# Patient Record
Sex: FEMALE | Race: WHITE | Employment: OTHER | URBAN - METROPOLITAN AREA
[De-identification: names, ages, dates, MRNs, and addresses within clinical notes are randomized per-mention and may not be internally consistent; named-entity substitution may affect disease eponyms.]

---

## 2017-10-17 ENCOUNTER — CARE COORDINATION (OUTPATIENT)
Dept: CARE COORDINATION | Facility: CLINIC | Age: 67
End: 2017-10-17

## 2017-10-17 DIAGNOSIS — G35 MS (MULTIPLE SCLEROSIS) (H): Primary | ICD-10-CM

## 2017-10-17 NOTE — PROGRESS NOTES
"linic Care Coordination Contact  OUTREACH    Referral Information:  Referral Source: PCP  Reason for Contact: Initial. Transfer concerns.  Care Conference: No     Universal Utilization:   ED Visits in last year: 1  Hospital visits in last year: 1  Last PCP appointment: 10/17/17  Multiple Providers or Specialists: Cardiology    Clinical Concerns:  Current Medical Concerns:   Patient Active Problem List   Diagnosis     Multiple sclerosis (H)     Anterior shoulder dislocation, right, initial encounter       Current Behavioral Concerns: None    Education Provided to patient: To call clinic with all concerns.   Informed pt to contact CC if [facility] does not contact them in the next 2 days.  Pt states understanding.   Clinical Pathway: None    Medication Management:  Pt states her and  set up medications together weekly in mediplanner.  Pt has difficulty with finger dexterity d/t contractures she states.  Med reconciliation completed today.  Pt states she has a narcotic medication she uses \"rarely\" but does not \"remember what it is called\"  Has no issues obtaining medications as usually gets them through the mail.    Functional Status:  Mobility Status: Dependent/Assisted by Another  Equipment Currently Used at Home: grab bar, walker, standard  Transportation: Uses [employer] mobility.     Psychosocial:  Current living arrangement:: I live in a private home with spouse  Financial/Insurance: [employer].  Denies financial concerns.    Resources and Interventions:  Current Resources:  [employer]  Advanced Care Plans/Directives on file:: In process (per PCP note information has been sent home.)  Referrals Placed: Home Care  Patient/Caregiver understanding:  Pt states she is having trouble getting to the toilet independently d/t mobility issues caused by her MS.  She currently has a commode but states  \"it is rickety\" and she is unable to use it.  She states her  assists her to the bathroom but gets " "\"irritated when he needs to help\"  Pt would like to gain some independence again and possibly \"learn some tricks so she can transfer herself to the toilet independantly\"  She does have grab bars in the bathroom with a pivot disc\"  She has pain to bilateral lower extremities and is taking scheduled Tramadol and just began taking Lyrica for this in recent days.  She states this is \"pretty effective for pain\" States she \"can feel the difference since beginning Lyrica already\"  Pt states she will begin on ABX for her UTI today. She complains of some burning with urination and believes UTI adds to pain of bilateral LE'S.  Pharmacy to deliver the medication this evening. She states she has occasional constipation.  Her appetite is fair.  No issues with sleep.       Upcoming appointment:  TBD     Plan: CC put in referral for [facility].  No further outreaches as pt is not part of shared savings.  Pt to contact CC if [facility] does not make contact with her in next 2 days.  Pt states understanding.     Valarie Reynolds RN  [facility]  Care Coordination  [phone]    "

## 2017-10-18 ENCOUNTER — DOCUMENTATION ONLY (OUTPATIENT)
Dept: CARE COORDINATION | Facility: CLINIC | Age: 67
End: 2017-10-18

## 2017-10-18 NOTE — PROGRESS NOTES
[facility] will be sharing updates with you on Referral requests for home care services.  This is for care coordination purposes and alert you to referral status.    Dear Dr. Vanessa Whitney, Attending),  Thank You for the referral for [facility], for Mariela Allen; MRN [id number].  At this time, [facility] is experiencing a high volume of referrals and is not able to meet your patient s needs in a safe and timely fashion for **Home Care Services**.  We have transferred this client to our partnering agency [facility] [phone],  Fax [phone].  Partner Agency will be calling the client to set up an assessment visit within our 48hr window.  They will follow up with the PCP later this week to establish POC orders.  Client is agreeable to this plan.  We have forwarded the MD orders, H&P, DC Summary, procedure reports, EPIC Face Sheet and financial information to our partner agency.  We have confirmed receipt of the fax and their acceptance of the referral.  Thank you again for the referral,  Lindsay Ellsworth,   [phone]

## 2017-12-21 ENCOUNTER — TRANSFERRED RECORDS (OUTPATIENT)
Dept: HEALTH INFORMATION MANAGEMENT | Facility: CLINIC | Age: 67
End: 2017-12-21

## 2018-07-02 ENCOUNTER — HOSPITAL ENCOUNTER (OUTPATIENT)
Dept: MAMMOGRAPHY | Facility: CLINIC | Age: 68
Discharge: HOME | End: 2018-07-02
Attending: FAMILY MEDICINE | Admitting: FAMILY MEDICINE
Payer: MEDICARE

## 2018-07-02 ENCOUNTER — HOSPITAL ENCOUNTER (OUTPATIENT)
Dept: MAMMOGRAPHY | Facility: CLINIC | Age: 68
End: 2018-07-02
Attending: FAMILY MEDICINE
Payer: MEDICARE

## 2018-07-02 DIAGNOSIS — R92.8 OTHER ABNORMAL AND INCONCLUSIVE FINDINGS ON DIAGNOSTIC IMAGING OF BREAST: ICD-10-CM

## 2018-07-02 DIAGNOSIS — N63.0 BREAST NODULE: ICD-10-CM

## 2018-07-02 PROCEDURE — 76642 ULTRASOUND BREAST LIMITED: CPT | Mod: 50

## 2018-07-02 PROCEDURE — 77066 DX MAMMO INCL CAD BI: CPT

## 2018-07-09 ENCOUNTER — HOSPITAL ENCOUNTER (OUTPATIENT)
Dept: MAMMOGRAPHY | Facility: CLINIC | Age: 68
Discharge: HOME | End: 2018-07-09
Attending: FAMILY MEDICINE | Admitting: FAMILY MEDICINE
Payer: MEDICARE

## 2018-07-09 ENCOUNTER — HOSPITAL ENCOUNTER (OUTPATIENT)
Dept: MAMMOGRAPHY | Facility: CLINIC | Age: 68
End: 2018-07-09
Attending: FAMILY MEDICINE
Payer: MEDICARE

## 2018-07-09 DIAGNOSIS — N63.0 BREAST NODULE: ICD-10-CM

## 2018-07-09 DIAGNOSIS — R92.8 OTHER ABNORMAL AND INCONCLUSIVE FINDINGS ON DIAGNOSTIC IMAGING OF BREAST: ICD-10-CM

## 2018-07-09 PROCEDURE — 88305 TISSUE EXAM BY PATHOLOGIST: CPT | Performed by: RADIOLOGY

## 2018-07-09 PROCEDURE — 00000158 ZZHCL STATISTIC H-FISH PROCESS B/S: Performed by: RADIOLOGY

## 2018-07-09 PROCEDURE — 88377 M/PHMTRC ALYS ISHQUANT/SEMIQ: CPT | Performed by: PATHOLOGY

## 2018-07-09 PROCEDURE — 88360 TUMOR IMMUNOHISTOCHEM/MANUAL: CPT | Performed by: RADIOLOGY

## 2018-07-09 PROCEDURE — 00000159 ZZHCL STATISTIC H-SEND OUTS PREP: Performed by: RADIOLOGY

## 2018-07-09 PROCEDURE — 19083 BX BREAST 1ST LESION US IMAG: CPT | Mod: RT

## 2018-07-09 PROCEDURE — 25000125 ZZHC RX 250: Performed by: FAMILY MEDICINE

## 2018-07-09 PROCEDURE — 88360 TUMOR IMMUNOHISTOCHEM/MANUAL: CPT | Mod: 26,59 | Performed by: RADIOLOGY

## 2018-07-09 PROCEDURE — 27210206 US BREAST BIOPSY CORE NEEDLE LEFT

## 2018-07-09 PROCEDURE — 88360 TUMOR IMMUNOHISTOCHEM/MANUAL: CPT | Mod: 26 | Performed by: RADIOLOGY

## 2018-07-09 PROCEDURE — 88305 TISSUE EXAM BY PATHOLOGIST: CPT | Mod: 26 | Performed by: RADIOLOGY

## 2018-07-09 RX ADMIN — LIDOCAINE HYDROCHLORIDE 10 ML: 10 INJECTION, SOLUTION INFILTRATION; PERINEURAL at 13:43

## 2018-07-09 NOTE — DISCHARGE INSTRUCTIONS

## 2018-07-11 ENCOUNTER — TELEPHONE (OUTPATIENT)
Dept: SURGERY | Facility: CLINIC | Age: 68
End: 2018-07-11

## 2018-07-11 LAB — COPATH REPORT: NORMAL

## 2018-07-11 NOTE — TELEPHONE ENCOUNTER
Call received from LATOYA Weldon. Referring provider would like breast center to call patient with pathology results from breast biopsy and coordinate care.     Ashley Infante RN, BSN, OCN  Nurse Navigator   [facility]  [phone]

## 2018-07-11 NOTE — TELEPHONE ENCOUNTER
Left message for patient to call back. No phi left on voicemail.    Ashley Infante RN, BSN, OCN  Nurse Navigator   [facility]  [phone]

## 2018-07-11 NOTE — TELEPHONE ENCOUNTER
Call placed to referring provider's office, [facility]. Pathology results of breast biopsy performed on 7/9/2018 given to LATOYA Weldon. She will review results with provider and call us back. Both parties in agreement of plan.    Ashley Infante RN, BSN, OCN  Nurse Navigator   [facility]  [phone]

## 2018-07-11 NOTE — TELEPHONE ENCOUNTER
Call returned by patient.  verified.    Patient notified pathology results from breast biopsies performed on 2018 revealed:    Left Breast, 0.8 cm lesion, 1:00 position, 5 cm from the nipple: invasive ductal carcinoma, grade 1.  Right Breast, 0.8cm lesion, 12:00 position 2 cm from the nipple: benign fibrotic breast tissue.    Per radiologist, Dr. Chris Cruz, results are concordant with imaging findings.    Recommendation: surgical consultation.     Patient is scheduled to see Dr. Mariel Boucher on 2018 at 1:45 check in at 1:30 at [facility].      All patient's questions answered appropriately and thoroughly. Patient will call our office in the interim with additional questions/concerns.     Both parties in agreement of above plan.    Ashley Infante RN, BSN, OCN  Nurse Navigator   [facility]  [phone]

## 2018-07-12 LAB — COPATH REPORT: NORMAL

## 2018-07-17 ENCOUNTER — TELEPHONE (OUTPATIENT)
Dept: SURGERY | Facility: CLINIC | Age: 68
End: 2018-07-17

## 2018-07-17 ENCOUNTER — OFFICE VISIT (OUTPATIENT)
Dept: SURGERY | Facility: CLINIC | Age: 68
End: 2018-07-17
Payer: COMMERCIAL

## 2018-07-17 VITALS
HEART RATE: 89 BPM | HEIGHT: 62 IN | BODY MASS INDEX: 20.61 KG/M2 | WEIGHT: 112 LBS | SYSTOLIC BLOOD PRESSURE: 137 MMHG | DIASTOLIC BLOOD PRESSURE: 85 MMHG

## 2018-07-17 DIAGNOSIS — C50.412 MALIGNANT NEOPLASM OF UPPER-OUTER QUADRANT OF LEFT BREAST IN FEMALE, ESTROGEN RECEPTOR POSITIVE (H): Primary | ICD-10-CM

## 2018-07-17 DIAGNOSIS — Z17.0 MALIGNANT NEOPLASM OF UPPER-OUTER QUADRANT OF LEFT BREAST IN FEMALE, ESTROGEN RECEPTOR POSITIVE (H): Primary | ICD-10-CM

## 2018-07-17 PROCEDURE — 99205 OFFICE O/P NEW HI 60 MIN: CPT | Performed by: SURGERY

## 2018-07-17 NOTE — NURSING NOTE
Breast Patients    BREAST PATIENTS (ALL)    1-Do you have any of the following symptoms? Other: non3  2-In which breast are you having the symptoms? left  3-Do you use hormones?  No  4-Have you had a Mammogram? Yes  Where:  breast center  Date: 2018  5-Have you ever had a breast cyst drained? No  6-Have you ever had a breast biopsy? Yes  Side: both  Date: 7/9/18  7-Have you ever had a Breast Cancer? No   8-Is there a history of Breast Cancer in your family? No  9-Have you ever had Ovarian Cancer? No  10-Is there a history of Ovarian Cancer in your family? Yes   Relationship to you:    Mother  11-Summarize your caffeine intake (i.e. coffee, tea, chocolate, soda etc.): 0    BREAST PATIENTS (FEMALE)    12-What age did your periods begin? 13  13-Date your last menstrual period began? unsure  14-Number of full-term pregnancies: 0  15-Your age when your first child was born? N/A  16-Did you nurse your children? N/A  17-Are you pregnant now? No  18-Have you begun menopause? Yes  Age Menopause began:  1998  19-Have you had either ovary removed?No  20-Do you have breast implants? No

## 2018-07-17 NOTE — PROGRESS NOTES
[facility] Breast Surgery Consultation    HPI:   Mariela Allen is a 68 year old female who is seen in consultation at the request of Dr. Jackson for evaluation of newly diagnosed left breast cancer. She underwent a diagnostic mammogram and US on 7/2/2018 as follow up after a CT from 3/13 revealed a nodule in the right breast. The right breast nodule was biopsied and benign. Her mammogram also revealed a irregular mass in the left upper outer breast which measured 8mm in size.     Mariela has a PMH s/f multiple sclerosis and mitral stenosis. She reports no prior breast concerns. No prior biopsies or surgeries. She had not noticed any breast changes. Her MS has been progressive. She is currently in a wheelchair. She does not drive. She lives with her  who also has some medical problems.     Hormonal history:  Post menopausal,  no HRT, no fertility treatment.  no children, menarche 13.    Family history of breast cancer: No  Family history of ovarian cancer:  Yes - mother, ? In her 50s.   Family history of colon cancer: No  Family history of prostate cancer: No    Imaging:     Recent Results (from the past 744 hour(s))   MA Diagnostic Bilateral w/Elpidio    Narrative    Examination: Bilateral digital diagnostic mammography and digital  breast tomosynthesis with computer aided detection, and focused  ultrasound of both breasts, 7/2/2018.    Comparison: Outside CT dated 3/13/2018. No prior comparison mammograms  available.    History: Outside CT demonstrating nodule in the right breast. Patient  with multiple sclerosis.    BREAST DENSITY: Scattered fibroglandular densities    Findings: Mammogram with digital breast tomosynthesis demonstrates a  subcentimeter oval circumscribed mass in the superior retroareolar  right breast.    Mammogram with digital breast tomosynthesis also demonstrates an  irregularly-shaped mass with architectural distortion in the upper  outer quadrant of the left breast.    Focussed  ultrasound of the upper inner/upper outer quadrants of the  right breast was performed. Hypoechoic oval mass with somewhat  indistinct margins is seen at the 12:00 position, 2 cm from the nipple  on the right. This measures 5 by by 8 mm.    Focussed ultrasound of the upper outer quadrant of the left breast was  performed. Irregularly-shaped hypoechoic mass with indistinct margins  is seen at the 1:00 position, 5 cm from the nipple on the left. This  measures 8 x 7 x 8 mm.      Impression    IMPRESSION: BI-RADS CATEGORY: 4 - Suspicious Abnormality-Biopsy Should  Be Considered.    RECOMMENDED FOLLOW-UP: Biopsy.    Ultrasound-guided core needle biopsy of both the right and left  breasts.    The patient was given the results of the examination.    LEIGH YEPEZ MD   US Breast Bilateral Limited 1-3 Quadrants    Narrative    Examination: Bilateral digital diagnostic mammography and digital  breast tomosynthesis with computer aided detection, and focused  ultrasound of both breasts, 7/2/2018.    Comparison: Outside CT dated 3/13/2018. No prior comparison mammograms  available.    History: Outside CT demonstrating nodule in the right breast. Patient  with multiple sclerosis.    BREAST DENSITY: Scattered fibroglandular densities    Findings: Mammogram with digital breast tomosynthesis demonstrates a  subcentimeter oval circumscribed mass in the superior retroareolar  right breast.    Mammogram with digital breast tomosynthesis also demonstrates an  irregularly-shaped mass with architectural distortion in the upper  outer quadrant of the left breast.    Focussed ultrasound of the upper inner/upper outer quadrants of the  right breast was performed. Hypoechoic oval mass with somewhat  indistinct margins is seen at the 12:00 position, 2 cm from the nipple  on the right. This measures 5 by by 8 mm.    Focussed ultrasound of the upper outer quadrant of the left breast was  performed. Irregularly-shaped hypoechoic mass with  indistinct margins  is seen at the 1:00 position, 5 cm from the nipple on the left. This  measures 8 x 7 x 8 mm.      Impression    IMPRESSION: BI-RADS CATEGORY: 4 - Suspicious Abnormality-Biopsy Should  Be Considered.    RECOMMENDED FOLLOW-UP: Biopsy.    Ultrasound-guided core needle biopsy of both the right and left  breasts.    The patient was given the results of the examination.    LEIGH YEPEZ MD   US Breast Biopsy Core Needle Right    Narrative    ULTRASOUND BREAST BIOPSY CORE NEEDLE RIGHT, ULTRASOUND BREAST BIOPSY  CORE NEEDLE LEFT July 9, 2018 1:50 PM    HISTORY: Bilateral breast masses.    FINDINGS: Procedure, risks, benefits and alternatives were discussed  with the patient and the patient gave written and verbal consent.  Aseptic technique was utilized. 1% lidocaine was utilized for local  anesthesia. Under ultrasound guidance, biopsy of the masses was  performed and marker placed as follows and images were archived:  Site A:  Size: 14 gauge core needle biopsy system  Number of cores: Three.  Position: 1 o'clock, 5 cm from the nipple.  Marker: HydroMark     Site B:  Size: 14 gauge core needle biopsy system.  Number of cores: Three.  Position: 12 o'clock, 2 cm from the nipple.  Marker: HydroMark     Pressure was held over this area for approximately 10 minutes. A  dressing was placed and care instructions were discussed with the  patient.    Post biopsy mammogram was deferred.      Impression    IMPRESSION: Uncomplicated ultrasound guided core needle biopsy of both  breasts.    TI WELLS MD   US Breast Biopsy Core Needle Left    Narrative    ULTRASOUND BREAST BIOPSY CORE NEEDLE RIGHT, ULTRASOUND BREAST BIOPSY  CORE NEEDLE LEFT July 9, 2018 1:50 PM    HISTORY: Bilateral breast masses.    FINDINGS: Procedure, risks, benefits and alternatives were discussed  with the patient and the patient gave written and verbal consent.  Aseptic technique was utilized. 1% lidocaine was utilized for  local  anesthesia. Under ultrasound guidance, biopsy of the masses was  performed and marker placed as follows and images were archived:  Site A:  Size: 14 gauge core needle biopsy system  Number of cores: Three.  Position: 1 o'clock, 5 cm from the nipple.  Marker: HydroMark     Site B:  Size: 14 gauge core needle biopsy system.  Number of cores: Three.  Position: 12 o'clock, 2 cm from the nipple.  Marker: HydroMark     Pressure was held over this area for approximately 10 minutes. A  dressing was placed and care instructions were discussed with the  patient.    Post biopsy mammogram was deferred.      Impression    IMPRESSION: Uncomplicated ultrasound guided core needle biopsy of both  breasts.    TI WELLS MD       Percutaneous core needle biopsy, left:     ORIGINAL REPORT:     SPECIMEN(S):   A: Left ultrasound guided breast needle biopsy, 1:00m, 5.0 cm from nipple,    0.8 cm size   B: Right ultrasound guided breast needle biopsy, 12:0, 2.0 cm from nipple,    0.8 cm size     FINAL DIAGNOSIS:   A. Left breast, 0.8 cm lesion, 1:00 position, 5 cm from the nipple,   ultrasound core biopsy   - Invasive carcinoma of no special type (ductal, not otherwise specified)   - [place] grade 1 of 3   - No lymphovascular space invasion seen   - No in situ carcinoma seen   - No microcalcifications seen   - See separate reports for the estrogen and progesterone receptor studies   and the HER2 study by FISH     ER/AR positive, Her 2 negative      B. Right breast, 0.8 cm lesion, 12:00 position, 2 cm from the nipple,   ultrasound core biopsy   - Benign fibrotic breast tissue (see comment)   - No evidence of malignancy           Past Medical History:   has a past medical history of Cancer (H) and Multiple sclerosis (H).      Current Outpatient Prescriptions:      baclofen (LIORESAL) 10 MG tablet, Take 3 tablets by mouth 4 times daily, Disp: , Rfl:      Calcium Citrate-Vitamin D (CITRACAL + D PO), , Disp: , Rfl:       Cholecalciferol (VITAMIN D PO), , Disp: , Rfl:      Cyanocobalamin (VITAMIN B12 PO), , Disp: , Rfl:      diazepam (VALIUM) 5 MG tablet, Take 5 mg by mouth 3 times daily as needed, Disp: , Rfl:      HYDROcodone-acetaminophen (VICODIN) 5-500 MG per tablet, Take 1 tablet by mouth every 6 hours as needed For pain. Max acetaminophen dose: 4000 mg in 24hrs., Disp: , Rfl:      imipramine (TOFRANIL) 10 MG tablet, Take 20 mg by mouth At Bedtime, Disp: , Rfl:      pramipexole (MIRAPEX) 0.25 MG tablet, Take 0.25 mg by mouth At Bedtime Take 2-3 hours before bedtime, Disp: , Rfl:      traMADol (ULTRAM) 50 MG tablet, Take 50 mg by mouth 2 times daily, Disp: , Rfl:     Past Surgical History:  Past Surgical History:   Procedure Laterality Date     ORTHOPEDIC SURGERY             Allergies   Allergen Reactions     Solu-Medrol [Methylprednisolone]      High Dose caused Delirium        Social History:  Social History     Social History     Marital status:      Spouse name: N/A     Number of children: N/A     Years of education: N/A     Occupational History     Not on file.     Social History Main Topics     Smoking status: Not on file     Smokeless tobacco: Not on file     Alcohol use Yes      Comment: drinks wine almost daily      Drug use: Not on file     Sexual activity: Not on file     Other Topics Concern     Not on file     Social History Narrative        ROS:  The 10 point review of systems is negative other than noted in the HPI and above.    PE:  Vitals: There were no vitals taken for this visit.  General appearance: well-nourished, sitting comfortably, no apparent distress  Psych: normal affect, pleasant  HEENT:  Head normocephalic and atraumatic, pupils equal and round, conjunctivae clear, mucous membranes moist, external ears and nose normal  Neck: Supple without thyromegaly or masses  Lungs: Respirations unlabored  Lymphatic: No cervical, or supraclavicular lymphadenopathy  Extremities: Without  edema  Musculoskeletal:  Normal station and gait  Neurologic: nonfocal, grossly intact times four extremities, alert and oriented times three  Psychiatric: Mood and affect are appropriate  Skin: Without lesions or rashes    Breast:  A bilateral breast exam was performed in the supine position.. Bilateral breasts were palpated in a circumferential clockwise fashion including the supraclavicular and axillary areas.   Ecchymosis present on bilateral breast at biopsy sites, palpable hematoma on left deep to biopsy site, no definite palpable mass. Nipple/areola are normal bilaterally. Skin otherwise normal. Parenchyma is moderately dense.     Lymph:       No supraclavicular/infraclavicular adenopathy.   Axillary adenopathy: none    Assessment:  Left breast invasive ductal carcinoma, grade 1, ER +, HI +, [id number] - measuring 0.8 cm at 1:00 and 4 cm from the nipple.    Plan:  Mariela is a very pleasant 68yof with history of MS who  has newly diagnosed left breast cancer.  I reviewed the imaging and pathology reports with her and explained the findings.  We talked about the fact that this is invasive ductal carcinoma  that is very small size.  We also talked about the fact that the tumor has favorable features (strongly ER/HI positive and no lymphvascular invasion) and should be quite treatable.    We next discussed the surgical options for treatment.  I described the procedures for lumpectomy and mastectomy including the details of the procedures, the risks, anesthesia and expected recovery.      I reviewed the data regarding lumpectomy and radiation vs mastectomy that shows that the local recurrence risk is slightly higher for lumpectomy and radiation vs mastectomy (5-10% vs. 1-2%), but the survival at 20 years is the same.  I advised  that lymph node biopsy is recommended whenever we are dealing with invasive breast cancer and described the procedure for sentinel lymph node biopsy.  We talked about the risk for  lymphedema which is small with removal of only a few nodes, but certainly not zero.  I also explained that since this is a very small tumor and was not palpable on clinical breast exam prior to the biopsy, I would ask radiology to place a radioactive seed pre-operatively for localization.    We talked about post-lumpectomy radiation, the course and usual side effects. We also talked about post-mastectomy reconstruction and the stages involved.    We have had a detailed discussion regarding the recommended treatment for axillary lymph node metastases for women undergoing lumpectomy followed by radiation and for women undergoing mastectomy.    We have discussed the indication, alternatives, risks and expected recovery.  Specifically, we have discussed local recurrence of cancer, risk of future second primary breast cancer, incisions, scarring, breast deformity, volume loss, possible need for axillary lymphadenectomy, postoperative infection, anesthesia, bleeding, blood transfusion, DVT, PE, postoperative restrictions and physical limitations.  We have discussed the recommended interventions and treatments for these outcomes.      All questions have been answered to the best of my ability.    At this time, she elects to proceed with seed localized left breast lumpectomy with SLNB and adjuvant radiation. We discussed that we would likely send an oncotype following surgery and that she will meet with Oncology a couple weeks after surgery as well.       Mariel Boucher MD      Please route or send letter to:  Primary Care Provider (PCP) and Referring Provider

## 2018-07-17 NOTE — LETTER
2018    Re: Mariela Allen - 1950    Mariela Allen is a 68 year old female who is seen in consultation at the request of Dr. Jackson for evaluation of newly diagnosed left breast cancer. She underwent a diagnostic mammogram and US on 2018 as follow up after a CT from 3/13 revealed a nodule in the right breast. The right breast nodule was biopsied and benign. Her mammogram also revealed a irregular mass in the left upper outer breast which measured 8mm in size.      Mariela has a PMH s/f multiple sclerosis and mitral stenosis. She reports no prior breast concerns. No prior biopsies or surgeries. She had not noticed any breast changes. Her MS has been progressive. She is currently in a wheelchair. She does not drive. She lives with her  who also has some medical problems.      Hormonal history:  Post menopausal,  no HRT, no fertility treatment.  no children, menarche 13.     Family history of breast cancer: No  Family history of ovarian cancer:  Yes - mother, ? In her 50s.   Family history of colon cancer: No  Family history of prostate cancer: No     Imaging:          Recent Results (from the past 744 hour(s))   MA Diagnostic Bilateral w/Elpidio     Narrative     Examination: Bilateral digital diagnostic mammography and digital  breast tomosynthesis with computer aided detection, and focused  ultrasound of both breasts, 2018.     Comparison: Outside CT dated 3/13/2018. No prior comparison mammograms  available.     History: Outside CT demonstrating nodule in the right breast. Patient  with multiple sclerosis.     BREAST DENSITY: Scattered fibroglandular densities     Findings: Mammogram with digital breast tomosynthesis demonstrates a  subcentimeter oval circumscribed mass in the superior retroareolar  right breast.     Mammogram with digital breast tomosynthesis also demonstrates an  irregularly-shaped mass with architectural distortion in the upper  outer quadrant of the left  breast.     Focussed ultrasound of the upper inner/upper outer quadrants of the  right breast was performed. Hypoechoic oval mass with somewhat  indistinct margins is seen at the 12:00 position, 2 cm from the nipple  on the right. This measures 5 by by 8 mm.     Focussed ultrasound of the upper outer quadrant of the left breast was  performed. Irregularly-shaped hypoechoic mass with indistinct margins  is seen at the 1:00 position, 5 cm from the nipple on the left. This  measures 8 x 7 x 8 mm.     Impression     IMPRESSION: BI-RADS CATEGORY: 4 - Suspicious Abnormality-Biopsy Should  Be Considered.     RECOMMENDED FOLLOW-UP: Biopsy.     Ultrasound-guided core needle biopsy of both the right and left  breasts.     The patient was given the results of the examination.     LEIGH YEPEZ MD   US Breast Bilateral Limited 1-3 Quadrants     Narrative     Examination: Bilateral digital diagnostic mammography and digital  breast tomosynthesis with computer aided detection, and focused  ultrasound of both breasts, 7/2/2018.     Comparison: Outside CT dated 3/13/2018. No prior comparison mammograms  available.     History: Outside CT demonstrating nodule in the right breast. Patient  with multiple sclerosis.     BREAST DENSITY: Scattered fibroglandular densities     Findings: Mammogram with digital breast tomosynthesis demonstrates a  subcentimeter oval circumscribed mass in the superior retroareolar  right breast.     Mammogram with digital breast tomosynthesis also demonstrates an  irregularly-shaped mass with architectural distortion in the upper  outer quadrant of the left breast.     Focussed ultrasound of the upper inner/upper outer quadrants of the  right breast was performed. Hypoechoic oval mass with somewhat  indistinct margins is seen at the 12:00 position, 2 cm from the nipple  on the right. This measures 5 by by 8 mm.     Focussed ultrasound of the upper outer quadrant of the left breast was  performed.  Irregularly-shaped hypoechoic mass with indistinct margins  is seen at the 1:00 position, 5 cm from the nipple on the left. This  measures 8 x 7 x 8 mm.     Impression     IMPRESSION: BI-RADS CATEGORY: 4 - Suspicious Abnormality-Biopsy Should  Be Considered.     RECOMMENDED FOLLOW-UP: Biopsy.     Ultrasound-guided core needle biopsy of both the right and left  breasts.     The patient was given the results of the examination.     LEIGH YEPEZ MD   US Breast Biopsy Core Needle Right     Narrative     ULTRASOUND BREAST BIOPSY CORE NEEDLE RIGHT, ULTRASOUND BREAST BIOPSY  CORE NEEDLE LEFT July 9, 2018 1:50 PM     HISTORY: Bilateral breast masses.     FINDINGS: Procedure, risks, benefits and alternatives were discussed  with the patient and the patient gave written and verbal consent.  Aseptic technique was utilized. 1% lidocaine was utilized for local  anesthesia. Under ultrasound guidance, biopsy of the masses was  performed and marker placed as follows and images were archived:  Site A:  Size: 14 gauge core needle biopsy system  Number of cores: Three.  Position: 1 o'clock, 5 cm from the nipple.  Marker: HydroMark      Site B:  Size: 14 gauge core needle biopsy system.  Number of cores: Three.  Position: 12 o'clock, 2 cm from the nipple.  Marker: HydroMark      Pressure was held over this area for approximately 10 minutes. A  dressing was placed and care instructions were discussed with the  patient.     Post biopsy mammogram was deferred.     Impression     IMPRESSION: Uncomplicated ultrasound guided core needle biopsy of both  breasts.     TI WELLS MD   US Breast Biopsy Core Needle Left     Narrative     ULTRASOUND BREAST BIOPSY CORE NEEDLE RIGHT, ULTRASOUND BREAST BIOPSY  CORE NEEDLE LEFT July 9, 2018 1:50 PM     HISTORY: Bilateral breast masses.     FINDINGS: Procedure, risks, benefits and alternatives were discussed  with the patient and the patient gave written and verbal consent.  Aseptic technique was  utilized. 1% lidocaine was utilized for local  anesthesia. Under ultrasound guidance, biopsy of the masses was  performed and marker placed as follows and images were archived:  Site A:  Size: 14 gauge core needle biopsy system  Number of cores: Three.  Position: 1 o'clock, 5 cm from the nipple.  Marker: HydroMark      Site B:  Size: 14 gauge core needle biopsy system.  Number of cores: Three.  Position: 12 o'clock, 2 cm from the nipple.  Marker: HydroMark      Pressure was held over this area for approximately 10 minutes. A  dressing was placed and care instructions were discussed with the  patient.     Post biopsy mammogram was deferred.     Impression     IMPRESSION: Uncomplicated ultrasound guided core needle biopsy of both  breasts.     TI WELLS MD         Percutaneous core needle biopsy, left:      ORIGINAL REPORT:     SPECIMEN(S):   A: Left ultrasound guided breast needle biopsy, 1:00m, 5.0 cm from nipple,    0.8 cm size   B: Right ultrasound guided breast needle biopsy, 12:0, 2.0 cm from nipple,    0.8 cm size     FINAL DIAGNOSIS:   A. Left breast, 0.8 cm lesion, 1:00 position, 5 cm from the nipple,   ultrasound core biopsy   - Invasive carcinoma of no special type (ductal, not otherwise specified)   - Randy grade 1 of 3   - No lymphovascular space invasion seen   - No in situ carcinoma seen   - No microcalcifications seen   - See separate reports for the estrogen and progesterone receptor studies   and the HER2 study by FISH    ER/ND positive, Her 2 negative       B. Right breast, 0.8 cm lesion, 12:00 position, 2 cm from the nipple,   ultrasound core biopsy   - Benign fibrotic breast tissue (see comment)   - No evidence of malignancy               Past Medical History:  has a past medical history of Cancer (H) and Multiple sclerosis (H).      Current Outpatient Prescriptions:      baclofen (LIORESAL) 10 MG tablet, Take 3 tablets by mouth 4 times daily, Disp: , Rfl:      Calcium Citrate-Vitamin D  (CITRACAL + D PO), , Disp: , Rfl:      Cholecalciferol (VITAMIN D PO), , Disp: , Rfl:      Cyanocobalamin (VITAMIN B12 PO), , Disp: , Rfl:      diazepam (VALIUM) 5 MG tablet, Take 5 mg by mouth 3 times daily as needed, Disp: , Rfl:      HYDROcodone-acetaminophen (VICODIN) 5-500 MG per tablet, Take 1 tablet by mouth every 6 hours as needed For pain. Max acetaminophen dose: 4000 mg in 24hrs., Disp: , Rfl:      imipramine (TOFRANIL) 10 MG tablet, Take 20 mg by mouth At Bedtime, Disp: , Rfl:      pramipexole (MIRAPEX) 0.25 MG tablet, Take 0.25 mg by mouth At Bedtime Take 2-3 hours before bedtime, Disp: , Rfl:      traMADol (ULTRAM) 50 MG tablet, Take 50 mg by mouth 2 times daily, Disp: , Rfl:             ROS:  The 10 point review of systems is negative other than noted in the HPI and above.     PE:  Vitals: There were no vitals taken for this visit.  General appearance: well-nourished, sitting comfortably, no apparent distress  Psych: normal affect, pleasant  HEENT:  Head normocephalic and atraumatic, pupils equal and round, conjunctivae clear, mucous membranes moist, external ears and nose normal  Neck: Supple without thyromegaly or masses  Lungs: Respirations unlabored  Lymphatic: No cervical, or supraclavicular lymphadenopathy  Extremities: Without edema  Musculoskeletal:  Normal station and gait  Neurologic: nonfocal, grossly intact times four extremities, alert and oriented times three  Psychiatric: Mood and affect are appropriate  Skin: Without lesions or rashes     Breast:  A bilateral breast exam was performed in the supine position.. Bilateral breasts were palpated in a circumferential clockwise fashion including the supraclavicular and axillary areas.   Ecchymosis present on bilateral breast at biopsy sites, palpable hematoma on left deep to biopsy site, no definite palpable mass. Nipple/areola are normal bilaterally. Skin otherwise normal. Parenchyma is moderately dense.      Lymph:         No  supraclavicular/infraclavicular adenopathy.                        Axillary adenopathy: none     Assessment:  Left breast invasive ductal carcinoma, grade 1, ER +, VA +, [id number] - measuring 0.8 cm at 1:00 and 4 cm from the nipple.     Plan:  Mariela is a very pleasant 68yof with history of MS who  has newly diagnosed left breast cancer.  I reviewed the imaging and pathology reports with her and explained the findings.  We talked about the fact that this is invasive ductal carcinoma  that is very small size.  We also talked about the fact that the tumor has favorable features (strongly ER/VA positive and no lymphvascular invasion) and should be quite treatable.     We next discussed the surgical options for treatment.  I described the procedures for lumpectomy and mastectomy including the details of the procedures, the risks, anesthesia and expected recovery.       I reviewed the data regarding lumpectomy and radiation vs mastectomy that shows that the local recurrence risk is slightly higher for lumpectomy and radiation vs mastectomy (5-10% vs. 1-2%), but the survival at 20 years is the same.  I advised  that lymph node biopsy is recommended whenever we are dealing with invasive breast cancer and described the procedure for sentinel lymph node biopsy.  We talked about the risk for lymphedema which is small with removal of only a few nodes, but certainly not zero.  I also explained that since this is a very small tumor and was not palpable on clinical breast exam prior to the biopsy, I would ask radiology to place a radioactive seed pre-operatively for localization.     We talked about post-lumpectomy radiation, the course and usual side effects. We also talked about post-mastectomy reconstruction and the stages involved.     We have had a detailed discussion regarding the recommended treatment for axillary lymph node metastases for women undergoing lumpectomy followed by radiation and for women undergoing  mastectomy.     We have discussed the indication, alternatives, risks and expected recovery.  Specifically, we have discussed local recurrence of cancer, risk of future second primary breast cancer, incisions, scarring, breast deformity, volume loss, possible need for axillary lymphadenectomy, postoperative infection, anesthesia, bleeding, blood transfusion, DVT, PE, postoperative restrictions and physical limitations. We have discussed the recommended interventions and treatments for these outcomes.       All questions have been answered to the best of my ability.     At this time, she elects to proceed with seed localized left breast lumpectomy with SLNB and adjuvant radiation. We discussed that we would likely send an oncotype following surgery and that she will meet with Oncology a couple weeks after surgery as well.         Mariel Boucher MD

## 2018-07-17 NOTE — MR AVS SNAPSHOT
After Visit Summary   7/17/2018    Mariela Allen    MRN: [id number]           Patient Information     Date Of Birth          1950        Visit Information        Provider Department      7/17/2018 1:45 PM Mariel Boucher MD [facility]      Today's Diagnoses     Malignant neoplasm of upper-outer quadrant of left breast in female, estrogen receptor positive (H)    -  1       Follow-ups after your visit        Your next 10 appointments already scheduled     Aug 15, 2018 10:15 AM CDT   US BREAST RADIOACTIVE SEED LOCALIZATION INITIAL LEFT with SHBCUS1,  BREAST NURSE, SH BREAST RAD   [facility] [place] ([facility])    [address] [place] [address], [address]  [facility] [id number] [phone]           Please bring a list of your medicines (including vitamins, minerals and over-the-counter drugs). Also, tell your doctor about any allergies you may have. Wear comfortable clothes and leave your valuables at home.  You do not need to do anything special to prepare for your exam.  Please call the Imaging Department at your exam site with any questions.            Aug 15, 2018 11:10 AM CDT   MA POST PROCEDURE LEFT with SHBCMA1   [facility] ([facility])    [address] [place] [address], [address]  [facility] [id number] [phone]           Do not use any powder, lotion or deodorant under your arms or on your breast. If you do, we will ask you to remove it before your exam.  Wear comfortable, two-piece clothing.  If you have any allergies, tell your care team.  Bring any previous mammograms from other facilities or have them mailed to the breast center.            Aug 15, 2018 11:30 AM CDT   NM SENTINEL NODE INJECTION BILATERAL with SHNM2   [facility] ([facility])    [address] [address]    [phone]           Please bring a list of your medicines to the exam. (Include vitamins, minerals and over-the-counter drugs.) You should wear comfortable clothes. Leave your valuables at home. Please bring related prior results and films.  Tell your doctor:   If you are breastfeeding or may be pregnant.   If you have had a barium test within the past few days. Barium may change the results of certain exams.   If you think you may need sedation (medicine to help you relax).  You may eat and drink as normal.  Please call your Imaging Department at your exam site with any questions.            Aug 15, 2018   Procedure with Mariel Boucher MD   [facility] (--)    [address]  [facility] [address] [phone]            Aug 15, 2018 12:10 PM CDT   MA BREAST SPECIMEN LEFT OR with SHMASP2   [facility] ([facility])    [address], [address]  [facility] [address] [phone]           Do not use any powder, lotion or deodorant under your arms or on your breast. If you do, we will ask you to remove it before your exam.  Wear comfortable, two-piece clothing.  If you have any allergies, tell your care team.  Bring any previous mammograms from other facilities or have them mailed to the breast center.            Aug 15, 2018 12:15 PM CDT   [facility] Same Day Surgery with Mariel Boucher MD   [facility]    [facility]   [phone]              Future tests that were ordered for you today     Open Future Orders        Priority Expected Expires Ordered    US Breast Radioactive Seed Placement, 1St Lesion Left Routine 7/17/2018 7/17/2019 7/17/2018    MA Breast Specimen Left OR Routine 7/17/2018 7/17/2019 7/17/2018    MA Post Procedure Left Routine 7/17/2018 7/17/2019 7/17/2018    NM Lymphoscintigraphy  "Injection only Routine 7/17/2018 7/17/2019 7/17/2018            Who to contact     If you have questions or need follow up information about today's clinic visit or your schedule please contact [facility] directly at [phone].  Normal or non-critical lab and imaging results will be communicated to you by MyChart, letter or phone within 4 business days after the clinic has received the results. If you do not hear from us within 7 days, please contact the clinic through MyChart or phone. If you have a critical or abnormal lab result, we will notify you by phone as soon as possible.  Submit refill requests through [employer] or call your pharmacy and they will forward the refill request to us. Please allow 3 business days for your refill to be completed.          Additional Information About Your Visit        Care EveryWhere ID     This is your Care EveryWhere ID. This could be used by other organizations to access your [facility] medical records  [id number]        Your Vitals Were     Pulse Height BMI (Body Mass Index)             89 5' 2\" (1.575 m) 20.49 kg/m2          Blood Pressure from Last 3 Encounters:   07/17/18 137/85   12/14/16 164/77   11/23/16 98/53    Weight from Last 3 Encounters:   07/17/18 112 lb (50.8 kg)   12/14/16 112 lb (50.8 kg)   11/20/16 112 lb 11.2 oz (51.1 kg)               Primary Care Provider Office Phone # Fax #    Apurva Aleah Jackson PA-C [phone] [address] [place] [address]        Equal Access to Services     [facility]: Hadii aad china hadasho Sosudheer, waaxda luqadaha, qaybta kaalmadavid ahumada . So [facility] [phone].    ATENCIÓN: Si habla español, tiene a delatorre disposición servicios gratuitos de asistencia lingüística. Llame al [phone].    We comply with applicable federal civil rights laws and [place] laws. We do not discriminate on the basis of race, color, national " origin, age, disability, sex, sexual orientation, or gender identity.            Thank you!     Thank you for choosing [facility]  for your care. Our goal is always to provide you with excellent care. Hearing back from our patients is one way we can continue to improve our services. Please take a few minutes to complete the written survey that you may receive in the mail after your visit with us. Thank you!             Your Updated Medication List - Protect others around you: Learn how to safely use, store and throw away your medicines at www.disposemymeds.org.          This list is accurate as of 7/17/18 11:59 PM.  Always use your most recent med list.                   Brand Name Dispense Instructions for use Diagnosis    baclofen 10 MG tablet    LIORESAL     Take 3 tablets by mouth 4 times daily        CIPROFLOXACIN PO           CITRACAL + D PO           diazepam 5 MG tablet    VALIUM     Take 5 mg by mouth 3 times daily as needed        HYDROcodone-acetaminophen 5-500 MG per tablet    VICODIN     Take 1 tablet by mouth every 6 hours as needed For pain. Max acetaminophen dose: 4000 mg in 24hrs.        imipramine 10 MG tablet    TOFRANIL     Take 20 mg by mouth At Bedtime        pramipexole 0.25 MG tablet    MIRAPEX     Take 0.25 mg by mouth At Bedtime Take 2-3 hours before bedtime        traMADol 50 MG tablet    ULTRAM     Take 50 mg by mouth 2 times daily        VITAMIN B12 PO           VITAMIN D PO

## 2018-07-17 NOTE — TELEPHONE ENCOUNTER
Type of surgery: Seed localized left breast lumpectomy with sentinel lymph node biopsy  Location of surgery: [facility]  Date and time of surgery: 8/15/18 at 12:25pm  Surgeon: Dr. Mariel Boucher  Pre-Op Appt Date: Patient to schedule  Post-Op Appt Date: Patient to schedule   Packet sent out: Yes  Pre-cert/Authorization completed:  Not Applicable  Date: 7/17/18

## 2018-07-18 ENCOUNTER — TELEPHONE (OUTPATIENT)
Dept: SURGERY | Facility: CLINIC | Age: 68
End: 2018-07-18

## 2018-07-18 NOTE — TELEPHONE ENCOUNTER
F/U call placed to patient after consult with Dr. Boucher. Encouraged patient to call back if she had additional questions. Surgery scheduled 8/15/18.    Ashley Infante RN, BSN, OCN  Nurse Navigator   [facility]  [phone]

## 2018-08-08 ENCOUNTER — TRANSFERRED RECORDS (OUTPATIENT)
Dept: HEALTH INFORMATION MANAGEMENT | Facility: CLINIC | Age: 68
End: 2018-08-08

## 2018-08-15 ENCOUNTER — HOSPITAL ENCOUNTER (OUTPATIENT)
Dept: MAMMOGRAPHY | Facility: CLINIC | Age: 68
End: 2018-08-15
Attending: SURGERY
Payer: MEDICARE

## 2018-08-15 ENCOUNTER — SURGERY (OUTPATIENT)
Age: 68
End: 2018-08-15

## 2018-08-15 ENCOUNTER — ANESTHESIA (OUTPATIENT)
Dept: SURGERY | Facility: CLINIC | Age: 68
End: 2018-08-15
Payer: MEDICARE

## 2018-08-15 ENCOUNTER — ANESTHESIA EVENT (OUTPATIENT)
Dept: SURGERY | Facility: CLINIC | Age: 68
End: 2018-08-15
Payer: MEDICARE

## 2018-08-15 ENCOUNTER — HOSPITAL ENCOUNTER (OUTPATIENT)
Dept: NUCLEAR MEDICINE | Facility: CLINIC | Age: 68
Setting detail: NUCLEAR MEDICINE
Discharge: HOME | End: 2018-08-15
Attending: SURGERY | Admitting: SURGERY
Payer: MEDICARE

## 2018-08-15 ENCOUNTER — APPOINTMENT (OUTPATIENT)
Dept: SURGERY | Facility: PHYSICIAN GROUP | Age: 68
End: 2018-08-15
Payer: COMMERCIAL

## 2018-08-15 ENCOUNTER — HOSPITAL ENCOUNTER (OUTPATIENT)
Facility: CLINIC | Age: 68
Discharge: HOME | End: 2018-08-15
Attending: SURGERY | Admitting: SURGERY
Payer: MEDICARE

## 2018-08-15 ENCOUNTER — TRANSFERRED RECORDS (OUTPATIENT)
Dept: HEALTH INFORMATION MANAGEMENT | Facility: CLINIC | Age: 68
End: 2018-08-15

## 2018-08-15 VITALS
BODY MASS INDEX: 24.38 KG/M2 | OXYGEN SATURATION: 98 % | WEIGHT: 132.5 LBS | TEMPERATURE: 97.7 F | HEART RATE: 82 BPM | HEIGHT: 62 IN | SYSTOLIC BLOOD PRESSURE: 121 MMHG | DIASTOLIC BLOOD PRESSURE: 79 MMHG | RESPIRATION RATE: 16 BRPM

## 2018-08-15 DIAGNOSIS — Z17.0 MALIGNANT NEOPLASM OF UPPER-OUTER QUADRANT OF LEFT BREAST IN FEMALE, ESTROGEN RECEPTOR POSITIVE (H): ICD-10-CM

## 2018-08-15 DIAGNOSIS — C50.412 MALIGNANT NEOPLASM OF UPPER-OUTER QUADRANT OF LEFT BREAST IN FEMALE, ESTROGEN RECEPTOR POSITIVE (H): ICD-10-CM

## 2018-08-15 DIAGNOSIS — Z98.890 S/P LUMPECTOMY, LEFT BREAST: Primary | ICD-10-CM

## 2018-08-15 PROCEDURE — 25000125 ZZHC RX 250: Performed by: NURSE ANESTHETIST, CERTIFIED REGISTERED

## 2018-08-15 PROCEDURE — 37000008 ZZH ANESTHESIA TECHNICAL FEE, 1ST 30 MIN: Performed by: SURGERY

## 2018-08-15 PROCEDURE — 38900 IO MAP OF SENT LYMPH NODE: CPT | Performed by: SURGERY

## 2018-08-15 PROCEDURE — 25000128 H RX IP 250 OP 636: Performed by: NURSE ANESTHETIST, CERTIFIED REGISTERED

## 2018-08-15 PROCEDURE — A9270 NON-COVERED ITEM OR SERVICE: HCPCS | Mod: GY | Performed by: SURGERY

## 2018-08-15 PROCEDURE — 71000027 ZZH RECOVERY PHASE 2 EACH 15 MINS: Performed by: SURGERY

## 2018-08-15 PROCEDURE — 34300033 ZZH RX 343: Performed by: SURGERY

## 2018-08-15 PROCEDURE — 19301 PARTIAL MASTECTOMY: CPT | Performed by: SURGERY

## 2018-08-15 PROCEDURE — 40000986 MA POST PROCEDURE LEFT

## 2018-08-15 PROCEDURE — A9520 TC99 TILMANOCEPT DIAG 0.5MCI: HCPCS | Performed by: SURGERY

## 2018-08-15 PROCEDURE — 27210794 ZZH OR GENERAL SUPPLY STERILE: Performed by: SURGERY

## 2018-08-15 PROCEDURE — 38525 BIOPSY/REMOVAL LYMPH NODES: CPT | Mod: 51 | Performed by: SURGERY

## 2018-08-15 PROCEDURE — 38792 RA TRACER ID OF SENTINL NODE: CPT

## 2018-08-15 PROCEDURE — 37000009 ZZH ANESTHESIA TECHNICAL FEE, EACH ADDTL 15 MIN: Performed by: SURGERY

## 2018-08-15 PROCEDURE — 25000125 ZZHC RX 250: Performed by: SURGERY

## 2018-08-15 PROCEDURE — 27210995 ZZH RX 272: Performed by: SURGERY

## 2018-08-15 PROCEDURE — 88307 TISSUE EXAM BY PATHOLOGIST: CPT | Mod: 26 | Performed by: SURGERY

## 2018-08-15 PROCEDURE — 88305 TISSUE EXAM BY PATHOLOGIST: CPT | Mod: 26 | Performed by: SURGERY

## 2018-08-15 PROCEDURE — 25000128 H RX IP 250 OP 636: Performed by: SURGERY

## 2018-08-15 PROCEDURE — 88305 TISSUE EXAM BY PATHOLOGIST: CPT | Performed by: SURGERY

## 2018-08-15 PROCEDURE — 25000132 ZZH RX MED GY IP 250 OP 250 PS 637: Mod: GY | Performed by: SURGERY

## 2018-08-15 PROCEDURE — 71000012 ZZH RECOVERY PHASE 1 LEVEL 1 FIRST HR: Performed by: SURGERY

## 2018-08-15 PROCEDURE — 71000013 ZZH RECOVERY PHASE 1 LEVEL 1 EA ADDTL HR: Performed by: SURGERY

## 2018-08-15 PROCEDURE — 40000268 MA BREAST SPECIMEN LEFT OR

## 2018-08-15 PROCEDURE — 36000060 ZZH SURGERY LEVEL 3 W FLUORO 1ST 30 MIN: Performed by: SURGERY

## 2018-08-15 PROCEDURE — 40000170 ZZH STATISTIC PRE-PROCEDURE ASSESSMENT II: Performed by: SURGERY

## 2018-08-15 PROCEDURE — 36000058 ZZH SURGERY LEVEL 3 EA 15 ADDTL MIN: Performed by: SURGERY

## 2018-08-15 PROCEDURE — 25000128 H RX IP 250 OP 636: Performed by: ANESTHESIOLOGY

## 2018-08-15 PROCEDURE — 88307 TISSUE EXAM BY PATHOLOGIST: CPT | Performed by: SURGERY

## 2018-08-15 PROCEDURE — 19285 PERQ DEV BREAST 1ST US IMAG: CPT | Mod: LT

## 2018-08-15 PROCEDURE — 25000132 ZZH RX MED GY IP 250 OP 250 PS 637: Mod: GY | Performed by: ANESTHESIOLOGY

## 2018-08-15 PROCEDURE — A9270 NON-COVERED ITEM OR SERVICE: HCPCS | Mod: GY | Performed by: ANESTHESIOLOGY

## 2018-08-15 RX ORDER — HYDROCODONE BITARTRATE AND ACETAMINOPHEN 5; 325 MG/1; MG/1
1 TABLET ORAL
Status: COMPLETED | OUTPATIENT
Start: 2018-08-15 | End: 2018-08-15

## 2018-08-15 RX ORDER — SODIUM CHLORIDE, SODIUM LACTATE, POTASSIUM CHLORIDE, CALCIUM CHLORIDE 600; 310; 30; 20 MG/100ML; MG/100ML; MG/100ML; MG/100ML
INJECTION, SOLUTION INTRAVENOUS CONTINUOUS PRN
Status: DISCONTINUED | OUTPATIENT
Start: 2018-08-15 | End: 2018-08-15

## 2018-08-15 RX ORDER — FENTANYL CITRATE 50 UG/ML
25-50 INJECTION, SOLUTION INTRAMUSCULAR; INTRAVENOUS
Status: DISCONTINUED | OUTPATIENT
Start: 2018-08-15 | End: 2018-08-15 | Stop reason: HOSPADM

## 2018-08-15 RX ORDER — ACETAMINOPHEN 650 MG/1
650 SUPPOSITORY RECTAL EVERY 4 HOURS PRN
Status: DISCONTINUED | OUTPATIENT
Start: 2018-08-15 | End: 2018-08-15 | Stop reason: HOSPADM

## 2018-08-15 RX ORDER — HYDROCODONE BITARTRATE AND ACETAMINOPHEN 5; 325 MG/1; MG/1
1-2 TABLET ORAL EVERY 4 HOURS PRN
Qty: 20 TABLET | Refills: 0 | Status: ON HOLD | OUTPATIENT
Start: 2018-08-15 | End: 2021-03-19

## 2018-08-15 RX ORDER — HYDROMORPHONE HYDROCHLORIDE 1 MG/ML
.3-.5 INJECTION, SOLUTION INTRAMUSCULAR; INTRAVENOUS; SUBCUTANEOUS EVERY 10 MIN PRN
Status: DISCONTINUED | OUTPATIENT
Start: 2018-08-15 | End: 2018-08-15 | Stop reason: HOSPADM

## 2018-08-15 RX ORDER — ALBUTEROL SULFATE 0.83 MG/ML
2.5 SOLUTION RESPIRATORY (INHALATION) EVERY 4 HOURS PRN
Status: DISCONTINUED | OUTPATIENT
Start: 2018-08-15 | End: 2018-08-15 | Stop reason: HOSPADM

## 2018-08-15 RX ORDER — LIDOCAINE HYDROCHLORIDE 20 MG/ML
INJECTION, SOLUTION INFILTRATION; PERINEURAL PRN
Status: DISCONTINUED | OUTPATIENT
Start: 2018-08-15 | End: 2018-08-15

## 2018-08-15 RX ORDER — CEFAZOLIN SODIUM 2 G/100ML
2 INJECTION, SOLUTION INTRAVENOUS
Status: COMPLETED | OUTPATIENT
Start: 2018-08-15 | End: 2018-08-15

## 2018-08-15 RX ORDER — PROPOFOL 10 MG/ML
INJECTION, EMULSION INTRAVENOUS CONTINUOUS PRN
Status: DISCONTINUED | OUTPATIENT
Start: 2018-08-15 | End: 2018-08-15

## 2018-08-15 RX ORDER — CEFAZOLIN SODIUM 1 G/3ML
1 INJECTION, POWDER, FOR SOLUTION INTRAMUSCULAR; INTRAVENOUS SEE ADMIN INSTRUCTIONS
Status: DISCONTINUED | OUTPATIENT
Start: 2018-08-15 | End: 2018-08-15 | Stop reason: HOSPADM

## 2018-08-15 RX ORDER — SODIUM CHLORIDE, SODIUM LACTATE, POTASSIUM CHLORIDE, CALCIUM CHLORIDE 600; 310; 30; 20 MG/100ML; MG/100ML; MG/100ML; MG/100ML
INJECTION, SOLUTION INTRAVENOUS CONTINUOUS
Status: DISCONTINUED | OUTPATIENT
Start: 2018-08-15 | End: 2018-08-15 | Stop reason: HOSPADM

## 2018-08-15 RX ORDER — KETOROLAC TROMETHAMINE 30 MG/ML
INJECTION, SOLUTION INTRAMUSCULAR; INTRAVENOUS PRN
Status: DISCONTINUED | OUTPATIENT
Start: 2018-08-15 | End: 2018-08-15

## 2018-08-15 RX ORDER — ONDANSETRON 4 MG/1
4 TABLET, ORALLY DISINTEGRATING ORAL EVERY 30 MIN PRN
Status: DISCONTINUED | OUTPATIENT
Start: 2018-08-15 | End: 2018-08-15 | Stop reason: HOSPADM

## 2018-08-15 RX ORDER — MAGNESIUM HYDROXIDE 1200 MG/15ML
LIQUID ORAL PRN
Status: DISCONTINUED | OUTPATIENT
Start: 2018-08-15 | End: 2018-08-15 | Stop reason: HOSPADM

## 2018-08-15 RX ORDER — ONDANSETRON 2 MG/ML
4 INJECTION INTRAMUSCULAR; INTRAVENOUS EVERY 30 MIN PRN
Status: DISCONTINUED | OUTPATIENT
Start: 2018-08-15 | End: 2018-08-15 | Stop reason: HOSPADM

## 2018-08-15 RX ORDER — MEPERIDINE HYDROCHLORIDE 25 MG/ML
12.5 INJECTION INTRAMUSCULAR; INTRAVENOUS; SUBCUTANEOUS
Status: DISCONTINUED | OUTPATIENT
Start: 2018-08-15 | End: 2018-08-15 | Stop reason: HOSPADM

## 2018-08-15 RX ORDER — NALOXONE HYDROCHLORIDE 0.4 MG/ML
.1-.4 INJECTION, SOLUTION INTRAMUSCULAR; INTRAVENOUS; SUBCUTANEOUS
Status: DISCONTINUED | OUTPATIENT
Start: 2018-08-15 | End: 2018-08-15 | Stop reason: HOSPADM

## 2018-08-15 RX ADMIN — PROPOFOL 15 MCG/KG/MIN: 10 INJECTION, EMULSION INTRAVENOUS at 12:42

## 2018-08-15 RX ADMIN — BACLOFEN 30 MG: 10 TABLET ORAL at 14:17

## 2018-08-15 RX ADMIN — LIDOCAINE HYDROCHLORIDE 60 MG: 20 INJECTION, SOLUTION INFILTRATION; PERINEURAL at 12:38

## 2018-08-15 RX ADMIN — CEFAZOLIN SODIUM 2 G: 2 INJECTION, SOLUTION INTRAVENOUS at 12:44

## 2018-08-15 RX ADMIN — BUPIVACAINE HYDROCHLORIDE 31 ML: 2.5 INJECTION, SOLUTION EPIDURAL; INFILTRATION; INTRACAUDAL; PERINEURAL at 13:47

## 2018-08-15 RX ADMIN — LIDOCAINE HYDROCHLORIDE 5 ML: 10 INJECTION, SOLUTION INFILTRATION; PERINEURAL at 10:30

## 2018-08-15 RX ADMIN — METHYLENE BLUE 6 ML: 10 INJECTION INTRAVENOUS at 12:47

## 2018-08-15 RX ADMIN — DEXMEDETOMIDINE HYDROCHLORIDE 4 MCG: 100 INJECTION, SOLUTION INTRAVENOUS at 13:26

## 2018-08-15 RX ADMIN — ONDANSETRON 4 MG: 2 INJECTION INTRAMUSCULAR; INTRAVENOUS at 14:05

## 2018-08-15 RX ADMIN — SODIUM CHLORIDE 500 ML: 900 IRRIGANT IRRIGATION at 12:59

## 2018-08-15 RX ADMIN — FENTANYL CITRATE 25 MCG: 50 INJECTION, SOLUTION INTRAMUSCULAR; INTRAVENOUS at 12:52

## 2018-08-15 RX ADMIN — DEXMEDETOMIDINE HYDROCHLORIDE 4 MCG: 100 INJECTION, SOLUTION INTRAVENOUS at 12:58

## 2018-08-15 RX ADMIN — FENTANYL CITRATE 25 MCG: 50 INJECTION, SOLUTION INTRAMUSCULAR; INTRAVENOUS at 12:46

## 2018-08-15 RX ADMIN — MIDAZOLAM 1 MG: 1 INJECTION INTRAMUSCULAR; INTRAVENOUS at 12:38

## 2018-08-15 RX ADMIN — HYDROCODONE BITARTRATE AND ACETAMINOPHEN 1 TABLET: 5; 325 TABLET ORAL at 14:31

## 2018-08-15 RX ADMIN — FENTANYL CITRATE 25 MCG: 50 INJECTION, SOLUTION INTRAMUSCULAR; INTRAVENOUS at 12:40

## 2018-08-15 RX ADMIN — SODIUM CHLORIDE, POTASSIUM CHLORIDE, SODIUM LACTATE AND CALCIUM CHLORIDE: 600; 310; 30; 20 INJECTION, SOLUTION INTRAVENOUS at 12:38

## 2018-08-15 RX ADMIN — TILMANOCEPT 0.5 MILLICURIE: KIT at 11:48

## 2018-08-15 RX ADMIN — FENTANYL CITRATE 25 MCG: 50 INJECTION, SOLUTION INTRAMUSCULAR; INTRAVENOUS at 13:01

## 2018-08-15 NOTE — BRIEF OP NOTE
[facility] Brief Operative Note    Pre-operative diagnosis: LEFT BREAST CANCER     Post-operative diagnosis Same as above   Procedure: Procedure(s):  LEFT SEED LOCALIZED BREAST LUMPECTOMY WITH SENTINEL NODE BIOPSY  - Wound Class: I-Clean   - Wound Class: I-Clean   Surgeon(s): Surgeon(s) and Role:     * Mariel Boucher MD - Primary   Estimated blood loss: 10 mL    Specimens:   ID Type Source Tests Collected by Time Destination   A : left breast lumpectomy  MARKED PER PROTOCOL Tissue Breast, Left SURGICAL PATHOLOGY EXAM Mariel Boucher MD 8/15/2018  1:05 PM    B : left axillary sentinel lymph node Tissue Axilla, Left SURGICAL PATHOLOGY EXAM Mariel Boucher MD 8/15/2018  1:20 PM    C : left axillary tissue Tissue Axilla, Left SURGICAL PATHOLOGY EXAM Mariel Boucher MD 8/15/2018  1:21 PM       Findings: Seed localized 8 mm tumor visualized within lumpectomy specimen on XR and sent to pathology. Three sentinel nodes located and excised and sent to pathology for gross and microscopic examination.    Patricia Cooper DO PGY4  Department of Surgery

## 2018-08-15 NOTE — ANESTHESIA POSTPROCEDURE EVALUATION
Patient: Mariela Allen    Procedure(s):  LEFT SEED LOCALIZED BREAST LUMPECTOMY WITH SENTINEL NODE BIOPSY  - Wound Class: I-Clean   - Wound Class: I-Clean    Diagnosis:LEFT BREAST CANCER    Diagnosis Additional Information: No value filed.    Anesthesia Type:  MAC    Note:  Anesthesia Post Evaluation    Patient location during evaluation: PACU  Patient participation: Able to fully participate in evaluation  Level of consciousness: awake  Pain management: adequate  Airway patency: patent  Cardiovascular status: acceptable  Respiratory status: acceptable  Hydration status: acceptable  PONV: controlled     Anesthetic complications: None          Last vitals:  Vitals:    08/15/18 1500 08/15/18 1530 08/15/18 1620   BP: 131/66 123/74 121/79   Pulse:      Resp: 10  16   Temp:      SpO2: 97%  98%         Electronically Signed By: Elvira Prado MD  August 15, 2018  5:39 PM

## 2018-08-15 NOTE — ANESTHESIA PREPROCEDURE EVALUATION
Anesthesia Evaluation     . Pt has had prior anesthetic.            ROS/MED HX    ENT/Pulmonary:     (+)sleep apnea, uses CPAP , . .    Neurologic: Comment: Neurogenic bladder    (+)Multiple Sclerosis    Parkinson's disease: non ambulatory, UE weakness.   Cardiovascular: Comment: IRBBB    (+) Dyslipidemia, hypertension----. : . . . :. .       METS/Exercise Tolerance:     Hematologic:         Musculoskeletal:         GI/Hepatic:        (-) GERD   Renal/Genitourinary:         Endo:         Psychiatric:         Infectious Disease:         Malignancy:   (+) Malignancy History of Lung          Other:                     Physical Exam      Airway   Mallampati: II  TM distance: >3 FB  Neck ROM: full    Dental   (+) caps    Cardiovascular   Rhythm and rate: regular      Pulmonary    breath sounds clear to auscultation                    Anesthesia Plan      History & Physical Review  History and physical reviewed and following examination; no interval change.    ASA Status:  4 .        Plan for MAC          Postoperative Care      Consents  Anesthetic plan, risks, benefits and alternatives discussed with:  Patient..                          .  Procedure: Procedure(s):  LUMPECTOMY BREAST WITH SEED LOCALIZATION  BIOPSY NODE SENTINEL  Preop diagnosis: LEFT BREAST CANCER      Allergies   Allergen Reactions     Solu-Medrol [Methylprednisolone]      High Dose caused Delirium     Past Medical History:   Diagnosis Date     Breast cancer (H)     Invasive ductal carcinoma of Left brst     Cancer (H)     Basel cell Skin cancer      Hyperlipidemia      Hypertension      Multiple sclerosis (H)      Neurogenic bladder      Past Surgical History:   Procedure Laterality Date     ORTHOPEDIC SURGERY      ankle     Social History   Substance Use Topics     Smoking status: Never Smoker     Smokeless tobacco: Never Used     Alcohol use Yes      Comment: drinks wine almost daily      Prior to Admission medications    Medication Sig Start Date  End Date Taking? Authorizing Provider   NITROFURANTOIN MONOHYD MACRO PO Take 100 mg by mouth 2 times daily   Yes Reported, Patient   baclofen (LIORESAL) 10 MG tablet Take 3 tablets by mouth 4 times daily 6/2/10   Unknown, Entered By History   Calcium Citrate-Vitamin D (CITRACAL + D PO)     Reported, Patient   Cholecalciferol (VITAMIN D PO) Take 1,000 Units by mouth     Reported, Patient   CIPROFLOXACIN PO     Reported, Patient   Cyanocobalamin (VITAMIN B12 PO) Take 100 mcg by mouth     Reported, Patient   diazepam (VALIUM) 5 MG tablet Take 5 mg by mouth 3 times daily as needed    Unknown, Entered By History   HYDROcodone-acetaminophen (VICODIN) 5-500 MG per tablet Take 1 tablet by mouth every 6 hours as needed For pain. Max acetaminophen dose: 4000 mg in 24hrs. 6/2/10   Unknown, Entered By History   imipramine (TOFRANIL) 10 MG tablet Take 10 mg by mouth At Bedtime  6/17/16   Unknown, Entered By History   pramipexole (MIRAPEX) 0.25 MG tablet Take 0.125 mg by mouth At Bedtime Take 2-3 hours before bedtime    Unknown, Entered By History   traMADol (ULTRAM) 50 MG tablet Take 50 mg by mouth 2 times daily    Unknown, Entered By History     Current Facility-Administered Medications Ordered in Epic   Medication Dose Route Frequency Last Rate Last Dose     ceFAZolin (ANCEF) 1 g vial to attach to  ml bag for ADULT or 50 ml bag for PEDS  1 g Intravenous See Admin Instructions         ceFAZolin (ANCEF) intermittent infusion 2 g in 100 mL dextrose PRE-MIX  2 g Intravenous Pre-Op/Pre-procedure x 1 dose         No current [facility]-ordered outpatient prescriptions on file.       Wt Readings from Last 1 Encounters:   07/17/18 50.8 kg (112 lb)     Temp Readings from Last 1 Encounters:   12/14/16 36.7  C (98.1  F) (Oral)     BP Readings from Last 6 Encounters:   07/17/18 137/85   12/14/16 164/77   11/23/16 98/53     Pulse Readings from Last 4 Encounters:   07/17/18 89   12/14/16 68   11/20/16 83     Resp Readings from Last 1  Encounters:   12/14/16 16     SpO2 Readings from Last 1 Encounters:   12/14/16 99%     Recent Labs   Lab Test  11/20/16   1905   NA  141   POTASSIUM  3.6   CHLORIDE  99   CO2  33*   ANIONGAP  9   GLC  107*   BUN  21   CR  0.61   KIMBER  9.1     No results for input(s): AST, ALT, ALKPHOS, BILITOTAL, LIPASE in the last 86522 hours.  Recent Labs   Lab Test  11/20/16   1905   WBC  10.4   HGB  13.4   PLT  347     No results for input(s): ABO, RH in the last 86247 hours.  No results for input(s): INR, PTT in the last 05188 hours.   No results for input(s): TROPI in the last 56590 hours.  No results for input(s): PH, PCO2, PO2, HCO3 in the last 80598 hours.  No results for input(s): HCG in the last 79645 hours.  Recent Results (from the past 744 hour(s))   US Breast Radioactive Seed Placement, 1St Lesion Left    Narrative    LEFT BREAST SEED LOCALIZATION;  POST SEED PLACEMENT LEFT DIGITAL MAMMOGRAM;  8/15/2018 10:30 AM     HISTORY: Invasive ductal carcinoma at the 1:00 position of the left  breast.    COMPARISON: Bilateral breast ultrasound, 7/2/2018    PROCEDURE:  Informed consent was obtained prior to the procedure.  The  skin overlying the target lesion was marked, sterilized and  anesthetized using approximately 3 mL 1% lidocaine.  Using continuous  imaging guidance, the radioactive seed was deployed adjacent to the  target lesion.  Adequate placement was confirmed with imaging.  There  were no complications.    Post procedure mammogram confirms satisfactory positioning of the  seed.  The images were annotated and provided to the surgeon.      Impression    IMPRESSION: Successful placement of a radioactive seed for operative  guidance.      ISABEL FARIA MD MA Post Procedure Left    Narrative    LEFT BREAST SEED LOCALIZATION;  POST SEED PLACEMENT LEFT DIGITAL MAMMOGRAM;  8/15/2018 10:30 AM     HISTORY: Invasive ductal carcinoma at the 1:00 position of the left  breast.    COMPARISON: Bilateral breast ultrasound,  7/2/2018    PROCEDURE:  Informed consent was obtained prior to the procedure.  The  skin overlying the target lesion was marked, sterilized and  anesthetized using approximately 3 mL 1% lidocaine.  Using continuous  imaging guidance, the radioactive seed was deployed adjacent to the  target lesion.  Adequate placement was confirmed with imaging.  There  were no complications.    Post procedure mammogram confirms satisfactory positioning of the  seed.  The images were annotated and provided to the surgeon.      Impression    IMPRESSION: Successful placement of a radioactive seed for operative  guidance.      ISABEL FARIA MD       RECENT LABS:   ECG:   ECHO:

## 2018-08-15 NOTE — PROGRESS NOTES
SBAR Seed Localization    SITUATION:  Patient to breast imaging center for imaging guided seed localizations before breast lumpectomy or excision biopsy with sentinel node injection.    BACKGROUND:  Breast imaging cancer, breast abnormality  Ordered procedure completed: Yes  Special needs identified: Yes     ASSESSMENT:  SBAR report called to patient care unit because of unexpected event in radiology: No  Allergies and medication list reviewed prior to procedure. Yes  Skin cleansed with ChloraPrep One-Step.  Anesthesia: approximately 5ml of 1% Lidocaine injection subcutaneous before seed insertion administered by the radiologist.   Gauze dressing over insertion site(s).  Post procedure mammogram completed: Yes    Patient tolerance:well    RECOMMENDATIONS:  Patient transferred to Same Day Surgery in stable condition via wheelchair with Breast Imaging Staff.  Copy of note given to patient and instructions to hand this note to surgery staff.    Please call [facility] [phone] if there are any questions.

## 2018-08-15 NOTE — IP AVS SNAPSHOT
MRN:[id number]                      After Visit Summary   8/15/2018    Mariela Allen    MRN: [id number]           Thank you!     Thank you for choosing [facility] for your care. Our goal is always to provide you with excellent care. Hearing back from our patients is one way we can continue to improve our services. Please take a few minutes to complete the written survey that you may receive in the mail after you visit with us. Thank you!        Patient Information     Date Of Birth          1950        About your hospital stay     You were admitted on:  August 15, 2018 You last received care in the:  [facility] Same Day Surgery    You were discharged on:  August 15, 2018       Who to Call     For medical emergencies, please call 911.  For non-urgent questions about your medical care, please call your primary care provider or clinic, [phone]  For questions related to your surgery, please call your surgery clinic        Attending Provider     Provider Specialty    Mariel Boucher MD Surgery       Primary Care Provider Office Phone # Fax #    Apurva Jackson PA-C [phone]      After Care Instructions     Diet Instructions       Resume pre-procedure diet            Discharge Instructions       Patient to follow up with appointment in 2 weeks with Dr. Boucher.            Dressing       Keep dressing clean and dry.  Dressing / incisional care as instructed by RN and or Surgeon.     1) You can remove dressings (bandaids) in 48 hours and shower  2) Do not soak or scrub your incisions  3) Leave the steri-strips alone and let them fall off on their own  4) Keep your incisions clean and dry            No Alcohol       For 24 hours post procedure            No driving or operating machinery        until the day after procedure            Shower       No shower for 48 hours post procedure. May shower Postoperative Day (POD)  2                  Further instructions  from your care team       Same Day Surgery Discharge Instructions for  Sedation and General Anesthesia       It's not unusual to feel dizzy, light-headed or faint for up to 24 hours after surgery or while taking pain medication.  If you have these symptoms: sit for a few minutes before standing and have someone assist you when you get up to walk or use the bathroom.      You should rest and relax for the next 24 hours. We recommend you make arrangements to have an adult stay with you for at least 24 hours after your discharge.  Avoid hazardous and strenuous activity.      DO NOT DRIVE any vehicle or operate mechanical equipment for 24 hours following the end of your surgery.  Even though you may feel normal, your reactions may be affected by the medication you have received.      Do not drink alcoholic beverages for 24 hours following surgery.       Slowly progress to your regular diet as you feel able. It's not unusual to feel nauseated and/or vomit after receiving anesthesia.  If you develop these symptoms, drink clear liquids (apple juice, ginger ale, broth, 7-up, etc. ) until you feel better.  If your nausea and vomiting persists for 24 hours, please notify your surgeon.        All narcotic pain medications, along with inactivity and anesthesia, can cause constipation. Drinking plenty of liquids and increasing fiber intake will help.      For any questions of a medical nature, call your surgeon.      Do not make important decisions for 24 hours.      If you had general anesthesia, you may have a sore throat for a couple of days related to the breathing tube used during surgery.  You may use Cepacol lozenges to help with this discomfort.  If it worsens or if you develop a fever, contact your surgeon.       If you feel your pain is not well managed with the pain medications prescribed by your surgeon, please contact your surgeon's office to let them know so they can address your concerns.           Pending Results  "    Date and Time Order Name Status Description    8/15/2018 1310 Surgical pathology exam In process     8/15/2018 1004 MA Breast Specimen Left OR In process             Admission Information     Date & Time Provider Department Dept. Phone    8/15/2018 Mariel Boucher MD [facility] Same Day Surgery [phone]      Your Vitals Were     Blood Pressure Pulse Temperature Respirations Height Weight    128/67 82 97.7  F (36.5  C) 10 1.575 m (5' 2\") 60.1 kg (132 lb 8 oz)    Pulse Oximetry BMI (Body Mass Index)                97% 24.23 kg/m2          Care EveryWhere ID     This is your Care EveryWhere ID. This could be used by other organizations to access your [facility] medical records  [id number]        Equal Access to Services     [place]YS : Juwan kwano Sosudheer, waaxda luqadaha, qaybta kaalmada adeegyada, david little. So [facility] [phone].    ATENCIÓN: Si habla español, tiene a delatorre disposición servicios gratuitos de asistencia lingüística. Llame al [phone].    We comply with applicable federal civil rights laws and [place] laws. We do not discriminate on the basis of race, color, national origin, age, disability, sex, sexual orientation, or gender identity.               Review of your medicines      CONTINUE these medicines which may have CHANGED, or have new prescriptions. If we are uncertain of the size of tablets/capsules you have at home, strength may be listed as something that might have changed.        Dose / Directions    * HYDROcodone-acetaminophen 5-500 MG per tablet   Commonly known as:  VICODIN   This may have changed:  Another medication with the same name was added. Make sure you understand how and when to take each.        Dose:  1 tablet   Take 1 tablet by mouth every 6 hours as needed For pain. Max acetaminophen dose: 4000 mg in 24hrs.   Refills:  0       * HYDROcodone-acetaminophen 5-325 MG per tablet   Commonly known as:  NORCO   This may " have changed:  You were already taking a medication with the same name, and this prescription was added. Make sure you understand how and when to take each.   Used for:  S/P lumpectomy, left breast   Notes to Patient:  Took 1 tablet today at 2:31pm        Dose:  1-2 tablet   Take 1-2 tablets by mouth every 4 hours as needed for other (Moderate to Severe Pain)   Quantity:  20 tablet   Refills:  0       * Notice:  This list has 2 medication(s) that are the same as other medications prescribed for you. Read the directions carefully, and ask your doctor or other care provider to review them with you.      CONTINUE these medicines which have NOT CHANGED        Dose / Directions    baclofen 10 MG tablet   Commonly known as:  LIORESAL        Dose:  3 tablet   Take 3 tablets by mouth 4 times daily   Refills:  0       CIPROFLOXACIN PO        Refills:  0       CITRACAL + D PO        Refills:  0       diazepam 5 MG tablet   Commonly known as:  VALIUM        Dose:  5 mg   Take 5 mg by mouth 3 times daily as needed   Refills:  0       imipramine 10 MG tablet   Commonly known as:  TOFRANIL        Dose:  10 mg   Take 10 mg by mouth At Bedtime   Refills:  0       pramipexole 0.25 MG tablet   Commonly known as:  MIRAPEX        Dose:  0.125 mg   Take 0.125 mg by mouth At Bedtime Take 2-3 hours before bedtime   Refills:  0       traMADol 50 MG tablet   Commonly known as:  ULTRAM        Dose:  50 mg   Take 50 mg by mouth 2 times daily   Refills:  0       VITAMIN B12 PO        Dose:  100 mcg   Take 100 mcg by mouth   Refills:  0       VITAMIN D PO        Dose:  1500 Units   Take 1,500 Units by mouth   Refills:  0            Where to get your medicines      Some of these will need a paper prescription and others can be bought over the counter. Ask your nurse if you have questions.     Bring a paper prescription for each of these medications     HYDROcodone-acetaminophen 5-325 MG per tablet                Protect others around you: Learn  how to safely use, store and throw away your medicines at www.disposemymeds.org.        Information about OPIOIDS     PRESCRIPTION OPIOIDS: WHAT YOU NEED TO KNOW   We gave you an opioid (narcotic) pain medicine. It is important to manage your pain, but opioids are not always the best choice. You should first try all the other options your care team gave you. Take this medicine for as short a time (and as few doses) as possible.    Some activities can increase your pain, such as bandage changes or therapy sessions. It may help to take your pain medicine 30 to 60 minutes before these activities. Reduce your stress by getting enough sleep, working on hobbies you enjoy and practicing relaxation or meditation. Talk to your care team about ways to manage your pain beyond prescription opioids.    These medicines have risks:    DO NOT drive when on new or higher doses of pain medicine. These medicines can affect your alertness and reaction times, and you could be arrested for driving under the influence (DUI). If you need to use opioids long-term, talk to your care team about driving.    DO NOT operate heavy machinery    DO NOT do any other dangerous activities while taking these medicines.    DO NOT drink any alcohol while taking these medicines.     If the opioid prescribed includes acetaminophen, DO NOT take with any other medicines that contain acetaminophen. Read all labels carefully. Look for the word  acetaminophen  or  Tylenol.  Ask your pharmacist if you have questions or are unsure.    You can get addicted to pain medicines, especially if you have a history of addiction (chemical, alcohol or substance dependence). Talk to your care team about ways to reduce this risk.    All opioids tend to cause constipation. Drink plenty of water and eat foods that have a lot of fiber, such as fruits, vegetables, prune juice, apple juice and high-fiber cereal. Take a laxative (Miralax, milk of magnesia, Colace, Senna) if you  don t move your bowels at least every other day. Other side effects include upset stomach, sleepiness, dizziness, throwing up, tolerance (needing more of the medicine to have the same effect), physical dependence and slowed breathing.    Store your pills in a secure place, locked if possible. We will not replace any lost or stolen medicine. If you don t finish your medicine, please throw away (dispose) as directed by your pharmacist. The [place] [employer]ution [employer] has more information about safe disposal: https://www.pca.[facility].[place].us/living-green/managing-unwanted-medications             Medication List: This is a list of all your medications and when to take them. Check marks below indicate your daily home schedule. Keep this list as a reference.      Medications           Morning Afternoon Evening Bedtime As Needed    baclofen 10 MG tablet   Commonly known as:  LIORESAL   Take 3 tablets by mouth 4 times daily   Last time this was given:  30 mg on 8/15/2018  2:17 PM                                CIPROFLOXACIN PO                                CITRACAL + D PO                                diazepam 5 MG tablet   Commonly known as:  VALIUM   Take 5 mg by mouth 3 times daily as needed                                * HYDROcodone-acetaminophen 5-500 MG per tablet   Commonly known as:  VICODIN   Take 1 tablet by mouth every 6 hours as needed For pain. Max acetaminophen dose: 4000 mg in 24hrs.                                * HYDROcodone-acetaminophen 5-325 MG per tablet   Commonly known as:  NORCO   Take 1-2 tablets by mouth every 4 hours as needed for other (Moderate to Severe Pain)   Last time this was given:  1 tablet on 8/15/2018  2:31 PM   Notes to Patient:  Took 1 tablet today at 2:31pm                                imipramine 10 MG tablet   Commonly known as:  TOFRANIL   Take 10 mg by mouth At Bedtime                                pramipexole 0.25 MG tablet   Commonly known as:  MIRAPEX   Take 0.125 mg  by mouth At Bedtime Take 2-3 hours before bedtime                                traMADol 50 MG tablet   Commonly known as:  ULTRAM   Take 50 mg by mouth 2 times daily                                VITAMIN B12 PO   Take 100 mcg by mouth                                VITAMIN D PO   Take 1,500 Units by mouth                                * Notice:  This list has 2 medication(s) that are the same as other medications prescribed for you. Read the directions carefully, and ask your doctor or other care provider to review them with you.

## 2018-08-15 NOTE — OP NOTE
[facility] Breast Surgery Operative Note      Pre-operative diagnosis: Left breast invasive ductal carcinoma   Post-operative diagnosis: Left breast invasive ductal carcinoma     Procedure: 1.  LEFT SEED LOCALIZED PARTIAL MASTECTOMY  2.  LEFT SENTINEL LYMPH NODE BIOPSY  3.  INTRAOPERATIVE INJECTION OF METHYLENE BLUE TRACER     Surgeon: Mariel Boucher MD   Assistant(s):  Patricia Cooper MD [facility] Resident  The PA s assistance was medically necessary to provide adequate exposure in the operating field, maintain hemostasis, cutting suture, clamping and ligating bleeding vessels, and visualization of anatomic structures throughout the surgical procedure.      Anesthesia: Local with MAC    Estimated blood loss:   10 cc     Specimens:   ID Type Source Tests Collected by Time Destination   A : left breast lumpectomy  MARKED PER PROTOCOL Tissue Breast, Left SURGICAL PATHOLOGY EXAM Mariel Boucher MD 8/15/2018  1:05 PM    B : left axillary sentinel lymph node Tissue Axilla, Left SURGICAL PATHOLOGY EXAM Mariel Boucher MD 8/15/2018  1:20 PM    C : left axillary tissue Tissue Axilla, Left SURGICAL PATHOLOGY EXAM Mariel Boucher MD 8/15/2018  1:21 PM         INDICATION:  Mariela is a 68yof w PMH s/f multiple sclerosis and mitral stenosis who presented with a concerning area on her mammogram. This was biopsied and found to be grade one invasive ductal carcinoma. After a discussion of options she elected to proceed with left breast lumpectomy and SLNB.   DESCRIPTION OF PROCEDURE: The patient was placed on the table in supine position. Conscious sedation was induced. Perioperative antibiotics were given. 4 ml of methylene blue dye was injected in subareolar position within the deep dermal lymphatics. The left breast and axilla were prepped and draped in standard sterile fashion.  We used the seed placed in the [facility] as well as the post-seed mammograms to localize the area of interest. We made a transverse incision  centered at the 1 o'clock position. We carried the incision down using electrocautery into the breast tissue and excised the area of interest, including the seed.  The neoprobe was used to guide the dissection. The mass was removed in its entirety with some surrounding benign appearing breast tissue.  After the specimen was removed it had a high signal with the neoprobe. Once the mass was removed, it was oriented with Osborne dyes. A specimen mammogram was obtained and revealed the clip, seed, and mass. The specimen was then sent to pathology for review.  The wound was then examined for bleeding and hemostasis was achieved using electrocautery.    Hemostasis was maintained throughout with electrocautery. The field was irrigated with sterile saline.     Clips were placed at the 12, 3, 6, and 9 o'clock positions of the lumpectomy cavity as well as posterior and anterior.  The lumpectomy cavity was reapproximated with several interrupted 3-0 vicryl sutures. The skin was closed with a deep dermal 3-0 vicryl and running 4-0 Monocryl subcuticular suture and steri strips.    We than began the sentinel lymph node biopsy. The patient had been injected with a radionuclear pharmaceutical preoperatively.  We used the Neoprobe to localize an area of increased activity in the axilla. We made an incision in the skin overlying that area of activity. The incision was carried down into the subcutaneous tissue and into the axillary space with the Bovie electrocautery. We then localized an area of increased activity, and isolated a lymph node from surrounding tissues. The lymph node had a count of 35 and was blue, a second node was identified with counts of 164 and was also blue. These were sent as left axillary sentinel lymph nodes. A third node was identified and felt to have counts invivo and did not have counts ex vivo. This was sent as left axillary orly tissue.     The remainder of the axilla had no foci of increased  radioactivity. There were no clinically positive nodes upon thorough evaluation by palpation of the axilla.   Hemostasis was assured.  We then closed the incision with interrupted subcutaneous 3-0 Vicryl sutures, a running 4-0 Monocryl subcuticular suture and Steri strips. The nodes were sent to pathology for routine evaluation. The patient tolerated the procedure well.  Sponge and instrument counts were correct.    Mariel Boucher MD  [facility]  [phone]

## 2018-08-15 NOTE — OR NURSING
Real escobar deferred per Dr. Boucher.  Patient incontinent of urine.  States baclofen is due at noon, did not bring her meds to the hospital.  [facility] notified.

## 2018-08-15 NOTE — IP AVS SNAPSHOT
[facility] Same Day Surgery    [address]    [place] [address]    Phone:  [phone]    Fax:  [phone]                                       After Visit Summary   8/15/2018    Mariela Allen    MRN: [id number]           After Visit Summary Signature Page     I have received my discharge instructions, and my questions have been answered. I have discussed any challenges I see with this plan with the nurse or doctor.    ..........................................................................................................................................  Patient/Patient Representative Signature      ..........................................................................................................................................  Patient Representative Print Name and Relationship to Patient    ..................................................               ................................................  Date                                            Time    ..........................................................................................................................................  Reviewed by Signature/Title    ...................................................              ..............................................  Date                                                            Time

## 2018-08-15 NOTE — ANESTHESIA CARE TRANSFER NOTE
Patient: Mariela Allen    Procedure(s):  LEFT SEED LOCALIZED BREAST LUMPECTOMY WITH SENTINEL NODE BIOPSY  - Wound Class: I-Clean   - Wound Class: I-Clean    Diagnosis: LEFT BREAST CANCER    Diagnosis Additional Information: No value filed.    Anesthesia Type:   MAC     Note:  Airway :Face Mask  Patient transferred to:PACU  Comments: At end of procedure, spontaneous respirations, patient alert to voice, able to follow commands. Oxygen via facemask at 10 liters per minute to PACU. Oxygen tubing connected to wall O2 in PACU, SpO2, NiBP, and EKG monitors and alarms on and functioning, Isaiah Hugger warmer connected to patient gown, report on patient's clinical status given to PACU RN, RN questions answered.Handoff Report: Identifed the Patient, Identified the Reponsible Provider, Reviewed the pertinent medical history, Discussed the surgical course, Reviewed Intra-OP anesthesia mangement and issues during anesthesia, Set expectations for post-procedure period and Allowed opportunity for questions and acknowledgement of understanding      Vitals: (Last set prior to Anesthesia Care Transfer)    CRNA VITALS  8/15/2018 1336 - 8/15/2018 1414      8/15/2018             Pulse: 81    SpO2: 99 %    Resp Rate (set): 10                Electronically Signed By: ADRIANA Davis CRNA  August 15, 2018  2:14 PM

## 2018-08-16 LAB — COPATH REPORT: NORMAL

## 2018-08-17 ENCOUNTER — TELEPHONE (OUTPATIENT)
Dept: SURGERY | Facility: CLINIC | Age: 68
End: 2018-08-17

## 2018-08-17 NOTE — TELEPHONE ENCOUNTER
I called Mariela with her pathology report. She had a 1.3cm invasive ductal carcinoma. Margins are negative and nodes are negative. She is doing well. I will order an oncotype now and place orders for referral to radiation oncology and oncology at her follow up appt.     Mariel Boucher MD  [facility]  [phone]

## 2018-08-28 ENCOUNTER — OFFICE VISIT (OUTPATIENT)
Dept: SURGERY | Facility: CLINIC | Age: 68
End: 2018-08-28
Payer: COMMERCIAL

## 2018-08-28 VITALS
BODY MASS INDEX: 24.29 KG/M2 | HEIGHT: 62 IN | HEART RATE: 80 BPM | WEIGHT: 132 LBS | SYSTOLIC BLOOD PRESSURE: 110 MMHG | DIASTOLIC BLOOD PRESSURE: 72 MMHG

## 2018-08-28 DIAGNOSIS — Z17.0 MALIGNANT NEOPLASM OF UPPER-OUTER QUADRANT OF LEFT BREAST IN FEMALE, ESTROGEN RECEPTOR POSITIVE (H): ICD-10-CM

## 2018-08-28 DIAGNOSIS — Z09 FOLLOW-UP EXAM: Primary | ICD-10-CM

## 2018-08-28 DIAGNOSIS — C50.412 MALIGNANT NEOPLASM OF UPPER-OUTER QUADRANT OF LEFT BREAST IN FEMALE, ESTROGEN RECEPTOR POSITIVE (H): ICD-10-CM

## 2018-08-28 PROCEDURE — 99024 POSTOP FOLLOW-UP VISIT: CPT | Performed by: SURGERY

## 2018-08-28 RX ORDER — CEPHALEXIN 500 MG/1
500 CAPSULE ORAL 3 TIMES DAILY
Qty: 21 CAPSULE | Refills: 0 | Status: SHIPPED | OUTPATIENT
Start: 2018-08-28 | End: 2018-09-04

## 2018-08-28 NOTE — PROGRESS NOTES
[facility] Breast Surgery Postoperative Note    S: Mariela returns for two week follow up after left breast lumpectomy and SLNB for a 1.3cm grade 1 invasive ductal carcinoma. Margins were negative. There were 5 negative lymph nodes.     She reports she is doing well. Minimal left breast pain. No fevers or chills.     Breasts: left breast incisions healing well. There is some mild erythema across her left breast with warmth present.     Pathology: reviewed and copy given.     A/P  Mariela Allen is recovering from a left breast lumpectomy and SLNB for a [id number] grade 2, 1.3cm invasive ductal carcinoma, ER/FL positive, Her 2 negative.  She is doing well overall. With the slight erythema/edema to the left breast - I would like to treat her with keflex for 7 days. She will follow up with me in 1-2 weeks to reassess.    Referrals were placed to Oncology and Radiation Oncology.   Oncotype was previously ordered and pending .     Thank you for the opportunity to help in her care.    Mariel Boucher  [facility]  [phone]    Please route or send letter to:  Primary Care Provider (PCP) and Referring Provider

## 2018-08-28 NOTE — Clinical Note
Can you call to assist Mariela in scheduling with Dr. Mckeon (oncology) and Dr. Salazar (Rad Onc). I would like to see her back in 1-2 weeks for follow up as well. Thanks!

## 2018-08-28 NOTE — LETTER
2018    Re: Mariela Allen - 1950    [facility] Breast Surgery Postoperative Note     S: Mariela returns for two week follow up after left breast lumpectomy and SLNB for a 1.3cm grade 1 invasive ductal carcinoma. Margins were negative. There were 5 negative lymph nodes.      She reports she is doing well. Minimal left breast pain. No fevers or chills.      Breasts: left breast incisions healing well. There is some mild erythema across her left breast with warmth present.      Pathology: reviewed and copy given.      A/P  Mariela Allen is recovering from a left breast lumpectomy and SLNB for a [id number] grade 2, 1.3cm invasive ductal carcinoma, ER/MO positive, Her 2 negative.  She is doing well overall. With the slight erythema/edema to the left breast - I would like to treat her with keflex for 7 days. She will follow up with me in 1-2 weeks to reassess.    Referrals were placed to Oncology and Radiation Oncology.   Oncotype was previously ordered and pending .      Thank you for the opportunity to help in her care.     Mariel Boucher  [facility]  [phone]

## 2018-08-28 NOTE — MR AVS SNAPSHOT
After Visit Summary   8/28/2018    Mariela Allen    MRN: [id number]           Patient Information     Date Of Birth          1950        Visit Information        Provider Department      8/28/2018 2:00 PM Mariel Boucher MD [facility] General Surgery      Today's Diagnoses     Follow-up exam    -  1    Malignant neoplasm of upper-outer quadrant of left breast in female, estrogen receptor positive (H)           Follow-ups after your visit        Additional Services     ONC/HEME ADULT REFERRAL       Your provider has referred you to: [facility]: [place] [facility] [phone]   [email or web address]    Please be aware that coverage of these services is subject to the terms and limitations of your health insurance plan.  Call member services at your health plan with any benefit or coverage questions.      Please bring the following with you to your appointment:    (1) Any X-Rays, CTs or MRIs which have been performed.  Contact the facility where they were done to arrange for  prior to your scheduled appointment.   (2) List of current medications  (3) This referral request   (4) Any documents/labs given to you for this referral            RAD ONCOLOGY REFERRAL       Your provider has referred you to: radiation oncology    Please be aware that coverage of these services is subject to the terms and limitations of your health insurance plan.  Call member services at your health plan with any benefit or coverage questions.      Please bring the following with you to your appointment:    (1) Any X-Rays, CTs or MRIs which have been performed.  Contact the facility where they were done to arrange for  prior to your scheduled appointment.    (2) List of current medications   (3) This referral request   (4) Any documents/labs given to you for this referral                  Who to  "contact     If you have questions or need follow up information about today's clinic visit or your schedule please contact [facility] directly at [phone].  Normal or non-critical lab and imaging results will be communicated to you by MyChart, letter or phone within 4 business days after the clinic has received the results. If you do not hear from us within 7 days, please contact the clinic through MyChart or phone. If you have a critical or abnormal lab result, we will notify you by phone as soon as possible.  Submit refill requests through [employer] or call your pharmacy and they will forward the refill request to us. Please allow 3 business days for your refill to be completed.          Additional Information About Your Visit        Care EveryWhere ID     This is your Care EveryWhere ID. This could be used by other organizations to access your [facility] medical records  [id number]        Your Vitals Were     Pulse Height BMI (Body Mass Index)             80 5' 2\" (1.575 m) 24.14 kg/m2          Blood Pressure from Last 3 Encounters:   08/28/18 110/72   08/15/18 121/79   07/17/18 137/85    Weight from Last 3 Encounters:   08/28/18 132 lb (59.9 kg)   08/15/18 132 lb 8 oz (60.1 kg)   07/17/18 112 lb (50.8 kg)              We Performed the Following     ONC/HEME ADULT REFERRAL     RAD ONCOLOGY REFERRAL          Today's Medication Changes          These changes are accurate as of 8/28/18  2:39 PM.  If you have any questions, ask your nurse or doctor.               Start taking these medicines.        Dose/Directions    cephALEXin 500 MG capsule   Commonly known as:  KEFLEX   Used for:  Follow-up exam   Started by:  Mariel Boucher MD        Dose:  500 mg   Take 1 capsule (500 mg) by mouth 3 times daily for 7 days   Quantity:  21 capsule   Refills:  0            Where to get your medicines      These medications were sent to [facility] [place] - [address], [address]  [address], [address], [place] [address]     Phone:  [phone]     cephALEXin 500 MG capsule                Primary Care Provider Office Phone # Fax #    Apurva Jackson PA-C [phone] [address]  [place] [address]        Equal Access to Services     [facility]: Hadii aad ku hadasho Soomaali, waaxda luqadaha, qaybta kaalmada adeegyada, waxay idiin hayaan adeeg kharash la'aan . So [facility] [phone].    ATENCIÓN: Si habla español, tiene a delatorre disposición servicios gratuitos de asistencia lingüística. [facility] [phone].    We comply with applicable federal civil rights laws and [place] laws. We do not discriminate on the basis of race, color, national origin, age, disability, sex, sexual orientation, or gender identity.            Thank you!     Thank you for choosing [facility]  for your care. Our goal is always to provide you with excellent care. Hearing back from our patients is one way we can continue to improve our services. Please take a few minutes to complete the written survey that you may receive in the mail after your visit with us. Thank you!             Your Updated Medication List - Protect others around you: Learn how to safely use, store and throw away your medicines at www.disposemymeds.org.          This list is accurate as of 8/28/18  2:39 PM.  Always use your most recent med list.                   Brand Name Dispense Instructions for use Diagnosis    baclofen 10 MG tablet    LIORESAL     Take 3 tablets by mouth 4 times daily        cephALEXin 500 MG capsule    KEFLEX    21 capsule    Take 1 capsule (500 mg) by mouth 3 times daily for 7 days    Follow-up exam       CIPROFLOXACIN PO           CITRACAL + D PO           diazepam 5 MG tablet    VALIUM     Take 5 mg by mouth 3 times daily as needed        * HYDROcodone-acetaminophen 5-500 MG per tablet    VICODIN     Take 1 tablet by mouth  every 6 hours as needed For pain. Max acetaminophen dose: 4000 mg in 24hrs.        * HYDROcodone-acetaminophen 5-325 MG per tablet    NORCO    20 tablet    Take 1-2 tablets by mouth every 4 hours as needed for other (Moderate to Severe Pain)    S/P lumpectomy, left breast       imipramine 10 MG tablet    TOFRANIL     Take 10 mg by mouth At Bedtime        pramipexole 0.25 MG tablet    MIRAPEX     Take 0.125 mg by mouth At Bedtime Take 2-3 hours before bedtime        traMADol 50 MG tablet    ULTRAM     Take 50 mg by mouth 2 times daily        VITAMIN B12 PO      Take 100 mcg by mouth        VITAMIN D PO      Take 1,500 Units by mouth        * Notice:  This list has 2 medication(s) that are the same as other medications prescribed for you. Read the directions carefully, and ask your doctor or other care provider to review them with you.

## 2018-09-11 ENCOUNTER — OFFICE VISIT (OUTPATIENT)
Dept: SURGERY | Facility: CLINIC | Age: 68
End: 2018-09-11
Payer: COMMERCIAL

## 2018-09-11 DIAGNOSIS — Z09 SURGERY FOLLOW-UP EXAMINATION: Primary | ICD-10-CM

## 2018-09-11 PROCEDURE — 99024 POSTOP FOLLOW-UP VISIT: CPT | Performed by: SURGERY

## 2018-09-11 NOTE — PROGRESS NOTES
Surgery Follow Up Note    Mariela returns for recheck of her left lumpectomy incision. She completed a 7 day course of keflex which she tolerated well.     O: Left breast: incision well healed. No further erythema. There is some edema throughout the left breast compared to right.     A/P: Mariela is a 68yof s/p left breast lumpectomy and SLNB who had cellulitis at her prior appt which has now resolved after a course of keflex. She has appt with Oncology and radiation oncology for follow up. Her oncotype has not returned yet. She will follow up with me in 6 months, sooner with questions or concerns.     Mariel Boucher MD  [facility]  [phone]

## 2018-09-11 NOTE — LETTER
2018    Re: Mariela Bhardwajborn - 1950    Mariela returns for recheck of her left lumpectomy incision. She completed a 7 day course of keflex which she tolerated well.      O: Left breast: incision well healed. No further erythema. There is some edema throughout the left breast compared to right.      A/P: Mariela is a 68yof s/p left breast lumpectomy and SLNB who had cellulitis at her prior appt which has now resolved after a course of keflex. She has appt with Oncology and radiation oncology for follow up. Her oncotype has not returned yet. She will follow up with me in 6 months, sooner with questions or concerns.      Mariel Boucher MD  [facility]  [phone]

## 2018-09-11 NOTE — MR AVS SNAPSHOT
After Visit Summary   9/11/2018    Mariela Allen    MRN: [id number]           Patient Information     Date Of Birth          1950        Visit Information        Provider Department      9/11/2018 2:00 PM Mariel Boucher MD [facility] Surgery      Today's Diagnoses     Surgery follow-up examination    -  1      Care Instructions    You are scheduled for the following appointments:   1) Dr. Katherine Mckeon at [facility] 9/13/18 at 1:40pm   2) Dr. Moe Salazar at [facility] on 9/14/18 at 9am          Follow-ups after your visit        Who to contact     If you have questions or need follow up information about today's clinic visit or your schedule please contact [facility] directly at [phone].  Normal or non-critical lab and imaging results will be communicated to you by MyChart, letter or phone within 4 business days after the clinic has received the results. If you do not hear from us within 7 days, please contact the clinic through MyChart or phone. If you have a critical or abnormal lab result, we will notify you by phone as soon as possible.  Submit refill requests through [employer] or call your pharmacy and they will forward the refill request to us. Please allow 3 business days for your refill to be completed.          Additional Information About Your Visit        Care EveryWhere ID     This is your Care EveryWhere ID. This could be used by other organizations to access your [facility] medical records  [id number]         Blood Pressure from Last 3 Encounters:   08/28/18 110/72   08/15/18 121/79   07/17/18 137/85    Weight from Last 3 Encounters:   08/28/18 132 lb (59.9 kg)   08/15/18 132 lb 8 oz (60.1 kg)   07/17/18 112 lb (50.8 kg)              Today, you had the following     No orders found for display       Primary Care Provider Office Phone # Fax #     Apurva Jackson PA-C [phone]       [address] [facility] [address] [address]        Equal Access to Services     [place] : Hadelijah ayanna atkinson ruth Salomon, waaxda luqadaha, qaybta kaalmada chester, david waldropceleste . So [facility] [phone].    ATENCIÓN: Si habla español, tiene a delatorre disposición servicios gratuitos de asistencia lingüística. Llame al [phone].    We comply with applicable federal civil rights laws and [place] laws. We do not discriminate on the basis of race, color, national origin, age, disability, sex, sexual orientation, or gender identity.            Thank you!     Thank you for choosing [facility]  for your care. Our goal is always to provide you with excellent care. Hearing back from our patients is one way we can continue to improve our services. Please take a few minutes to complete the written survey that you may receive in the mail after your visit with us. Thank you!             Your Updated Medication List - Protect others around you: Learn how to safely use, store and throw away your medicines at www.disposemymeds.org.          This list is accurate as of 9/11/18  2:27 PM.  Always use your most recent med list.                   Brand Name Dispense Instructions for use Diagnosis    baclofen 10 MG tablet    LIORESAL     Take 3 tablets by mouth 4 times daily        CIPROFLOXACIN PO           CITRACAL + D PO           diazepam 5 MG tablet    VALIUM     Take 5 mg by mouth 3 times daily as needed        * HYDROcodone-acetaminophen 5-500 MG per tablet    VICODIN     Take 1 tablet by mouth every 6 hours as needed For pain. Max acetaminophen dose: 4000 mg in 24hrs.        * HYDROcodone-acetaminophen 5-325 MG per tablet    NORCO    20 tablet    Take 1-2 tablets by mouth every 4 hours as needed for other (Moderate to Severe Pain)    S/P lumpectomy, left breast       imipramine 10 MG tablet    TOFRANIL     Take  10 mg by mouth At Bedtime        pramipexole 0.25 MG tablet    MIRAPEX     Take 0.125 mg by mouth At Bedtime Take 2-3 hours before bedtime        traMADol 50 MG tablet    ULTRAM     Take 50 mg by mouth 2 times daily        VITAMIN B12 PO      Take 100 mcg by mouth        VITAMIN D PO      Take 1,500 Units by mouth        * Notice:  This list has 2 medication(s) that are the same as other medications prescribed for you. Read the directions carefully, and ask your doctor or other care provider to review them with you.

## 2018-09-11 NOTE — PATIENT INSTRUCTIONS
You are scheduled for the following appointments:   1) Dr. Katherine Mckeon at [facility] 9/13/18 at 1:40pm   2) Dr. Moe Salazar at [facility] Radiation Therapy [place] on 9/14/18 at 9am

## 2018-09-13 ENCOUNTER — TRANSFERRED RECORDS (OUTPATIENT)
Dept: HEALTH INFORMATION MANAGEMENT | Facility: CLINIC | Age: 68
End: 2018-09-13

## 2018-09-26 ENCOUNTER — TELEPHONE (OUTPATIENT)
Dept: SURGERY | Facility: CLINIC | Age: 68
End: 2018-09-26

## 2018-09-26 NOTE — TELEPHONE ENCOUNTER
Name of caller: caregiver, Ingris from [facility]    Reason for Call:  Looking for results    Surgeon:  Dr. Boucher    Recent Surgery:  Yes.    If yes, when & what type:  Left breast lumpectomy 08/15/18      Best phone number to reach pt at is [phone]  Ok to leave a message with medical info? Yes.    Pharmacy preferred (if calling for a refill): na

## 2018-09-26 NOTE — TELEPHONE ENCOUNTER
Oncotype results faxed to [phone] for upcoming appointment with Dr. Mckeon.    Ashley Infante BSN, RN, OCN  Oncology Care Coordinator  [facility]  [phone]

## 2018-11-13 ENCOUNTER — TRANSFERRED RECORDS (OUTPATIENT)
Dept: HEALTH INFORMATION MANAGEMENT | Facility: CLINIC | Age: 68
End: 2018-11-13

## 2018-12-20 ENCOUNTER — TRANSFERRED RECORDS (OUTPATIENT)
Dept: HEALTH INFORMATION MANAGEMENT | Facility: CLINIC | Age: 68
End: 2018-12-20

## 2019-02-12 ENCOUNTER — TRANSFERRED RECORDS (OUTPATIENT)
Dept: HEALTH INFORMATION MANAGEMENT | Facility: CLINIC | Age: 69
End: 2019-02-12

## 2019-07-02 ENCOUNTER — OFFICE VISIT (OUTPATIENT)
Dept: URGENT CARE | Facility: URGENT CARE | Age: 69
End: 2019-07-02
Payer: COMMERCIAL

## 2019-07-02 VITALS
SYSTOLIC BLOOD PRESSURE: 121 MMHG | OXYGEN SATURATION: 97 % | WEIGHT: 140 LBS | DIASTOLIC BLOOD PRESSURE: 69 MMHG | HEART RATE: 85 BPM | TEMPERATURE: 100.2 F | BODY MASS INDEX: 25.61 KG/M2

## 2019-07-02 DIAGNOSIS — Z53.9 DIAGNOSIS NOT YET DEFINED: Primary | ICD-10-CM

## 2019-07-11 ENCOUNTER — HOSPITAL ENCOUNTER (OUTPATIENT)
Dept: MAMMOGRAPHY | Facility: CLINIC | Age: 69
Discharge: HOME | End: 2019-07-11
Attending: INTERNAL MEDICINE | Admitting: INTERNAL MEDICINE
Payer: COMMERCIAL

## 2019-07-11 DIAGNOSIS — Z12.31 VISIT FOR SCREENING MAMMOGRAM: ICD-10-CM

## 2019-07-11 PROCEDURE — 77063 BREAST TOMOSYNTHESIS BI: CPT

## 2019-07-24 ENCOUNTER — TRANSFERRED RECORDS (OUTPATIENT)
Dept: HEALTH INFORMATION MANAGEMENT | Facility: CLINIC | Age: 69
End: 2019-07-24

## 2019-09-30 ENCOUNTER — TRANSFERRED RECORDS (OUTPATIENT)
Dept: HEALTH INFORMATION MANAGEMENT | Facility: CLINIC | Age: 69
End: 2019-09-30

## 2020-01-15 ENCOUNTER — HOSPITAL ENCOUNTER (OUTPATIENT)
Dept: MRI IMAGING | Facility: CLINIC | Age: 70
End: 2020-01-15
Attending: PSYCHIATRY & NEUROLOGY
Payer: COMMERCIAL

## 2020-01-15 ENCOUNTER — HOSPITAL ENCOUNTER (OUTPATIENT)
Dept: MRI IMAGING | Facility: CLINIC | Age: 70
Discharge: HOME | End: 2020-01-15
Attending: PSYCHIATRY & NEUROLOGY | Admitting: PSYCHIATRY & NEUROLOGY
Payer: COMMERCIAL

## 2020-01-15 DIAGNOSIS — G35 MULTIPLE SCLEROSIS (H): ICD-10-CM

## 2020-01-15 DIAGNOSIS — M62.81 MUSCLE WEAKNESS (GENERALIZED): ICD-10-CM

## 2020-01-15 DIAGNOSIS — E55.9 VITAMIN D DEFICIENCY DISEASE: ICD-10-CM

## 2020-01-15 DIAGNOSIS — M62.838 SPASM OF MUSCLE: ICD-10-CM

## 2020-01-15 PROCEDURE — A9585 GADOBUTROL INJECTION: HCPCS | Performed by: PSYCHIATRY & NEUROLOGY

## 2020-01-15 PROCEDURE — 72157 MRI CHEST SPINE W/O & W/DYE: CPT

## 2020-01-15 PROCEDURE — 70553 MRI BRAIN STEM W/O & W/DYE: CPT

## 2020-01-15 PROCEDURE — 25500064 ZZH RX 255 OP 636: Performed by: PSYCHIATRY & NEUROLOGY

## 2020-01-15 PROCEDURE — 72156 MRI NECK SPINE W/O & W/DYE: CPT

## 2020-01-15 RX ORDER — GADOBUTROL 604.72 MG/ML
6 INJECTION INTRAVENOUS ONCE
Status: COMPLETED | OUTPATIENT
Start: 2020-01-15 | End: 2020-01-15

## 2020-01-15 RX ADMIN — GADOBUTROL 6 ML: 604.72 INJECTION INTRAVENOUS at 16:50

## 2020-01-29 ENCOUNTER — HOSPITAL ENCOUNTER (OUTPATIENT)
Dept: MAMMOGRAPHY | Facility: CLINIC | Age: 70
Discharge: HOME | End: 2020-01-29
Attending: INTERNAL MEDICINE | Admitting: INTERNAL MEDICINE
Payer: COMMERCIAL

## 2020-01-29 DIAGNOSIS — Z12.31 VISIT FOR SCREENING MAMMOGRAM: ICD-10-CM

## 2020-01-29 PROCEDURE — 77063 BREAST TOMOSYNTHESIS BI: CPT

## 2020-08-16 ENCOUNTER — APPOINTMENT (OUTPATIENT)
Dept: CT IMAGING | Facility: CLINIC | Age: 70
DRG: 689 | End: 2020-08-16
Attending: EMERGENCY MEDICINE
Payer: COMMERCIAL

## 2020-08-16 ENCOUNTER — APPOINTMENT (OUTPATIENT)
Dept: GENERAL RADIOLOGY | Facility: CLINIC | Age: 70
DRG: 689 | End: 2020-08-16
Attending: EMERGENCY MEDICINE
Payer: COMMERCIAL

## 2020-08-16 ENCOUNTER — HOSPITAL ENCOUNTER (INPATIENT)
Facility: CLINIC | Age: 70
LOS: 3 days | Discharge: HOME HEALTH | DRG: 689 | End: 2020-08-20
Attending: EMERGENCY MEDICINE | Admitting: HOSPITALIST
Payer: COMMERCIAL

## 2020-08-16 DIAGNOSIS — N39.0 URINARY TRACT INFECTION WITHOUT HEMATURIA, SITE UNSPECIFIED: ICD-10-CM

## 2020-08-16 DIAGNOSIS — S43.014A ANTERIOR SHOULDER DISLOCATION, RIGHT, INITIAL ENCOUNTER: ICD-10-CM

## 2020-08-16 DIAGNOSIS — G35 MULTIPLE SCLEROSIS (H): ICD-10-CM

## 2020-08-16 DIAGNOSIS — G93.40 ENCEPHALOPATHY: ICD-10-CM

## 2020-08-16 DIAGNOSIS — G93.41 SEPTIC ENCEPHALOPATHY: Primary | ICD-10-CM

## 2020-08-16 LAB
ALBUMIN SERPL-MCNC: 3.8 G/DL (ref 3.4–5)
ALBUMIN UR-MCNC: 30 MG/DL
ALP SERPL-CCNC: 80 U/L (ref 40–150)
ALT SERPL W P-5'-P-CCNC: 54 U/L (ref 0–50)
ANION GAP SERPL CALCULATED.3IONS-SCNC: 6 MMOL/L (ref 3–14)
APPEARANCE UR: ABNORMAL
AST SERPL W P-5'-P-CCNC: 117 U/L (ref 0–45)
BASOPHILS # BLD AUTO: 0 10E9/L (ref 0–0.2)
BASOPHILS NFR BLD AUTO: 0.3 %
BILIRUB SERPL-MCNC: 0.5 MG/DL (ref 0.2–1.3)
BILIRUB UR QL STRIP: NEGATIVE
BUN SERPL-MCNC: 16 MG/DL (ref 7–30)
CALCIUM SERPL-MCNC: 9 MG/DL (ref 8.5–10.1)
CHLORIDE SERPL-SCNC: 100 MMOL/L (ref 94–109)
CO2 SERPL-SCNC: 28 MMOL/L (ref 20–32)
COLOR UR AUTO: YELLOW
CREAT SERPL-MCNC: 1.06 MG/DL (ref 0.52–1.04)
DIFFERENTIAL METHOD BLD: NORMAL
EOSINOPHIL # BLD AUTO: 0 10E9/L (ref 0–0.7)
EOSINOPHIL NFR BLD AUTO: 0.4 %
ERYTHROCYTE [DISTWIDTH] IN BLOOD BY AUTOMATED COUNT: 13.3 % (ref 10–15)
ETHANOL SERPL-MCNC: <0.01 G/DL
GFR SERPL CREATININE-BSD FRML MDRD: 53 ML/MIN/{1.73_M2}
GLUCOSE SERPL-MCNC: 161 MG/DL (ref 70–99)
GLUCOSE UR STRIP-MCNC: NEGATIVE MG/DL
HCT VFR BLD AUTO: 38.5 % (ref 35–47)
HGB BLD-MCNC: 12.5 G/DL (ref 11.7–15.7)
HGB UR QL STRIP: ABNORMAL
IMM GRANULOCYTES # BLD: 0 10E9/L (ref 0–0.4)
IMM GRANULOCYTES NFR BLD: 0.4 %
INTERPRETATION ECG - MUSE: NORMAL
KETONES UR STRIP-MCNC: NEGATIVE MG/DL
LACTATE BLD-SCNC: 1.5 MMOL/L (ref 0.7–2)
LEUKOCYTE ESTERASE UR QL STRIP: ABNORMAL
LYMPHOCYTES # BLD AUTO: 1 10E9/L (ref 0.8–5.3)
LYMPHOCYTES NFR BLD AUTO: 11.5 %
MAGNESIUM SERPL-MCNC: 1.9 MG/DL (ref 1.6–2.3)
MCH RBC QN AUTO: 31.2 PG (ref 26.5–33)
MCHC RBC AUTO-ENTMCNC: 32.5 G/DL (ref 31.5–36.5)
MCV RBC AUTO: 96 FL (ref 78–100)
MONOCYTES # BLD AUTO: 0.7 10E9/L (ref 0–1.3)
MONOCYTES NFR BLD AUTO: 8.1 %
NEUTROPHILS # BLD AUTO: 7.1 10E9/L (ref 1.6–8.3)
NEUTROPHILS NFR BLD AUTO: 79.3 %
NITRATE UR QL: NEGATIVE
PH UR STRIP: 5.5 PH (ref 5–7)
PLATELET # BLD AUTO: 326 10E9/L (ref 150–450)
POTASSIUM SERPL-SCNC: 3.9 MMOL/L (ref 3.4–5.3)
PROT SERPL-MCNC: 7.5 G/DL (ref 6.8–8.8)
RBC # BLD AUTO: 4.01 10E12/L (ref 3.8–5.2)
RBC #/AREA URNS AUTO: 106 /HPF (ref 0–2)
SODIUM SERPL-SCNC: 134 MMOL/L (ref 133–144)
SOURCE: ABNORMAL
SP GR UR STRIP: 1.01 (ref 1–1.03)
SQUAMOUS #/AREA URNS AUTO: 3 /HPF (ref 0–1)
TROPONIN I SERPL-MCNC: <0.015 UG/L (ref 0–0.04)
UROBILINOGEN UR STRIP-MCNC: NORMAL MG/DL (ref 0–2)
WBC # BLD AUTO: 8.9 10E9/L (ref 4–11)
WBC #/AREA URNS AUTO: >182 /HPF (ref 0–5)
WBC CLUMPS #/AREA URNS HPF: PRESENT /HPF

## 2020-08-16 PROCEDURE — 85025 COMPLETE CBC W/AUTO DIFF WBC: CPT | Performed by: EMERGENCY MEDICINE

## 2020-08-16 PROCEDURE — 83605 ASSAY OF LACTIC ACID: CPT | Performed by: EMERGENCY MEDICINE

## 2020-08-16 PROCEDURE — 93005 ELECTROCARDIOGRAM TRACING: CPT

## 2020-08-16 PROCEDURE — 84484 ASSAY OF TROPONIN QUANT: CPT | Performed by: EMERGENCY MEDICINE

## 2020-08-16 PROCEDURE — 87086 URINE CULTURE/COLONY COUNT: CPT | Performed by: EMERGENCY MEDICINE

## 2020-08-16 PROCEDURE — U0003 INFECTIOUS AGENT DETECTION BY NUCLEIC ACID (DNA OR RNA); SEVERE ACUTE RESPIRATORY SYNDROME CORONAVIRUS 2 (SARS-COV-2) (CORONAVIRUS DISEASE [COVID-19]), AMPLIFIED PROBE TECHNIQUE, MAKING USE OF HIGH THROUGHPUT TECHNOLOGIES AS DESCRIBED BY CMS-2020-01-R: HCPCS | Performed by: EMERGENCY MEDICINE

## 2020-08-16 PROCEDURE — 80320 DRUG SCREEN QUANTALCOHOLS: CPT | Performed by: EMERGENCY MEDICINE

## 2020-08-16 PROCEDURE — 96361 HYDRATE IV INFUSION ADD-ON: CPT

## 2020-08-16 PROCEDURE — 87088 URINE BACTERIA CULTURE: CPT | Performed by: EMERGENCY MEDICINE

## 2020-08-16 PROCEDURE — 70450 CT HEAD/BRAIN W/O DYE: CPT

## 2020-08-16 PROCEDURE — C9803 HOPD COVID-19 SPEC COLLECT: HCPCS

## 2020-08-16 PROCEDURE — 83735 ASSAY OF MAGNESIUM: CPT | Performed by: EMERGENCY MEDICINE

## 2020-08-16 PROCEDURE — 87186 SC STD MICRODIL/AGAR DIL: CPT | Performed by: EMERGENCY MEDICINE

## 2020-08-16 PROCEDURE — 80053 COMPREHEN METABOLIC PANEL: CPT | Performed by: EMERGENCY MEDICINE

## 2020-08-16 PROCEDURE — 25800030 ZZH RX IP 258 OP 636: Performed by: EMERGENCY MEDICINE

## 2020-08-16 PROCEDURE — 81001 URINALYSIS AUTO W/SCOPE: CPT | Performed by: EMERGENCY MEDICINE

## 2020-08-16 PROCEDURE — 99285 EMERGENCY DEPT VISIT HI MDM: CPT | Mod: 25

## 2020-08-16 PROCEDURE — 71045 X-RAY EXAM CHEST 1 VIEW: CPT

## 2020-08-16 RX ORDER — SODIUM CHLORIDE 9 MG/ML
INJECTION, SOLUTION INTRAVENOUS CONTINUOUS
Status: DISCONTINUED | OUTPATIENT
Start: 2020-08-17 | End: 2020-08-17

## 2020-08-16 RX ORDER — CEFTRIAXONE 1 G/1
1 INJECTION, POWDER, FOR SOLUTION INTRAMUSCULAR; INTRAVENOUS ONCE
Status: COMPLETED | OUTPATIENT
Start: 2020-08-16 | End: 2020-08-17

## 2020-08-16 RX ADMIN — SODIUM CHLORIDE 1000 ML: 9 INJECTION, SOLUTION INTRAVENOUS at 23:17

## 2020-08-16 ASSESSMENT — ENCOUNTER SYMPTOMS
CONFUSION: 1
COUGH: 1
FEVER: 0
DIARRHEA: 0
VOMITING: 0
WHEEZING: 0

## 2020-08-16 ASSESSMENT — MIFFLIN-ST. JEOR: SCORE: 1123.25

## 2020-08-17 PROBLEM — G93.41 SEPTIC ENCEPHALOPATHY: Status: ACTIVE | Noted: 2020-08-17

## 2020-08-17 LAB
ALBUMIN SERPL-MCNC: 3.3 G/DL (ref 3.4–5)
ALP SERPL-CCNC: 72 U/L (ref 40–150)
ALT SERPL W P-5'-P-CCNC: 41 U/L (ref 0–50)
AMMONIA PLAS-SCNC: <10 UMOL/L (ref 10–50)
ANION GAP SERPL CALCULATED.3IONS-SCNC: 4 MMOL/L (ref 3–14)
AST SERPL W P-5'-P-CCNC: 50 U/L (ref 0–45)
BASE EXCESS BLDV CALC-SCNC: 4.1 MMOL/L
BASOPHILS # BLD AUTO: 0 10E9/L (ref 0–0.2)
BASOPHILS NFR BLD AUTO: 0.1 %
BILIRUB SERPL-MCNC: 0.3 MG/DL (ref 0.2–1.3)
BUN SERPL-MCNC: 15 MG/DL (ref 7–30)
CALCIUM SERPL-MCNC: 8.3 MG/DL (ref 8.5–10.1)
CHLORIDE SERPL-SCNC: 105 MMOL/L (ref 94–109)
CO2 SERPL-SCNC: 29 MMOL/L (ref 20–32)
CREAT SERPL-MCNC: 0.68 MG/DL (ref 0.52–1.04)
CREAT SERPL-MCNC: 0.78 MG/DL (ref 0.52–1.04)
DIFFERENTIAL METHOD BLD: ABNORMAL
EOSINOPHIL # BLD AUTO: 0 10E9/L (ref 0–0.7)
EOSINOPHIL NFR BLD AUTO: 0.4 %
ERYTHROCYTE [DISTWIDTH] IN BLOOD BY AUTOMATED COUNT: 13.7 % (ref 10–15)
GFR SERPL CREATININE-BSD FRML MDRD: 77 ML/MIN/{1.73_M2}
GFR SERPL CREATININE-BSD FRML MDRD: 89 ML/MIN/{1.73_M2}
GLUCOSE SERPL-MCNC: 107 MG/DL (ref 70–99)
HCO3 BLDV-SCNC: 31 MMOL/L (ref 21–28)
HCT VFR BLD AUTO: 35.1 % (ref 35–47)
HGB BLD-MCNC: 11.5 G/DL (ref 11.7–15.7)
IMM GRANULOCYTES # BLD: 0 10E9/L (ref 0–0.4)
IMM GRANULOCYTES NFR BLD: 0.1 %
LYMPHOCYTES # BLD AUTO: 0.7 10E9/L (ref 0.8–5.3)
LYMPHOCYTES NFR BLD AUTO: 10.7 %
MCH RBC QN AUTO: 31.6 PG (ref 26.5–33)
MCHC RBC AUTO-ENTMCNC: 32.8 G/DL (ref 31.5–36.5)
MCV RBC AUTO: 96 FL (ref 78–100)
MONOCYTES # BLD AUTO: 0.7 10E9/L (ref 0–1.3)
MONOCYTES NFR BLD AUTO: 10.4 %
NEUTROPHILS # BLD AUTO: 5.3 10E9/L (ref 1.6–8.3)
NEUTROPHILS NFR BLD AUTO: 78.3 %
O2/TOTAL GAS SETTING VFR VENT: ABNORMAL %
OXYHGB MFR BLDV: 47 %
PCO2 BLDV: 54 MM HG (ref 40–50)
PH BLDV: 7.36 PH (ref 7.32–7.43)
PLATELET # BLD AUTO: 239 10E9/L (ref 150–450)
PLATELET # BLD AUTO: 264 10E9/L (ref 150–450)
PO2 BLDV: 27 MM HG (ref 25–47)
POTASSIUM SERPL-SCNC: 3.8 MMOL/L (ref 3.4–5.3)
PROT SERPL-MCNC: 6.6 G/DL (ref 6.8–8.8)
RBC # BLD AUTO: 3.64 10E12/L (ref 3.8–5.2)
SARS-COV-2 RNA SPEC QL NAA+PROBE: NOT DETECTED
SODIUM SERPL-SCNC: 138 MMOL/L (ref 133–144)
SPECIMEN SOURCE: NORMAL
WBC # BLD AUTO: 6.7 10E9/L (ref 4–11)

## 2020-08-17 PROCEDURE — 25000128 H RX IP 250 OP 636: Performed by: HOSPITALIST

## 2020-08-17 PROCEDURE — 82805 BLOOD GASES W/O2 SATURATION: CPT | Performed by: HOSPITALIST

## 2020-08-17 PROCEDURE — 36415 COLL VENOUS BLD VENIPUNCTURE: CPT | Performed by: PHYSICIAN ASSISTANT

## 2020-08-17 PROCEDURE — 25000128 H RX IP 250 OP 636: Performed by: EMERGENCY MEDICINE

## 2020-08-17 PROCEDURE — 12000000 ZZH R&B MED SURG/OB

## 2020-08-17 PROCEDURE — 80053 COMPREHEN METABOLIC PANEL: CPT | Performed by: HOSPITALIST

## 2020-08-17 PROCEDURE — 82140 ASSAY OF AMMONIA: CPT | Performed by: HOSPITALIST

## 2020-08-17 PROCEDURE — 82565 ASSAY OF CREATININE: CPT | Performed by: PHYSICIAN ASSISTANT

## 2020-08-17 PROCEDURE — 25000132 ZZH RX MED GY IP 250 OP 250 PS 637: Performed by: INTERNAL MEDICINE

## 2020-08-17 PROCEDURE — 25800030 ZZH RX IP 258 OP 636: Performed by: HOSPITALIST

## 2020-08-17 PROCEDURE — 85025 COMPLETE CBC W/AUTO DIFF WBC: CPT | Performed by: HOSPITALIST

## 2020-08-17 PROCEDURE — G0463 HOSPITAL OUTPT CLINIC VISIT: HCPCS

## 2020-08-17 PROCEDURE — 25000128 H RX IP 250 OP 636: Performed by: PHYSICIAN ASSISTANT

## 2020-08-17 PROCEDURE — 36415 COLL VENOUS BLD VENIPUNCTURE: CPT | Performed by: HOSPITALIST

## 2020-08-17 PROCEDURE — 99223 1ST HOSP IP/OBS HIGH 75: CPT | Mod: AI | Performed by: HOSPITALIST

## 2020-08-17 PROCEDURE — 85049 AUTOMATED PLATELET COUNT: CPT | Performed by: PHYSICIAN ASSISTANT

## 2020-08-17 PROCEDURE — 25000132 ZZH RX MED GY IP 250 OP 250 PS 637: Performed by: HOSPITALIST

## 2020-08-17 PROCEDURE — 96374 THER/PROPH/DIAG INJ IV PUSH: CPT

## 2020-08-17 RX ORDER — LANOLIN ALCOHOL/MO/W.PET/CERES
3 CREAM (GRAM) TOPICAL
Status: DISCONTINUED | OUTPATIENT
Start: 2020-08-17 | End: 2020-08-20 | Stop reason: HOSPADM

## 2020-08-17 RX ORDER — LANOLIN ALCOHOL/MO/W.PET/CERES
1000 CREAM (GRAM) TOPICAL DAILY
COMMUNITY

## 2020-08-17 RX ORDER — CEFTRIAXONE 2 G/1
2 INJECTION, POWDER, FOR SOLUTION INTRAMUSCULAR; INTRAVENOUS EVERY 24 HOURS
Status: DISCONTINUED | OUTPATIENT
Start: 2020-08-17 | End: 2020-08-19

## 2020-08-17 RX ORDER — AMOXICILLIN 250 MG
2 CAPSULE ORAL 2 TIMES DAILY PRN
Status: DISCONTINUED | OUTPATIENT
Start: 2020-08-17 | End: 2020-08-20 | Stop reason: HOSPADM

## 2020-08-17 RX ORDER — SODIUM CHLORIDE, SODIUM LACTATE, POTASSIUM CHLORIDE, CALCIUM CHLORIDE 600; 310; 30; 20 MG/100ML; MG/100ML; MG/100ML; MG/100ML
INJECTION, SOLUTION INTRAVENOUS CONTINUOUS
Status: DISCONTINUED | OUTPATIENT
Start: 2020-08-17 | End: 2020-08-19

## 2020-08-17 RX ORDER — ONDANSETRON 4 MG/1
4 TABLET, ORALLY DISINTEGRATING ORAL EVERY 6 HOURS PRN
Status: DISCONTINUED | OUTPATIENT
Start: 2020-08-17 | End: 2020-08-20 | Stop reason: HOSPADM

## 2020-08-17 RX ORDER — NALOXONE HYDROCHLORIDE 0.4 MG/ML
.1-.4 INJECTION, SOLUTION INTRAMUSCULAR; INTRAVENOUS; SUBCUTANEOUS
Status: DISCONTINUED | OUTPATIENT
Start: 2020-08-17 | End: 2020-08-20 | Stop reason: HOSPADM

## 2020-08-17 RX ORDER — ACETAMINOPHEN 650 MG/1
650 SUPPOSITORY RECTAL EVERY 4 HOURS PRN
Status: DISCONTINUED | OUTPATIENT
Start: 2020-08-17 | End: 2020-08-20 | Stop reason: HOSPADM

## 2020-08-17 RX ORDER — POLYETHYLENE GLYCOL 3350 17 G/17G
17 POWDER, FOR SOLUTION ORAL DAILY PRN
Status: DISCONTINUED | OUTPATIENT
Start: 2020-08-17 | End: 2020-08-20 | Stop reason: HOSPADM

## 2020-08-17 RX ORDER — ANASTROZOLE 1 MG/1
1 TABLET ORAL DAILY
COMMUNITY

## 2020-08-17 RX ORDER — IBUPROFEN 400 MG/1
400 TABLET, FILM COATED ORAL EVERY 6 HOURS PRN
Status: DISCONTINUED | OUTPATIENT
Start: 2020-08-17 | End: 2020-08-20 | Stop reason: HOSPADM

## 2020-08-17 RX ORDER — SERTRALINE HYDROCHLORIDE 25 MG/1
50 TABLET, FILM COATED ORAL DAILY
COMMUNITY
End: 2023-01-17

## 2020-08-17 RX ORDER — BISACODYL 10 MG
10 SUPPOSITORY, RECTAL RECTAL DAILY PRN
Status: DISCONTINUED | OUTPATIENT
Start: 2020-08-17 | End: 2020-08-20 | Stop reason: HOSPADM

## 2020-08-17 RX ORDER — LIDOCAINE 40 MG/G
CREAM TOPICAL
Status: DISCONTINUED | OUTPATIENT
Start: 2020-08-17 | End: 2020-08-20 | Stop reason: HOSPADM

## 2020-08-17 RX ORDER — ACETAMINOPHEN 325 MG/1
650 TABLET ORAL EVERY 4 HOURS PRN
Status: DISCONTINUED | OUTPATIENT
Start: 2020-08-17 | End: 2020-08-20 | Stop reason: HOSPADM

## 2020-08-17 RX ORDER — CALCIUM POLYCARBOPHIL 625 MG 625 MG/1
1 TABLET ORAL 2 TIMES DAILY
Status: ON HOLD | COMMUNITY
End: 2021-03-15

## 2020-08-17 RX ORDER — AMOXICILLIN 250 MG
1 CAPSULE ORAL 2 TIMES DAILY PRN
Status: DISCONTINUED | OUTPATIENT
Start: 2020-08-17 | End: 2020-08-20 | Stop reason: HOSPADM

## 2020-08-17 RX ORDER — ONDANSETRON 2 MG/ML
4 INJECTION INTRAMUSCULAR; INTRAVENOUS EVERY 6 HOURS PRN
Status: DISCONTINUED | OUTPATIENT
Start: 2020-08-17 | End: 2020-08-20 | Stop reason: HOSPADM

## 2020-08-17 RX ADMIN — SODIUM CHLORIDE, POTASSIUM CHLORIDE, SODIUM LACTATE AND CALCIUM CHLORIDE: 600; 310; 30; 20 INJECTION, SOLUTION INTRAVENOUS at 02:49

## 2020-08-17 RX ADMIN — ACETAMINOPHEN 650 MG: 325 TABLET, FILM COATED ORAL at 16:20

## 2020-08-17 RX ADMIN — CEFTRIAXONE SODIUM 1 G: 1 INJECTION, POWDER, FOR SOLUTION INTRAMUSCULAR; INTRAVENOUS at 00:03

## 2020-08-17 RX ADMIN — MICONAZOLE NITRATE: 20 POWDER TOPICAL at 16:21

## 2020-08-17 RX ADMIN — SODIUM CHLORIDE, POTASSIUM CHLORIDE, SODIUM LACTATE AND CALCIUM CHLORIDE: 600; 310; 30; 20 INJECTION, SOLUTION INTRAVENOUS at 12:04

## 2020-08-17 RX ADMIN — ACETAMINOPHEN 650 MG: 325 TABLET, FILM COATED ORAL at 09:58

## 2020-08-17 RX ADMIN — ACETAMINOPHEN 650 MG: 325 TABLET, FILM COATED ORAL at 20:47

## 2020-08-17 RX ADMIN — ENOXAPARIN SODIUM 40 MG: 40 INJECTION SUBCUTANEOUS at 17:30

## 2020-08-17 RX ADMIN — CEFTRIAXONE SODIUM 2 G: 2 INJECTION, POWDER, FOR SOLUTION INTRAMUSCULAR; INTRAVENOUS at 12:40

## 2020-08-17 ASSESSMENT — ACTIVITIES OF DAILY LIVING (ADL)
ADLS_ACUITY_SCORE: 33
ADLS_ACUITY_SCORE: 35
ADLS_ACUITY_SCORE: 37
ADLS_ACUITY_SCORE: 33
ADLS_ACUITY_SCORE: 35

## 2020-08-17 NOTE — PROGRESS NOTES
[facility]    Medicine History and Physical - Hospitalist Service       Date of Admission:  8/16/2020    Assessment & Plan   Mariela Allen is a 70 year old female admitted on 8/16/2020. PMHx MS with paraplegia, spasticity, and neurogenic bladder who presented with altered mental status.    Acute encephalopathy.  Admitted with hypotension (reportedly in 70s with EMS and improved prior to ED arrival), somnolence, and confusion. Possible UTI and/or polypharmacy. UA showing pyuria. Also on several sedating medications and reports taking 2 [place] because she ran out of Tramadol. Recalls having 1 beer on day of adm. CXR and head CT without acute pathology.  - Hold PTA meds; OK to restart Baclofen if painful/severe spasticity overnight - for now holding while mentation clearing and pain spasticity controlled.  - Follow urine ctx.  - Cont empiric ceftriaxone, initiated 8/16.  - Regular diet.  - Discontinue Q4H neuro checks.  - PT/OT/Care transition consults appreciated.    Chronic MS with paraplegia, neurogenic bladder.  Wheelchair bound and home care in twice daily in AM and PM to assist with daily cares.  - Holding PTA Baclofen, Valium, Norco, Tramadol, imipramine, Mirapex.    NGUYEN.  Cr 1.06 (0.59 in 9/2019). Likely prerenal related to hypotension on presentation. Possibly hypovolemic.  - Saline lock LR IVF.     Transaminitis. Mild and improving. Cause unclear, possibly related to hypotension on adm. NH3 undetectable.     Stage 2 sacral decubitus ulcers. Present on admission: WOC consulted    Depression. Zoloft on hold.    BRITTANY. Not compliant with CPAP.    BCa s/p lumpectomy and radiation (2018). Resume PTA Arimidex on discharge.    Diet: Regular Diet Adult    [place] Catheter: not present    DVT Prophylaxis: Enoxaparin (Lovenox) SQ  Code Status: Full Code - mentation improved, confirms full code    Expected discharge: 2 days, recommended to prior living arrangements vs tcu once adequate pain management/  tolerating PO medications, antibiotic plan established and safe disposition plan/ TCU bed available.    The patient's care was discussed with the Attending Physician, Dr. Nieves, Bedside Nurse and Patient.    JoAnna K. Barthell, PA-C  Hospitalist Service  [facility]    ______________________________________________________________________    Interval History   Mentation improving, but still feels foggy. Had auditory hallucinations today; usually occurs only at night when wakes up and only every couple days. Recalls taking two Norco yesterday because she ran out of Tramadol and also had 1 beer. No urinary symptoms (neurogenic bladder), no fever, back pain. Tolerating breakfast.    Data reviewed today: I reviewed all medications, new labs and imaging results over the last 24 hours. I personally reviewed no images or EKG's today.    Physical Exam   Vital Signs: Temp: 99.3  F (37.4  C) Temp src: Oral BP: (!) 94/38 Pulse: 69 Heart Rate: 81 Resp: 16 SpO2: 94 % O2 Device: None (Room air) Oxygen Delivery: 1 LPM  Weight: 143 lbs 4.78 oz  Constitutional: Appears stated age, no acute distress. Alert and oriented to hospital, reason for hospitalization, month/year, COVID19 name. Answers questions appropriately.  Respiratory: Breath sounds CTA. No increased work of breathing.  Cardiovascular: RRR, no rub or murmur. No peripheral edema.  GI: Soft, non-tender, non-distended.  Skin: Warm, dry, no rashes or lesions.  MSK: Diffuse muscle atrophy. All four extremities with contractures.    Medications     lactated ringers 125 mL/hr at 08/17/20 1204       cefTRIAXone  2 g Intravenous Q24H     sodium chloride (PF)  3 mL Intracatheter Q8H       Data   Recent Labs   Lab 08/17/20  0651 08/16/20  2306   WBC 6.7 8.9   HGB 11.5* 12.5   MCV 96 96    326    134   POTASSIUM 3.8 3.9   CHLORIDE 105 100   CO2 29 28   BUN 15 16   CR 0.78 1.06*   ANIONGAP 4 6   KIMBER 8.3* 9.0   * 161*   ALBUMIN 3.3* 3.8    PROTTOTAL 6.6* 7.5   BILITOTAL 0.3 0.5   ALKPHOS 72 80   ALT 41 54*   AST 50* 117*   TROPI  --  <0.015       Imaging:  Recent Results (from the past 24 hour(s))   CT Head w/o Contrast    Narrative    EXAM: CT HEAD W/O CONTRAST  LOCATION: [facility]  DATE/TIME: 8/16/2020 11:41 PM    INDICATION: Acute mental status and hypotension. Somnolent.  COMPARISON: None.  TECHNIQUE: Routine without IV contrast. Multiplanar reformats. Dose reduction techniques were used.    FINDINGS:  INTRACRANIAL CONTENTS: No intracranial hemorrhage, extraaxial collection, or mass effect.  No CT evidence of acute infarct. Normal parenchymal attenuation. Mild generalized volume loss. No hydrocephalus.     VISUALIZED ORBITS/SINUSES/MASTOIDS: No intraorbital abnormality. No paranasal sinus mucosal disease. No middle ear or mastoid effusion.    BONES/SOFT TISSUES: No acute calvarial fracture. Focal scalp subcutaneous stranding at the midline vertex.      Impression    IMPRESSION:  1.  No evidence of an acute intracranial abnormality.  2.  No acute calvarial fracture. Focal scalp subcutaneous stranding at the midline vertex may reflect contusion or scarring. Correlate with physical exam findings.   XR Chest Port 1 View    Narrative    EXAM: XR CHEST PORT 1 VW  LOCATION: [facility]  DATE/TIME: 8/16/2020 11:49 PM    INDICATION: Altered mental status. Hypotension.  COMPARISON: None.      Impression    IMPRESSION: Shallow inspiration. Mild cardiac enlargement. No focal consolidation or pleural effusion.

## 2020-08-17 NOTE — UTILIZATION REVIEW
"  Admission Status; Secondary Review Determination         Under the authority of the Utilization Management Committee, the utilization review process indicated a secondary review on the above patient.  The review outcome is based on review of the medical records, discussions with staff, and applying clinical experience noted on the date of the review.        (x)      Inpatient Status Appropriate - This patient's medical care is consistent with medical management for inpatient care and reasonable inpatient medical practice.      () Observation Status Appropriate - This patient does not meet hospital inpatient criteria and is placed in observation status. If this patient's primary payer is Medicare and was admitted as an inpatient, Condition Code 44 should be used and patient status changed to \"observation\".   () Admission Status NOT Appropriate - This patient's medical care is not consistent with medical management for Inpatient or Observation Status.          RATIONALE FOR DETERMINATION     70 year old woman with past history that is most notable for MS causing tetraplegia and neurogenic bladder and well as chronic spastic and neuropathic pain; who presented with somnolence and hypotension and was found to have acute encephalopathy suspected due to UTI.  Her creatinine was elevated.  She has a mild elevation of her liver enzymes.  Cultures were obtained, and patient was placed on IV antibiotics.  She was placed on oxygen for O2 saturations as low as 90%.  She was given IV fluids.  She is appropriate for inpatient status.      The severity of illness, intensity of service provided, expected LOS and risk for adverse outcome make the care complex, high risk and appropriate for hospital admission.        The information on this document is developed by the utilization review team in order for the business office to ensure compliance.  This only denotes the appropriateness of proper admission status and does not reflect " the quality of care rendered.         The definitions of Inpatient Status and Observation Status used in making the determination above are those provided in the CMS Coverage Manual, Chapter 1 and Chapter 6, section 70.4.      Sincerely,     Bret Gonzalez MD  Physician Advisor  Utilization Review/ Case Management  [facility].

## 2020-08-17 NOTE — PLAN OF CARE
Admit from ED @ 0230, very somnolent on arrival. Eventually alert and oriented x3 after waking up. VSS on 1L NC ex soft BPs. Loud gurgling and noted secretions in throat, suctioned with younkers x1 for moderate amt thick white secretions. Denies pain, requested baclofen for muscle spasticity/rigidity but MD holding for now d/t concern over somnolence and mental status. Hx severe MS; muscles spastic with little voluntary control. Neuros q4 but difficult to assess d/t MS. Incontinent of bowel x1, purewick in place for urinary incontinence. Redness to bottom and pinpoint sore on coccyx, mepilex in place, WOC consulted, T/R q2h. L PIV infusing LR @ 125. UA positive, on rocephin. Lift patient but not in lift room, needs transfer once one becomes available.

## 2020-08-17 NOTE — H&P
[facility]    History and Physical  Hospitalist       Date of Admission:  8/16/2020  Date of Service (when I saw the patient): 08/17/20    ASSESSMENT  Mariela Allen is a 70 year old woman with past history that is most notable for MS causing tetraplegia and neurogenic bladder and well as chronic spastic and neuropathic pain; who presents with somnolence and hypotension and is found to have acute encephalopathy suspected due to UTI.    PLAN    1) Acute metabolic encephalopathy suspected due to UTI: Of note, she is followed by [facility] for severe MS, having been diagnosed in 1982. She has associated tetraplegia, wheelchair bound, and has chronic neuropathic pain for which she receives multiple medications, as well as neurogenic bladder, for which reportedly she receives Botox injections as an outpatient. She has had prior recurrent UTI infections though most recent culture that I can see is from 2016 and showed only normal urogenital reena. She reportedly had an episode of C difficile colitis in 2011. She now presents with acute hypotension, somnolence and confusion and is found to have pyuria on UA, concerning for UTI causing encephalopathy and possible early onset sepsis. Her symptoms could be exacerbated by concurrent self-administration at home of multiple sedating medications for pain, as well as by reported BRITTANY not being treated with CPAP.    -- Inpatient. NPO. Ceftriaxone continued empirically. Follow up urine cultures. Fall precautions and q 4 neuro checks.     -- Check VBG and monitor saturations using oxygen as needed while she sleeps    2) Chronic MS:    -- Hold her outpatient regimen of sedating medications (Baclofen, Valium, narcotics, among others) overnight    -- If her mental status does not soon improve would consider Neurology consultation for further evaluation    3) NGUYEN: Cr 1.06 (0.59 in 9/2019): Possibly hypovolemic.  ml/hour IV fluid.     4) Trans-aminitis:  "Mild. Cause unclear.     -- Check Ammonia level; monitor with repeat labs    5) Hypertension, dyslipidemia: Resume home medications when verified    6) Stage 2 sacral decubitus ulcers, present on admission: WOC consulted    Rule Out COVID-19 infection  This patient was evaluated during a global COVID-19 pandemic, which necessitated consideration that the patient might be at risk for infection with the SARS-CoV-2 virus that causes COVID-19. Applicable protocols for evaluation were followed during the patient's care. LOW suspicion for infection.   -follow-up COVID-19 PCR test result; no current indication for precautions    Chief Complaint   Confusion    Unable to obtain a history from the patient due to confusion; history obtained from the ED physician whom I have spoken with    History of Present Illness   Mariela Allen is a 70 year old woman who presents with confusion noticed tonight by a caregiver who came tonight to her home: confusion is characterized as asking inappropriate questions, constant since onset; EMS was called.     Initial BP was reported to be 70 systolic by EMS. FS was 165.    In our ED, she was very somnolent, able to deny any change in her chronic extremity weakness and spasticity, or extra or illicit medication or any ETOH use tonight, before falling asleep again. It has been reported that she lives with a  at home who is debilitated and she takes her own medications. Prior notes document that she is wheelchair bound. Currently she is somnolent but arousable and no other acute complaints are ascertainable.    In the ED, Blood pressure 113/53, pulse 70, temperature 98.4  F (36.9  C), temperature source Oral, resp. rate 16, height 1.575 m (5' 2\"), weight 65 kg (143 lb 4.8 oz), SpO2 91 %.    CBC and CMP were done and notable for BUN 16, Cr 1.06, ALT 54, , otherwise were within the normal reference range. Lactate was 1.5. Troponin negative. EKG showed NSR without ST segment " "changes. Ethanol level negative.     UA showed greater than 182 WBC and 106 RBC. Urine cultures were sent.    CXR showed: \"IMPRESSION: Shallow inspiration. Mild cardiac enlargement. No focal consolidation or pleural effusion\"    CT Head showed: \"IMPRESSION:  1.  No evidence of an acute intracranial abnormality.  2.  No acute calvarial fracture. Focal scalp subcutaneous stranding at the midline vertex may reflect contusion or scarring. Correlate with physical exam findings.\"    She was given IV fluid and Ceftriaxone in the ED.    PHYSICAL EXAM  Blood pressure 113/53, pulse 70, temperature 98.4  F (36.9  C), temperature source Oral, resp. rate 16, height 1.575 m (5' 2\"), weight 65 kg (143 lb 4.8 oz), SpO2 91 %.  Constitutional: Somnolent; no apparent distress  HEENT: normocephalic moist mucus membranes  Respiratory: lungs clear to auscultation bilaterally  Cardiovascular: regular S1 S2 no murmurs rubs or gallops  GI: abdomen soft non tender non distended bowel sounds positive  Lymph/Hematologic: no pallor, no cervical lymphadenopathy  Skin: no rash, good turgor  Musculoskeletal: no clubbing, cyanosis or edema; buttocks with non-tender erythema  Neurologic: extra-ocular muscles intact; some extremity contractions  Psychiatric: GCS 12; limited insight ad judgement at present     DVT Prophylaxis: Pneumatic Compression Devices  Code Status: Full Code presumed (mentioned as Full Code in Care Everywhere from 2018)    Disposition: Expected discharge in 2-3 days    Shaji Haider MD    Past Medical History    I have reviewed this patient's medical history and updated it with pertinent information if needed.   Past Medical History:   Diagnosis Date     Breast cancer (H)     Invasive ductal carcinoma of Left brst     Cancer (H)     Basel cell Skin cancer      Chronic pain      Clostridium difficile colitis 2011     Hyperlipidemia      Hypertension      Multiple sclerosis (H)      Nerve pain      Neurogenic bladder      " BRITTANY (obstructive sleep apnea)      Recurrent UTI        Past Surgical History   I have reviewed this patient's surgical history and updated it with pertinent information if needed.  Past Surgical History:   Procedure Laterality Date     BIOPSY NODE SENTINEL Left 8/15/2018    Procedure: BIOPSY NODE SENTINEL;;  Surgeon: Mariel Boucher MD;  Location: [facility]     LUMPECTOMY BREAST WITH SEED LOCALIZATION Left 8/15/2018    Procedure: LUMPECTOMY BREAST WITH SEED LOCALIZATION;  LEFT SEED LOCALIZED BREAST LUMPECTOMY WITH SENTINEL NODE BIOPSY ;  Surgeon: Mariel Boucher MD;  Location: [facility]     ORTHOPEDIC SURGERY      ankle       Prior to Admission Medications   Prior to Admission Medications   Prescriptions Last Dose Informant Patient Reported? Taking?   CIPROFLOXACIN PO   Yes No   Calcium Citrate-Vitamin D (CITRACAL + D PO)   Yes No   Cholecalciferol (VITAMIN D PO)   Yes No   Sig: Take 1,500 Units by mouth    Cyanocobalamin (VITAMIN B12 PO)   Yes No   Sig: Take 100 mcg by mouth    HYDROcodone-acetaminophen (NORCO) 5-325 MG per tablet   No No   Sig: Take 1-2 tablets by mouth every 4 hours as needed for other (Moderate to Severe Pain)   HYDROcodone-acetaminophen (VICODIN) 5-500 MG per tablet   Yes No   Sig: Take 1 tablet by mouth every 6 hours as needed For pain. Max acetaminophen dose: 4000 mg in 24hrs.   baclofen (LIORESAL) 10 MG tablet   Yes No   Sig: Take 3 tablets by mouth 4 times daily   diazepam (VALIUM) 5 MG tablet   Yes No   Sig: Take 5 mg by mouth 3 times daily as needed   imipramine (TOFRANIL) 10 MG tablet   Yes No   Sig: Take 10 mg by mouth At Bedtime    pramipexole (MIRAPEX) 0.25 MG tablet   Yes No   Sig: Take 0.125 mg by mouth At Bedtime Take 2-3 hours before bedtime   traMADol (ULTRAM) 50 MG tablet   Yes No   Sig: Take 50 mg by mouth 2 times daily      Facility-Administered Medications: None     Allergies   Allergies   Allergen Reactions     Solu-Medrol [Methylprednisolone]      High Dose caused Delirium        Social History   I have reviewed this patient's social history and updated it with pertinent information if needed. Mariela Allen  reports that she has never smoked. She has never used smokeless tobacco. She reports current alcohol use.    Family History   Family history assessed and, except as above, is non-contributory.    Review of Systems   The 10 point Review of Systems is negative other than noted in the HPI or here.     Primary Care Physician   Apurva Jackson    Data   Labs Ordered and Resulted from Time of ED Arrival Up to the Time of Departure from the ED   COMPREHENSIVE METABOLIC PANEL - Abnormal; Notable for the following components:       Result Value    Glucose 161 (*)     Creatinine 1.06 (*)     GFR Estimate 53 (*)     ALT 54 (*)      (*)     All other components within normal limits   ROUTINE UA WITH MICROSCOPIC REFLEX TO CULTURE - Abnormal; Notable for the following components:    Blood Urine Small (*)     Protein Albumin Urine 30 (*)     Leukocyte Esterase Urine Large (*)     WBC Urine >182 (*)     RBC Urine 106 (*)     WBC Clumps Present (*)     Squamous Epithelial /HPF Urine 3 (*)     All other components within normal limits   CBC WITH PLATELETS DIFFERENTIAL   MAGNESIUM   TROPONIN I   LACTIC ACID WHOLE BLOOD   ALCOHOL ETHYL   COVID-19 VIRUS (CORONAVIRUS) BY PCR   VITAL SIGNS   PULSE OXIMETRY NURSING   CARDIAC CONTINUOUS MONITORING   PERIPHERAL IV CATHETER   NURSING DRAW AND HOLD   URINE CULTURE AEROBIC BACTERIAL       Data reviewed today:  I personally reviewed the EKG tracing showing NSR without ST segment changes.    Recent Results (from the past 24 hour(s))   CT Head w/o Contrast    Narrative    EXAM: CT HEAD W/O CONTRAST  LOCATION: [facility]  DATE/TIME: 8/16/2020 11:41 PM    INDICATION: Acute mental status and hypotension. Somnolent.  COMPARISON: None.  TECHNIQUE: Routine without IV contrast. Multiplanar reformats. Dose reduction techniques were  used.    FINDINGS:  INTRACRANIAL CONTENTS: No intracranial hemorrhage, extraaxial collection, or mass effect.  No CT evidence of acute infarct. Normal parenchymal attenuation. Mild generalized volume loss. No hydrocephalus.     VISUALIZED ORBITS/SINUSES/MASTOIDS: No intraorbital abnormality. No paranasal sinus mucosal disease. No middle ear or mastoid effusion.    BONES/SOFT TISSUES: No acute calvarial fracture. Focal scalp subcutaneous stranding at the midline vertex.      Impression    IMPRESSION:  1.  No evidence of an acute intracranial abnormality.  2.  No acute calvarial fracture. Focal scalp subcutaneous stranding at the midline vertex may reflect contusion or scarring. Correlate with physical exam findings.   XR Chest Port 1 View    Narrative    EXAM: XR CHEST PORT 1 VW  LOCATION: [facility]  DATE/TIME: 8/16/2020 11:49 PM    INDICATION: Altered mental status. Hypotension.  COMPARISON: None.      Impression    IMPRESSION: Shallow inspiration. Mild cardiac enlargement. No focal consolidation or pleural effusion.

## 2020-08-17 NOTE — ED NOTES
[facility]  ED Nurse Handoff Report    ED Chief complaint: Altered Mental Status      ED Diagnosis:   Final diagnoses:   Encephalopathy   Urinary tract infection without hematuria, site unspecified   Multiple sclerosis (H)       Code Status: Full Code    Allergies:   Allergies   Allergen Reactions     Solu-Medrol [Methylprednisolone]      High Dose caused Delirium       Patient Story: pt with hx of MS brought in by EMS from home for altered mental status. Family went to check in on patient at 2200 last night and found her to lethargic and difficult to arouse. Pt dispenses own medication (trazodone and valium) but  states she did not take any night time meds last night. No fevers or chills.  Focused Assessment:  Pt is pleasantly confused. Arousable to sternal rub, but once awake, will hold conversation with nurse and obey commands. Respirations easy and unlabored. Denies pain. offers no complaints while in ED. Wheelchair bound due to MS.     Treatments and/or interventions provided: CT of head, labs, antibiotics for UTI  Patient's response to treatments and/or interventions:     To be done/followed up on inpatient unit:      Does this patient have any cognitive concerns?: confused    Activity level - Baseline/Home:  Wheelchair  Activity Level - Current:   Total Care    Patient's Preferred language: English   Needed?: No    Isolation: None  Infection: Not Applicable  Bariatric?: No    Vital Signs:   Vitals:    08/16/20 2315 08/16/20 2330 08/16/20 2351 08/17/20 0013   BP: 113/80 105/50 111/60 113/53   Pulse: 80 71 70 70   Resp: 24 11 16 16   Temp:       TempSrc:       SpO2: 97% 95% 97% 91%   Weight:       Height:           Cardiac Rhythm:     Was the PSS-3 completed:   Yes  What interventions are required if any?               Family Comments: in demographics  OBS brochure/video discussed/provided to patient/family: N/A              Name of person given brochure if not patient: n/a               Relationship to patient: n/a    For the majority of the shift this patient's behavior was Green.   Behavioral interventions performed were reassurance and information.    ED NURSE PHONE NUMBER: *[id number]

## 2020-08-17 NOTE — PROGRESS NOTES
"[facility] Nurse Inpatient Assessment   Reason for consultation: Evaluate and treat buttocks    Assessment          Epidermis intact with some blanchable pink erythema throughout the sacral, coccyx, gluteal cleft, buttocks, gluteal folds, no pressure injury noted, skin looks good.  Tissue is warm/ hot to the touch, pinkish, this appears to be due to \"heat rash\" vs any pressure    Treatment Plan    buttocks plan of care: effective now   1. Vigilant hygiene measures using Pretty Cleanse and Protect, wipe dry   - dust with antifungal powder x 5 days, lightly rub into tissue, stop after 5 days, august 23, then switch to baby powder  2. Position side to side only when in bed, LEAVE THE BRIEF OPEN/ LOOSE IN BED!  3. At home, at bed time have your NA position you on your side, then later in the night you may fall to your back, giving your backside some pressure relief and airing out your skin   The next night position yourself starting out on the other side    Recommended provider order: n/a   Orders written  [facility] Nurse follow-up plan: signing off    Patient History    According to provider note(s):  70 year old woman with past history that is most notable for MS causing tetraplegia and neurogenic bladder and well as chronic spastic and neuropathic pain; who presents with somnolence and hypotension and is found to have acute encephalopathy suspected due to UTI.    Objective Data    Containment of urine/stool: Diaper, Incontinent pad in bed and External catheter  Active Diet Order:  Orders Placed This Encounter      Regular Diet Adult    Labs:   Recent Labs   Lab 08/17/20  0651   ALBUMIN 3.3*   HGB 11.5*   WBC 6.7     Output:   No intake/output data recorded.  Risk Assessment:   Sensory Perception: 3-->slightly limited  Moisture: 3-->occasionally moist  Activity: 2-->chairfast  Mobility: 2-->very limited  Nutrition: 3-->adequate  Friction and Shear: 2-->potential problem  Barrett Score: " 15    Physical Exam    Buttocks/ backside assessment   Wound / skin history: pt has a pca at home 2hrs x 2 per day  Photo date: 08-17-20      Epidermis intact with blanchable pink erythema.  Tissue warm/ hot to the touch, no PI  **    Interventions  Current support surface: Standard  Atmos Air mattress,   Current off-loading measures: Pillows under calves and Pillows,   Visual inspection of wound(s) completed with RN  Wound Care: None necessary,   Supplies: reviewed, gathered, placed at the bedside, discussed with RN and discussed with patient  Education provided: importance of repositioning, plan of care, Moisture management, Hygiene and Off-loading pressure  Discussed plan of care with Patient and Nurse    Yu Prakash RN

## 2020-08-17 NOTE — ED NOTES
Writer spoke to Sandra, patient's caregiver, with patient's permission. Updated Sandra on patient status and plan. Questions answered. Sandra requested an update in the morning, if possible.

## 2020-08-17 NOTE — PLAN OF CARE
Problem: Adult Inpatient Plan of Care  Goal: Plan of Care Review  Outcome: Improving     Problem: UTI (Urinary Tract Infection)  Goal: Improved Infection Symptoms  Outcome: Improving     Pt alert and oriented x 4 now, but was having some hallucinations this afternoon, hearing someone talking about her cat-MD aware.  Refuses repositioning at times. IV antibiotics being given, does have burning on urination. Total care patient, w/c bound at baseline.

## 2020-08-17 NOTE — ED NOTES
Bed: ST03  Expected date:   Expected time:   Means of arrival:   Comments:  E1  70F AMS/Hypotensive/Oxygen dependent  [id number]

## 2020-08-17 NOTE — ED NOTES
One of patient's caregivers, Sandra Kauffman, is okay to receive updates per patient. Phone number and name entered into demographics.

## 2020-08-17 NOTE — ED PROVIDER NOTES
History     Chief Complaint:  Altered Mental Status     The history is provided by the patient and the EMS personnel. History limited by: altered mental status.      Mariela Allen is a 70 year old female with a history of MS, hypertension, hyperlipidemia, breast cancer, neurogenic bladder, who presents with altered mental status via EMS. Per EMS report, the patient was last known at baseline at 1100 this morning. The patient's caregiver checked on the patient tonight at 2200 to find her altered and confused, which the patient had agreed to feel more confused. She was generally alert, but was noted to be falling asleep mid conversations. EMS report that they measured her blood pressure to be hypotensive to the 70's systolic but this has since improved. She had a blood glucose of 165. The patient's 's is debilitated and she administers her own medications at home, but it is not clear if she could have ingested extra. There was reports of a recent mild cough. Here, the patient states that she has chronic weakness to her bilateral arms but denies wheezing, fever, vomiting, diarrhea, or rash. She states she did not take any extra medications tonight and she does not take blood thinners. She drinks alcohol but denies use tonight.     Allergies:  Solu-Medrol     Medications:    baclofen   diazepam  imipramine   pramipexole   Trazodone   Tramadol     Past Medical History:    Breast cancer   Hyperlipidemia   Hypertension   Multiple sclerosis   Anterior shoulder dislocation  Neurogenic bladder     Past Surgical History:    Biopsy node sentinel   Lumpectomy breast with seed localization   Ankle surgery     Family History:    Family history reviewed. No pertinent family history.     Social History:  The patient was accompanied to the ED by EMS.  Smoking Status: Never Smoker  Smokeless Tobacco: Never Used  Alcohol Use: Positive  Drug Use: Negative  PCP: Apurva Jackson   Marital Status:        Review of  "Systems   Constitutional: Negative for fever.   Respiratory: Positive for cough. Negative for wheezing.    Gastrointestinal: Negative for diarrhea and vomiting.   Skin: Negative for rash.   Neurological: Weakness: chronic.   Psychiatric/Behavioral: Positive for confusion.   All other systems reviewed and are negative.    Physical Exam     Patient Vitals for the past 24 hrs:   BP Temp Temp src Pulse Heart Rate Resp SpO2 Height Weight   08/17/20 0130 106/59 -- -- 69 69 19 90 % -- --   08/17/20 0115 110/69 -- -- 70 70 18 90 % -- --   08/17/20 0100 124/72 -- -- 70 70 17 91 % -- --   08/17/20 0013 113/53 -- -- 70 73 16 91 % -- --   08/16/20 2351 111/60 -- -- 70 73 16 97 % -- --   08/16/20 2330 105/50 -- -- 71 70 11 95 % -- --   08/16/20 2315 113/80 -- -- 80 80 24 97 % -- --   08/16/20 2303 91/57 98.4  F (36.9  C) Oral -- 71 16 92 % 1.575 m (5' 2\") 65 kg (143 lb 4.8 oz)      Physical Exam  General: Thin, appears chronically ill  Eyes: PERRL, conjunctivae pink no scleral icterus or conjunctival injection  ENT:  Moist mucus membranes, posterior oropharynx clear without erythema or exudates  Respiratory:  Lungs clear to auscultation bilaterally, no crackles/rubs/wheezes.  Good air movement  CV: Normal rate and rhythm, no murmurs/rubs/gallops  GI:  Abdomen soft and non-distended.  Normoactive BS.  No tenderness, guarding or rebound  Skin: Warm, dry.  No rashes or petechiae  Musculoskeletal: No peripheral edema or calf tenderness  Neuro: Alert and oriented to person but not to place or time.  She repeatedly asked for where her purse is.  She will be alert but then subsequently fall asleep and start snoring.  She rouses to voice.  Legs in braces with generalized weakness.  Hands with contractures and weakness.  Able to lift arms bilaterally.  No focal cranial nerve deficits are apparent.  Psychiatric: Normal affect    Emergency Department Course     ECG:  ECG taken at 2306, ECG read at 2321  Normal sinus rhythm  Low voltage " QRS  Borderline ECG  Rate 70 bpm. NH interval 144 ms. QRS duration 90 ms. QT/QTc 420/453 ms. P-R-T axes 17 1 28.    Imaging:  Radiology findings were unable to be communicated with the patient due to condition of the patient.      XR Chest Port 1 View  Shallow inspiration. Mild cardiac enlargement. No focal consolidation or pleural effusion.  Reading per radiology    CT Head w/o Contrast  1.  No evidence of an acute intracranial abnormality.  2.  No acute calvarial fracture. Focal scalp subcutaneous stranding at the midline vertex may reflect contusion or scarring. Correlate with physical exam findings.  Reading per radiology      Laboratory:  Laboratory findings were unable to be communicated with the patient due to condition of the patient.      Asymptomatic COVID-19 Virus (Coronavirus) by PCR Nasopharyngeal swab: Pending     UA: Blood small (A), Protein Albumin 30 (A), Leukocyte Esterase large (A),  (H),  (H), WBC Clumps present (A), Squamous Epithelial 3 (H), o/w WNL.    Urine culture: Pending     CBC: WBC 8.9, HGB 12.5,   CMP: Glucose 161 (H), GFR 53 (L), ALT 54 (H),  (H), o/w WNL (Creatinine 1.06 (H))    Troponin (Collected 2306): <0.015     Lactic Acid (Resulted: 2318): 1.5     Magnesium: 1.9    Alcohol Level Blood: <0.01    Interventions:  2317 NS 1000 mL IV   0003 Rocephin 1 g IV    Emergency Department Course:    2252 Nursing notes and vitals reviewed. I performed an exam of the patient as documented above.     2306 IV was inserted and blood was drawn for laboratory testing, results above. EKG obtained as noted above. A COVID-19 nasal swab PCR test was obtained for laboratory testing as documented above.     2314 A catheter was inserted and urine sample was obtained. This was sent to laboratory for testing, results above.     2345 The patient was sent for a CT while in the emergency department, results above.      2349 Portable chest XR obtained in the ED, results above.       0050 I spoke with Dr. Haider of the hospitalist service from [facility] regarding patient's presentation, findings, and plan of care.      Prior to admission, I personally reviewed the results with the patient and all related questions were answered. The patient verbalized understanding and is amenable to plan.     Impression & Plan      Medical Decision Making:  Mariela Allen is a 70 year old female who presents to the emergency department today for evaluation of confusion and was found to have low blood pressure via EMS.  Without treatment, her blood pressure did improve.  Her blood pressure is remained stable here.  We did give her some IV fluids.  She has a neurologic exam that is abnormal secondary to her MS but, with the exception of her confusion, it seems consistent with her previous exam as documented by her neurologist.  Head CT shows no evidence of bleed.  Chest x-ray is clear.  Labs show no evidence of cardiac event or sepsis.  Urinalysis is consistent with urinary tract infection.  Urine culture is pending.  We treated her with parenteral antibiotics.  It is also possible that her symptoms are secondary to a medication side effect as she is on many sedating medications including baclofen, trazodone and diazepam and it is daytime.  The patient will be admitted to the hospital for further treatment of her urinary tract infection and for ongoing monitoring of her encephalopathy.  I spoke with Dr. Haider who graciously agreed admit the patient.    Covid-19  Mariela Allen was evaluated during a global COVID-19 pandemic, which necessitated consideration that the patient might be at risk for infection with the SARS-CoV-2 virus that causes COVID-19.   Applicable protocols for evaluation were followed during the patient's care.   COVID-19 was considered as part of the patient's evaluation. The plan for testing is:  a test was obtained during this visit.    Critical Care time was 20 minutes  for this patient excluding procedures.     Diagnosis:    ICD-10-CM    1. Encephalopathy  G93.40    2. Urinary tract infection without hematuria, site unspecified  N39.0    3. Multiple sclerosis (H)  G35      Disposition:   The patient is admitted into the care of Dr. Haider.     Scribe Disclosure:  I, Orla Severson, am serving as a scribe at 10:58 PM on 8/16/2020 to document services personally performed by Aurora Chavez MD based on my observations and the provider's statements to me.    EMERGENCY DEPARTMENT       Aurora Chavez MD  08/17/20 0511

## 2020-08-17 NOTE — PROGRESS NOTES
RECEIVING UNIT ED HANDOFF REVIEW    ED Nurse Handoff Report was reviewed by: Raymond Polanco RN on August 17, 2020 at 1:31 AM

## 2020-08-17 NOTE — ED TRIAGE NOTES
Pt BIBA from home. Per EMS, care staff went to check pt around 10pm. Pt appeared confused. EMS noted pt lethargic but arousable with SBP's in 80-90's. Pt does fill her own meds which include diazepam and trazodone. Blood glucose at 165; ETCO2 =35.

## 2020-08-17 NOTE — PHARMACY-ADMISSION MEDICATION HISTORY
Pharmacy Medication History  Admission medication history interview status for the 8/16/2020  admission is complete. See EPIC admission navigator for prior to admission medications     Medication history sources: Patient, Surescripts and Care Everywhere  Medication history source reliability: Good  Adherence assessment: Good    Additional medication history information:   none    Medication reconciliation completed by provider prior to medication history? No    Time spent in this activity: 45 minutes      Prior to Admission medications    Medication Sig Last Dose Taking? Auth Provider   anastrozole (ARIMIDEX) 1 MG tablet Take 1 mg by mouth daily 8/16/2020 at Unknown time Yes Unknown, Entered By History   baclofen (LIORESAL) 10 MG tablet Take 30 mg by mouth 4 times daily  8/16/2020 at Unknown time Yes Unknown, Entered By History   Calcium Citrate-Vitamin D (CITRACAL + D PO) Take 1 tablet by mouth daily  8/16/2020 at Unknown time Yes Reported, Patient   calcium polycarbophil (FIBERCON) 625 MG tablet Take 1 tablet by mouth 2 times daily 8/16/2020 at Unknown time Yes Unknown, Entered By History   Cholecalciferol 100 MCG (4000 UT) CAPS Take 4,000 Units by mouth daily 8/16/2020 at Unknown time Yes Unknown, Entered By History   cyanocobalamin (VITAMIN B-12) 100 MCG tablet Take 100 mcg by mouth daily 8/16/2020 at Unknown time Yes Unknown, Entered By History   diazepam (VALIUM) 5 MG tablet Take 5 mg by mouth 2 times daily as needed  as needed Yes Unknown, Entered By History   HYDROcodone-acetaminophen (NORCO) 5-325 MG per tablet Take 1-2 tablets by mouth every 4 hours as needed for other (Moderate to Severe Pain) as needed Yes Patricia Cooper R, DO   imipramine (TOFRANIL) 10 MG tablet Take 10 mg by mouth At Bedtime  8/15/2020 at Unknown time Yes Unknown, Entered By History   pramipexole (MIRAPEX) 0.25 MG tablet Take 0.25 mg by mouth At Bedtime Take 2-3 hours before bedtime 8/15/2020 at Unknown time Yes Unknown, Entered By  History   sertraline (ZOLOFT) 25 MG tablet Take 25 mg by mouth daily 8/16/2020 at Unknown time Yes Unknown, Entered By History   traMADol (ULTRAM) 50 MG tablet Take 50 mg by mouth 2 times daily Past Week at Unknown time Yes Unknown, Entered By History

## 2020-08-18 ENCOUNTER — APPOINTMENT (OUTPATIENT)
Dept: PHYSICAL THERAPY | Facility: CLINIC | Age: 70
DRG: 689 | End: 2020-08-18
Attending: PHYSICIAN ASSISTANT
Payer: COMMERCIAL

## 2020-08-18 LAB
ANION GAP SERPL CALCULATED.3IONS-SCNC: 4 MMOL/L (ref 3–14)
BUN SERPL-MCNC: 10 MG/DL (ref 7–30)
CALCIUM SERPL-MCNC: 8.3 MG/DL (ref 8.5–10.1)
CHLORIDE SERPL-SCNC: 107 MMOL/L (ref 94–109)
CO2 SERPL-SCNC: 29 MMOL/L (ref 20–32)
CREAT SERPL-MCNC: 0.63 MG/DL (ref 0.52–1.04)
GFR SERPL CREATININE-BSD FRML MDRD: >90 ML/MIN/{1.73_M2}
GLUCOSE SERPL-MCNC: 84 MG/DL (ref 70–99)
POTASSIUM SERPL-SCNC: 3 MMOL/L (ref 3.4–5.3)
SODIUM SERPL-SCNC: 140 MMOL/L (ref 133–144)

## 2020-08-18 PROCEDURE — 97110 THERAPEUTIC EXERCISES: CPT | Mod: GP

## 2020-08-18 PROCEDURE — 25000128 H RX IP 250 OP 636: Performed by: PHYSICIAN ASSISTANT

## 2020-08-18 PROCEDURE — 25000132 ZZH RX MED GY IP 250 OP 250 PS 637: Performed by: STUDENT IN AN ORGANIZED HEALTH CARE EDUCATION/TRAINING PROGRAM

## 2020-08-18 PROCEDURE — 97162 PT EVAL MOD COMPLEX 30 MIN: CPT | Mod: GP

## 2020-08-18 PROCEDURE — 97112 NEUROMUSCULAR REEDUCATION: CPT | Mod: GP

## 2020-08-18 PROCEDURE — 12000000 ZZH R&B MED SURG/OB

## 2020-08-18 PROCEDURE — 99232 SBSQ HOSP IP/OBS MODERATE 35: CPT | Performed by: PHYSICIAN ASSISTANT

## 2020-08-18 PROCEDURE — 25000132 ZZH RX MED GY IP 250 OP 250 PS 637: Performed by: HOSPITALIST

## 2020-08-18 PROCEDURE — 80048 BASIC METABOLIC PNL TOTAL CA: CPT | Performed by: PHYSICIAN ASSISTANT

## 2020-08-18 PROCEDURE — 36415 COLL VENOUS BLD VENIPUNCTURE: CPT | Performed by: PHYSICIAN ASSISTANT

## 2020-08-18 PROCEDURE — 25000132 ZZH RX MED GY IP 250 OP 250 PS 637: Performed by: PHYSICIAN ASSISTANT

## 2020-08-18 PROCEDURE — 25000128 H RX IP 250 OP 636: Performed by: HOSPITALIST

## 2020-08-18 RX ORDER — POTASSIUM CHLORIDE 1500 MG/1
40 TABLET, EXTENDED RELEASE ORAL EVERY 4 HOURS
Status: COMPLETED | OUTPATIENT
Start: 2020-08-18 | End: 2020-08-18

## 2020-08-18 RX ORDER — BACLOFEN 10 MG/1
30 TABLET ORAL 4 TIMES DAILY
Status: DISCONTINUED | OUTPATIENT
Start: 2020-08-18 | End: 2020-08-18

## 2020-08-18 RX ADMIN — ACETAMINOPHEN 650 MG: 325 TABLET, FILM COATED ORAL at 10:36

## 2020-08-18 RX ADMIN — ACETAMINOPHEN 650 MG: 325 TABLET, FILM COATED ORAL at 21:41

## 2020-08-18 RX ADMIN — POTASSIUM CHLORIDE 40 MEQ: 1500 TABLET, EXTENDED RELEASE ORAL at 11:38

## 2020-08-18 RX ADMIN — BACLOFEN 15 MG: 10 TABLET ORAL at 19:03

## 2020-08-18 RX ADMIN — ACETAMINOPHEN 650 MG: 325 TABLET, FILM COATED ORAL at 06:27

## 2020-08-18 RX ADMIN — IBUPROFEN 400 MG: 400 TABLET ORAL at 09:09

## 2020-08-18 RX ADMIN — BACLOFEN 15 MG: 10 TABLET ORAL at 21:41

## 2020-08-18 RX ADMIN — CEFTRIAXONE SODIUM 2 G: 2 INJECTION, POWDER, FOR SOLUTION INTRAMUSCULAR; INTRAVENOUS at 11:05

## 2020-08-18 RX ADMIN — POTASSIUM CHLORIDE 40 MEQ: 1500 TABLET, EXTENDED RELEASE ORAL at 13:50

## 2020-08-18 RX ADMIN — MELATONIN TAB 3 MG 3 MG: 3 TAB at 21:41

## 2020-08-18 RX ADMIN — ENOXAPARIN SODIUM 40 MG: 40 INJECTION SUBCUTANEOUS at 19:03

## 2020-08-18 ASSESSMENT — ACTIVITIES OF DAILY LIVING (ADL)
ADLS_ACUITY_SCORE: 26
ADLS_ACUITY_SCORE: 32
ADLS_ACUITY_SCORE: 32
ADLS_ACUITY_SCORE: 28
ADLS_ACUITY_SCORE: 28
ADLS_ACUITY_SCORE: 32

## 2020-08-18 NOTE — PLAN OF CARE
Alert and oriented x 4. Denies pain. Warm to touch and TMAX 100.4. Tylenol and ibuprofen available for fever. VSS on RA. Tolerating regular diet. Total feed. Neuros at baseline. Total care. Ax2 with lift. W/c baseline. Dysuria with voiding. Incontinent and purewick in place. LR infusing TKO with int. Rocephin. Skin intact with heat rash on buttocks/nilsa area. UC pending. Discharge pending.

## 2020-08-18 NOTE — PROGRESS NOTES
"   08/18/20 0900   Quick Adds   Type of Visit Initial PT Evaluation   Living Environment   Lives With spouse   Living Arrangements   ([facility]. )   Transportation Anticipated   (pt &  don't drive)   Living Environment Comment No stairs to enter - portable ramp available for threshold, then 1 level living.    Self-Care   Usual Activity Tolerance poor   Current Activity Tolerance poor   Equipment Currently Used at Home grab bar, toilet;orthotic device;shower chair;wheelchair, power   Activity/Exercise/Self-Care Comment [employer] [facility] 1x/wk with aide, not with a therapist anymore - at [place].  Planning to resume in Sep.    Functional Level Prior   Ambulation   (NA)   Transferring 3-->assistive equipment and person   Toileting 3-->assistive equipment and person   Bathing 3-->assistive equipment and person   Communication 0-->understands/communicates without difficulty   Swallowing 0-->swallows foods/liquids without difficulty   Cognition 0 - no cognition issues reported   Fall history within last six months no   Which of the above functional risks had a recent onset or change?   (No change per pt, \"my legs feel about normal.\" )   Prior Functional Level Comment 9-11am & 9-10pm assist daily: bed mobility assist, shower chair above toilet with grab bars, B AFOs with shoes, WC power Nicolas, stands on/off the shower chair above the toilet (\"that's really the only time I stand up all day\" - no AD just grab bars for pivot transfers with Ax1. \"Not to my knowledge\" when asked if she has an ILS worker or .    General Information   Onset of Illness/Injury or Date of Surgery - Date 08/16/20   Referring Physician Barthell, Joanna Kersten Ulmen, PA-C    Patient/Family Goals Statement To get home.    Pertinent History of Current Problem (include personal factors and/or comorbidities that impact the POC) Encephalopathy, NGUYEN, MS - paraplegia with neurogenic bladder, transaminitis, stage 2 sacral woun, " Depression, BRITTANY with limited CPAP compliance, breast CA post lumpectomy & radiation.    Precautions/Limitations fall precautions   Weight-Bearing Status - LUE full weight-bearing  (sll)   Cognitive Status Examination   Level of Consciousness alert   Follows Commands and Answers Questions 100% of the time   Personal Safety and Judgment impaired   Pain Assessment   Patient Currently in Pain Yes, see Vital Sign flowsheet  (legs, they're stiff)   Posture    Posture Comments Stiff given spasticity and impaired ROM.    Range of Motion (ROM)   ROM Comment DF < neutral B, adductors with <= abd B, hamstrings impaired B, R hand unable to fully open.    Strength   Strength Comments Grossly deconditioned wtih heavy assist. Pt reports feeling weaker in the core during sitting balance.    Bed Mobility   Bed Mobility Comments Total assist supine to/from sitting, scoot with tchnique cues.    Transfer Skills   Transfer Comments Held stand & pivot as pt's AFOs/shoes not here and she never transfers without them, compounded by impaired ROM and high falls risk .    Gait   Gait Comments Non-ambulatory at baseline.    Balance   Balance Comments sitting balance EOBed moderate assist initially improving to standby assist with bilateral rails until fatigued then min assist   Muscle Tone   Muscle Tone Comments Pt on 40mg baclofen 3x/day. Spasms in hamstrings and gastrocs noted, very tight B adductors and R fist.    General Therapy Interventions   Planned Therapy Interventions balance training;bed mobility training;joint mobilization;manual therapy;motor coordination training;neuromuscular re-education;ROM;strengthening;stretching;transfer training;risk factor education;home program guidelines;progressive activity/exercise;wheelchair management/propulsion training   Clinical Impression   Criteria for Skilled Therapeutic Intervention yes, treatment indicated   PT Diagnosis Impaired mobility   Influenced by the following impairments Impaired  "strength, balance, ROM, activity tolerance.    Functional limitations due to impairments Impaired mobility   Clinical Presentation Stable/Uncomplicated   Clinical Decision Making (Complexity) Low complexity   Therapy Frequency 5x/week   Predicted Duration of Therapy Intervention (days/wks) 1 week   Anticipated Equipment Needs at Discharge   (Consider naye if pt unable to transfer. )   Anticipated Discharge Disposition Home with Assist;Home with Home Therapy  (pending transfers. )   Risk & Benefits of therapy have been explained Yes   Patient, Family & other staff in agreement with plan of care Yes   [facility] TM \"6 Clicks\"   2016, Trustees of [facility], under license to [employer].  All rights reserved.   6 Clicks Short Forms Basic Mobility Inpatient Short Form   [facility]  \"6 Clicks\" V.2 Basic Mobility Inpatient Short Form   1. Turning from your back to your side while in a flat bed without using bedrails? 1 - Total   2. Moving from lying on your back to sitting on the side of a flat bed without using bedrails? 1 - Total   3. Moving to and from a bed to a chair (including a wheelchair)? 1 - Total   4. Standing up from a chair using your arms (e.g., wheelchair, or bedside chair)? 1 - Total   5. To walk in hospital room? 1 - Total   6. Climbing 3-5 steps with a railing? 1 - Total   Basic Mobility Raw Score (Score out of 24.Lower scores equate to lower levels of function) 6   Total Evaluation Time   Total Evaluation Time (Minutes) 20     "

## 2020-08-18 NOTE — PROGRESS NOTES
[facility]    Medicine History and Physical - Hospitalist Service       Date of Admission:  8/16/2020    Assessment & Plan   Mariela Allen is a 70 year old female admitted on 8/16/2020. PMHx MS with paraplegia, spasticity, and neurogenic bladder who presented with altered mental status.    Acute encephalopathy. - improving.  Admitted with hypotension (reportedly in 70s with EMS and improved prior to ED arrival), somnolence, and confusion. Possible UTI and/or polypharmacy. UA showing pyuria; last UTI 6 weeks ago requiring abx through [facility]. Also on several sedating medications and reports taking 2 [place] because she ran out of Tramadol. Recalls having 1 beer on day of adm. CXR and head CT without acute pathology.  - PTA meds held on adm.  - Resume Baclofen (8/18) at reduced dose per patient request. Recommend follow-up with her primary neurologist at [facility].  - Urine ctx NGTD.  - Cont empiric ceftriaxone, initiated 8/16.  - Regular diet.  - PT rec return home with home PT and PCA services.    Chronic MS with paraplegia, neurogenic bladder.  Wheelchair bound and home care in twice daily in AM and PM to assist with daily cares.  - Resume Baclofen at reduced dose as above.  - Holding PTA Valium, Norco, Tramadol, imipramine, Mirapex.    NGUYEN. - resolved.  Cr 1.06 (0.59 in 9/2019). Likely prerenal related to hypotension on presentation. Possibly hypovolemic.  - Saline lock LR IVF.     Transaminitis. Mild and improving. Cause unclear, possibly related to hypotension on adm. NH3 undetectable.     Stage 2 sacral decubitus ulcers. Present on admission: WOC consulted    Depression. Zoloft on hold.    BRITTANY. Not compliant with CPAP.    BCa s/p lumpectomy and radiation (2018). Resume PTA Arimidex on discharge.    Diet: Regular Diet Adult    Guadarrama Catheter: not present    DVT Prophylaxis: Enoxaparin (Lovenox) SQ  Code Status: Full Code - mentation improved, confirms full code    Expected discharge:  8/19 recommended to prior living arrangements once pending urine ctx results and afebrile x 24 hours.    The patient's care was discussed with the Attending Physician, Dr. Lawson and Patient.    JoAnna K. Barthell, PA-C  Hospitalist Service  [facility]    ______________________________________________________________________    Interval History   Mentation feels back to baseline. No further AH. Wonders if her Baclofen dose is too high as complains feels tired all the time x 1 year. Low grade fever overnight 100.4 F. Incontinent at baseline.    Data reviewed today: I reviewed all medications, new labs and imaging results over the last 24 hours. I personally reviewed no images or EKG's today.    Physical Exam   Vital Signs: Temp: 98.2  F (36.8  C) Temp src: Oral BP: (!) 140/51   Heart Rate: 74 Resp: 16 SpO2: 95 % O2 Device: None (Room air)    Weight: 143 lbs 4.78 oz  Constitutional: Appears stated age, no acute distress. Alert and oriented x 3. Answers questions appropriately.  Respiratory: Breath sounds CTA. No increased work of breathing.  Cardiovascular: RRR, no rub or murmur. No peripheral edema.  GI: Soft, non-tender, non-distended.  Skin: Warm, dry, no rashes or lesions.   MSK: Diffuse muscle atrophy. All four extremities with contractures.    Medications     lactated ringers 10 mL/hr at 08/18/20 1100       baclofen  15 mg Oral 4x Daily     cefTRIAXone  2 g Intravenous Q24H     enoxaparin ANTICOAGULANT  40 mg Subcutaneous Q24H     sodium chloride (PF)  3 mL Intracatheter Q8H       Data   Recent Labs   Lab 08/18/20  0725 08/17/20  1533 08/17/20  0651 08/16/20  2306   WBC  --   --  6.7 8.9   HGB  --   --  11.5* 12.5   MCV  --   --  96 96   PLT  --  239 264 326     --  138 134   POTASSIUM 3.0*  --  3.8 3.9   CHLORIDE 107  --  105 100   CO2 29  --  29 28   BUN 10  --  15 16   CR 0.63 0.68 0.78 1.06*   ANIONGAP 4  --  4 6   KIMBER 8.3*  --  8.3* 9.0   GLC 84  --  107* 161*   ALBUMIN  --   --   3.3* 3.8   PROTTOTAL  --   --  6.6* 7.5   BILITOTAL  --   --  0.3 0.5   ALKPHOS  --   --  72 80   ALT  --   --  41 54*   AST  --   --  50* 117*   TROPI  --   --   --  <0.015       Imaging:  No results found for this or any previous visit (from the past 24 hour(s)).

## 2020-08-18 NOTE — PLAN OF CARE
Discharge Planner OT   Patient plan for discharge: return home w/continued services  Current status: OT orders received, chart reviewed. Noted patient has PCA daily for AM/PM cares, receives assist with all self-cares and services to be resume at discharge. Therefore, no acute skilled OT intervention indicated during hospital stay. PT will continue to follow for transfer training and OT will sign off and complete order.   Barriers to return to prior living situation: defer to medical team  Recommendations for discharge: defer to medical team  Rationale for recommendations: defer to medical team       Entered by: Drew Anguiano 08/18/2020 1:27 PM

## 2020-08-18 NOTE — PROVIDER NOTIFICATION
MD Notification    Notified Person: MD    Notified Person Name: Barthell, PA    Notification Date/Time: 15:54, 8/118/2020    Notification Interaction: text page    Purpose of Notification: Potassium replaced on day shift, did you want recheck before tonight? Thanks     Orders Received: no call back    Comments:

## 2020-08-18 NOTE — CONSULTS
"Spoke with patient today regarding Home Care Services. It was recommended by Physical Therapy to have Home Physical Therapy. Patient has agreed to this and also has agreed to using [facility]. Referral was sent via e-mail as a \"heads Up\" to [facility] since patient not ready for discharge yet. Orders will need to be entered at discharge. Patient would also benefit from an RN along with Physical Therapy. Care Coordinator will follow along for discharge needs.    Addendum: Please note that patient has PCA services already in place.  "

## 2020-08-18 NOTE — PLAN OF CARE
Problem: Adult Inpatient Plan of Care  Goal: Plan of Care Review  8/18/2020 1414 by Orquidea Aragon, RN  Outcome: Improving  8/18/2020 0516 by Sara Junior RN  Outcome: No Change     Problem: UTI (Urinary Tract Infection)  Goal: Improved Infection Symptoms  8/18/2020 1414 by Orquidea Aragon, RN  Outcome: Improving  8/18/2020 0516 by Sara Junior RN  Outcome: No Change     IV antibiotics still being given, UC still pending.  Turn and repo using z-flow pillow. Alert x 4, forgetful, some intermittent confusion. Poor appetite, nursing encouraging diet. Powder applied for rash, rash improving.

## 2020-08-18 NOTE — PROGRESS NOTES
Dr. Delma THOMSON paged regarding cpap orders and possibly ibuprofen for fevers.  Orquidea Aragon RN

## 2020-08-18 NOTE — PLAN OF CARE
Discharge Planner PT   Patient plan for discharge: Home  Current status: Total assist sit to/from sitting, sitting balance EOBed moderate assist initially improving to standby assist with bilateral rails until fatigued then min assist (<10min), held stand & pivot as pt's AFOs not here and she never transfers without them (also ROM lacks DF to neutral with high falls risk) - PT asked pt to have someone bring them today for use in PT tomorrow, pt plans to try.   Barriers to return to prior living situation: Need to assess transfers once AFOs & shoes arrive.  Recommendations for discharge: Home with ongoing PCA services, home PT - this is pending stand pivots with her AFOs & shoes here. Recommend naye lift if pt unable to transfer.   Rationale for recommendations: Pt reports core strength weaker during sitting balance. Pt unable to perform transfers as AFOs not here, she needs to do them for a safe discharge home.        Entered by: Brittany Dressler 08/18/2020 9:46 AM

## 2020-08-18 NOTE — CONSULTS
Care Transition Initial Assessment - RN        Met with: Patient  DATA   Active Problems:    Septic encephalopathy       Cognitive Status: awake, alert, oriented x4        Contact information and PCP information verified:   Dr Apur[facility]  Lives With:  spouse   Living Arrangements: Townhouse, no stairs                   Insurance concerns: NO,  [employer] Medicare Advantage  ASSESSMENT  Patient currently receives the following services:  PCA, [employer] [facility] 1x/wk with aide.(will resume in Sept.)       Identified issues/concerns regarding health management:  Her MS causing on going medical issues.    PLAN  Financial costs for the patient include Private pay for [employer] Transportation, patient ok with using [employer], Wheelchair ride.     Patient given options and choices for discharge YES   Patient/family is agreeable to the plan?  YES    Patient anticipates discharging to Home with Spouse, Home Physical Therapy, and resume PCA Services. Patient agrees with using [facility]. Heads up sent to [facility] on 8/18.     Patient anticipates needs for home equipment: She has requested Purewick for Urinary Incontinence. She may need a Zoe Lift for transfers.     Transportation/person available to transport on day of discharge  is:  Needs a Wheelchair ride with [employer] set up.     Plan/Disposition:  Home with resumption of PCA Service and [facility].    Appointments: None noted      Care  (CTS) will continue to follow as needed.

## 2020-08-19 ENCOUNTER — APPOINTMENT (OUTPATIENT)
Dept: PHYSICAL THERAPY | Facility: CLINIC | Age: 70
DRG: 689 | End: 2020-08-19
Payer: COMMERCIAL

## 2020-08-19 PROCEDURE — 25000132 ZZH RX MED GY IP 250 OP 250 PS 637: Performed by: INTERNAL MEDICINE

## 2020-08-19 PROCEDURE — 97530 THERAPEUTIC ACTIVITIES: CPT | Mod: GP | Performed by: PHYSICAL THERAPIST

## 2020-08-19 PROCEDURE — 25000132 ZZH RX MED GY IP 250 OP 250 PS 637: Performed by: HOSPITALIST

## 2020-08-19 PROCEDURE — 99233 SBSQ HOSP IP/OBS HIGH 50: CPT | Performed by: INTERNAL MEDICINE

## 2020-08-19 PROCEDURE — 12000000 ZZH R&B MED SURG/OB

## 2020-08-19 PROCEDURE — 25000128 H RX IP 250 OP 636: Performed by: PHYSICIAN ASSISTANT

## 2020-08-19 PROCEDURE — 25000132 ZZH RX MED GY IP 250 OP 250 PS 637: Performed by: PHYSICIAN ASSISTANT

## 2020-08-19 RX ORDER — IMIPRAMINE HYDROCHLORIDE 10 MG/1
10 TABLET, FILM COATED ORAL AT BEDTIME
Status: DISCONTINUED | OUTPATIENT
Start: 2020-08-19 | End: 2020-08-20 | Stop reason: HOSPADM

## 2020-08-19 RX ORDER — PRAMIPEXOLE DIHYDROCHLORIDE 0.25 MG/1
0.25 TABLET ORAL AT BEDTIME
Status: DISCONTINUED | OUTPATIENT
Start: 2020-08-19 | End: 2020-08-20 | Stop reason: HOSPADM

## 2020-08-19 RX ORDER — ANASTROZOLE 1 MG/1
1 TABLET ORAL DAILY
Status: DISCONTINUED | OUTPATIENT
Start: 2020-08-19 | End: 2020-08-20 | Stop reason: HOSPADM

## 2020-08-19 RX ORDER — SERTRALINE HYDROCHLORIDE 25 MG/1
25 TABLET, FILM COATED ORAL DAILY
Status: DISCONTINUED | OUTPATIENT
Start: 2020-08-19 | End: 2020-08-20 | Stop reason: HOSPADM

## 2020-08-19 RX ORDER — DIAZEPAM 5 MG
5 TABLET ORAL 2 TIMES DAILY PRN
Status: DISCONTINUED | OUTPATIENT
Start: 2020-08-19 | End: 2020-08-20 | Stop reason: HOSPADM

## 2020-08-19 RX ADMIN — AMOXICILLIN AND CLAVULANATE POTASSIUM 1 TABLET: 875; 125 TABLET, FILM COATED ORAL at 13:51

## 2020-08-19 RX ADMIN — BACLOFEN 15 MG: 10 TABLET ORAL at 13:50

## 2020-08-19 RX ADMIN — ANASTROZOLE 1 MG: 1 TABLET, COATED ORAL at 13:54

## 2020-08-19 RX ADMIN — SERTRALINE HYDROCHLORIDE 25 MG: 25 TABLET, FILM COATED ORAL at 13:51

## 2020-08-19 RX ADMIN — BACLOFEN 15 MG: 10 TABLET ORAL at 09:21

## 2020-08-19 RX ADMIN — ENOXAPARIN SODIUM 40 MG: 40 INJECTION SUBCUTANEOUS at 18:12

## 2020-08-19 RX ADMIN — PRAMIPEXOLE DIHYDROCHLORIDE 0.25 MG: 0.25 TABLET ORAL at 21:57

## 2020-08-19 RX ADMIN — BACLOFEN 15 MG: 10 TABLET ORAL at 21:57

## 2020-08-19 RX ADMIN — IMIPRAMINE HYDROCHLORIDE 10 MG: 10 TABLET ORAL at 21:57

## 2020-08-19 RX ADMIN — BACLOFEN 15 MG: 10 TABLET ORAL at 18:12

## 2020-08-19 RX ADMIN — ACETAMINOPHEN 650 MG: 325 TABLET, FILM COATED ORAL at 06:49

## 2020-08-19 ASSESSMENT — ACTIVITIES OF DAILY LIVING (ADL)
ADLS_ACUITY_SCORE: 26
ADLS_ACUITY_SCORE: 30
ADLS_ACUITY_SCORE: 26
ADLS_ACUITY_SCORE: 30

## 2020-08-19 ASSESSMENT — MIFFLIN-ST. JEOR: SCORE: 1112.82

## 2020-08-19 NOTE — PLAN OF CARE
Pt AO, VSS on RA., afebrile Denies pain. Neuros intact ex baseline BLE weakness, RUE contracted and 3/5 and numbness/tingling in extremities. Purewick in place. Up with lift. Turn/repo. IV SL. Continue to monitor.

## 2020-08-19 NOTE — PLAN OF CARE
Pt A&O. VSS on RA, afebrile. Denied pain/nausea. PIV SL. Purewick in place with adequate UOP. Bottom red/blanchable with minor rash. Repositioned q2h. Plan pending- possibly home today- if not, patient requested CPAP for tonight.    Addendum: Patient woke up very stiff this AM and with a headache, tylenol given x1. Patient wants to address decrease in baclofen.

## 2020-08-19 NOTE — PLAN OF CARE
A&Ox4. Tmax 99.7, otherwise VSS on RA. Mild pain w/ turns, tylenol given x1. Denies nausea. Regular diet & tolerating well, total feed. A2 w/ lift, T/R q2 hours when in bed. Blanchable redness to coccyx. Purewick in place w/ adequate UOP. PIV SL, int abx. Discharge pending.

## 2020-08-19 NOTE — PROGRESS NOTES
"Met with patient. She is undecided about a Zoe at home. She is at her \"baseline\" able to transfer with assist, but concerned that there are days when she is weaker. She will discuss with spouse this evening. She is also interested in getting a PureWick system for urine collection. Writer called [employer]- unable to find any type of PureWick system. Info found on the internet- given to the patient.    She will need a wheelchair ride set up thru [facility]- she is aware of cost. No steps into the home   "

## 2020-08-19 NOTE — PLAN OF CARE
Discharge Planner PT   Patient plan for discharge: home with assist from PCA and HOme PT  Current status: Pt in supine and requires maxA of one to sit EOB. Pt leans and grasps bedrail for support and requires modA for balance. Pt required maxA to don socks, AFOs and shoes.wheelchair set up for transfer.  Pt reports that she uses a pivot disc with assist of one at home. Without a disc a pivot would most likely be unsuccessful. AIde was used to simulate the grab bar pt uses and pt was assisted to stand with mod to maxA of one. Pt repeated. Pt was assisted back to supine with maxA and repositioned with maxA of 2. Pt is able to be assisted with one but pt feels weaker afrter inactivity.   Barriers to return to prior living situation: slightly below baseline  Recommendations for discharge: home with assist of PCA and home PT  Rationale for recommendations: Due to bedrest, pt is declining in strength slightly compared to baseline. Pt can be managed by one but is mod to maxA. Pt would benefit from home PT to address weakness, need for ROM, balance and it is increased burden to leave home for therapy so appropriate for home PT       Entered by: Brook Bell 08/19/2020 3:36 PM

## 2020-08-19 NOTE — PROGRESS NOTES
[facility]    Medicine History and Physical - Hospitalist Service       Date of Admission:  8/16/2020    Assessment & Plan   Mariela Allen is a 70 year old female admitted on 8/16/2020. PMHx MS with paraplegia, spasticity, and neurogenic bladder who presented with altered mental status.    Acute encephalopathy. - improving.  Admitted with hypotension (reportedly in 70s with EMS and improved prior to ED arrival), somnolence, and confusion. Possible UTI and/or polypharmacy. UA showing pyuria; last UTI 6 weeks ago requiring abx through [facility]. Also on several sedating medications and reports taking 2 [employer] because she ran out of Tramadol. Recalls having 1 beer on day of adm. CXR and head CT without acute pathology.  - PTA meds held on adm. will restart some of them slowly as her mental status is back to baseline.  -Patient should follow-up with her primary neurologist upon discharge  - Urine urine culture is growing gram-positive cocci, at this point it could be staph aureus, staph saprophyticus for enterococcus, if it is penicillin resistant enterococcus we will have to adjust her antibiotic since he presented with altered mental status, will wait for sensitivities prior to discharge.  This was discussed with patient and nursing.  -Antibiotics changed from Rocephin to Augmentin today 8/19.  - Regular diet.  - PT rec return home with home PT and PCA services.    Chronic MS with paraplegia, neurogenic bladder.  Wheelchair bound and home care in twice daily in AM and PM to assist with daily cares.  -Continue baclofen, will restart some of her PTA medications which include imipramine, Mirapex and Valium.    NGUYEN. - resolved.  Cr 1.06 (0.59 in 9/2019). Likely prerenal related to hypotension on presentation. Possibly hypovolemic.  -Repeat BMP ordered due to hypokalemia noted on 8/18.     Transaminitis. Mild and improving.  Possibly secondary to dehydration, will obtain CMP in a.m.    Stage  2 sacral decubitus ulcers. Present on admission: WOC consulted    Depression. Zoloft on hold.    BRITTANY. Not compliant with CPAP.    BCa s/p lumpectomy and radiation (2018). Resume PTA Arimidex on discharge.    Diet: Regular Diet Adult    Guadarrama Catheter: not present    DVT Prophylaxis: Enoxaparin (Lovenox) SQ  Code Status: Full Code - mentation improved, confirms full code    Expected discharge would be 8/20/2020 once sensitivities are available regarding her urine culture.    Beth Garcia MD  Hospitalist Service  [facility]    ______________________________________________________________________    Interval History   Patient feels better, she thinks that her symptoms have improved, she is alert oriented x3, we discussed about her urine culture results, I had contacted the infectious disease lab at the [facility] due to delay in results from her urine culture, it seems that they received her culture only on the 17th and that they were able to put out typing of the bacteria, it seems it is gram-positive cocci possibly regarding UTI it could only be staph saprophyticus, Staphylococcus aureus or enterococcus, if it is enterococcus I would rather know the sensitivities prior to discharging her, changed antibiotics from Rocephin to Augmentin today.  This was discussed with the patient.    Data reviewed today: I reviewed all medications, new labs and imaging results over the last 24 hours. I personally reviewed no images or EKG's today.    Physical Exam   Vital Signs: Temp: 99.1  F (37.3  C) Temp src: Oral BP: (!) 155/76 Pulse: 90 RETIRE: Heart Rate: 74 Resp: 18 SpO2: 93 % O2 Device: None (Room air)    Weight: 141 lbs 0 oz  Constitutional: Awake, alert, cooperative, no apparent distress  Respiratory: Clear to auscultation bilaterally, no crackles or wheezing  Cardiovascular: Regular rate and rhythm, normal S1 and S2, and no murmur noted  GI: Normal bowel sounds, soft, non-distended,  non-tender  Skin/Integumen: No rashes, no cyanosis  Neuro : Bilateral lower extremity weakness noted, contractures noted lower extremity, trace edema present.      Medications     lactated ringers Stopped (08/18/20 6757)       amoxicillin-clavulanate  1 tablet Oral Q12H JEFF     baclofen  15 mg Oral 4x Daily     enoxaparin ANTICOAGULANT  40 mg Subcutaneous Q24H     sodium chloride (PF)  3 mL Intracatheter Q8H       Data   Recent Labs   Lab 08/18/20  0725 08/17/20  1533 08/17/20  0651 08/16/20  2306   WBC  --   --  6.7 8.9   HGB  --   --  11.5* 12.5   MCV  --   --  96 96   PLT  --  239 264 326     --  138 134   POTASSIUM 3.0*  --  3.8 3.9   CHLORIDE 107  --  105 100   CO2 29  --  29 28   BUN 10  --  15 16   CR 0.63 0.68 0.78 1.06*   ANIONGAP 4  --  4 6   KIMBER 8.3*  --  8.3* 9.0   GLC 84  --  107* 161*   ALBUMIN  --   --  3.3* 3.8   PROTTOTAL  --   --  6.6* 7.5   BILITOTAL  --   --  0.3 0.5   ALKPHOS  --   --  72 80   ALT  --   --  41 54*   AST  --   --  50* 117*   TROPI  --   --   --  <0.015       Imaging:  No results found for this or any previous visit (from the past 24 hour(s)).

## 2020-08-20 VITALS
DIASTOLIC BLOOD PRESSURE: 77 MMHG | HEIGHT: 62 IN | HEART RATE: 79 BPM | TEMPERATURE: 99.2 F | OXYGEN SATURATION: 91 % | BODY MASS INDEX: 25.43 KG/M2 | SYSTOLIC BLOOD PRESSURE: 158 MMHG | RESPIRATION RATE: 18 BRPM | WEIGHT: 138.2 LBS

## 2020-08-20 LAB
ALBUMIN SERPL-MCNC: 3 G/DL (ref 3.4–5)
ALP SERPL-CCNC: 66 U/L (ref 40–150)
ALT SERPL W P-5'-P-CCNC: 37 U/L (ref 0–50)
ANION GAP SERPL CALCULATED.3IONS-SCNC: 4 MMOL/L (ref 3–14)
AST SERPL W P-5'-P-CCNC: 27 U/L (ref 0–45)
BACTERIA SPEC CULT: ABNORMAL
BILIRUB SERPL-MCNC: 0.5 MG/DL (ref 0.2–1.3)
BUN SERPL-MCNC: 13 MG/DL (ref 7–30)
CALCIUM SERPL-MCNC: 8.6 MG/DL (ref 8.5–10.1)
CHLORIDE SERPL-SCNC: 106 MMOL/L (ref 94–109)
CO2 SERPL-SCNC: 29 MMOL/L (ref 20–32)
CREAT SERPL-MCNC: 0.54 MG/DL (ref 0.52–1.04)
ERYTHROCYTE [DISTWIDTH] IN BLOOD BY AUTOMATED COUNT: 13 % (ref 10–15)
GFR SERPL CREATININE-BSD FRML MDRD: >90 ML/MIN/{1.73_M2}
GLUCOSE SERPL-MCNC: 89 MG/DL (ref 70–99)
HCT VFR BLD AUTO: 34.4 % (ref 35–47)
HGB BLD-MCNC: 11.1 G/DL (ref 11.7–15.7)
Lab: ABNORMAL
MCH RBC QN AUTO: 30.9 PG (ref 26.5–33)
MCHC RBC AUTO-ENTMCNC: 32.3 G/DL (ref 31.5–36.5)
MCV RBC AUTO: 96 FL (ref 78–100)
PLATELET # BLD AUTO: 341 10E9/L (ref 150–450)
POTASSIUM SERPL-SCNC: 3.5 MMOL/L (ref 3.4–5.3)
PROT SERPL-MCNC: 6.7 G/DL (ref 6.8–8.8)
RBC # BLD AUTO: 3.59 10E12/L (ref 3.8–5.2)
SODIUM SERPL-SCNC: 139 MMOL/L (ref 133–144)
SPECIMEN SOURCE: ABNORMAL
WBC # BLD AUTO: 3.8 10E9/L (ref 4–11)

## 2020-08-20 PROCEDURE — 36415 COLL VENOUS BLD VENIPUNCTURE: CPT | Performed by: INTERNAL MEDICINE

## 2020-08-20 PROCEDURE — 25000132 ZZH RX MED GY IP 250 OP 250 PS 637: Performed by: PHYSICIAN ASSISTANT

## 2020-08-20 PROCEDURE — 80048 BASIC METABOLIC PNL TOTAL CA: CPT | Performed by: INTERNAL MEDICINE

## 2020-08-20 PROCEDURE — 25000132 ZZH RX MED GY IP 250 OP 250 PS 637: Performed by: INTERNAL MEDICINE

## 2020-08-20 PROCEDURE — 85027 COMPLETE CBC AUTOMATED: CPT | Performed by: INTERNAL MEDICINE

## 2020-08-20 PROCEDURE — 80053 COMPREHEN METABOLIC PANEL: CPT | Performed by: INTERNAL MEDICINE

## 2020-08-20 PROCEDURE — 99238 HOSP IP/OBS DSCHRG MGMT 30/<: CPT | Performed by: INTERNAL MEDICINE

## 2020-08-20 RX ADMIN — AMOXICILLIN AND CLAVULANATE POTASSIUM 1 TABLET: 875; 125 TABLET, FILM COATED ORAL at 09:08

## 2020-08-20 RX ADMIN — BACLOFEN 15 MG: 10 TABLET ORAL at 09:09

## 2020-08-20 RX ADMIN — ANASTROZOLE 1 MG: 1 TABLET, COATED ORAL at 09:08

## 2020-08-20 RX ADMIN — SERTRALINE HYDROCHLORIDE 25 MG: 25 TABLET, FILM COATED ORAL at 09:08

## 2020-08-20 RX ADMIN — BACLOFEN 15 MG: 10 TABLET ORAL at 12:51

## 2020-08-20 RX ADMIN — AMOXICILLIN AND CLAVULANATE POTASSIUM 1 TABLET: 875; 125 TABLET, FILM COATED ORAL at 00:17

## 2020-08-20 ASSESSMENT — ACTIVITIES OF DAILY LIVING (ADL)
ADLS_ACUITY_SCORE: 32

## 2020-08-20 ASSESSMENT — MIFFLIN-ST. JEOR: SCORE: 1100.12

## 2020-08-20 NOTE — PROGRESS NOTES
Anticipate discharge today. Will go home w/ HHC RN/PT via [facility]. Patient declines Zoe at this time. She has info on it- will go thru PCP in future if she decides to get one.  Referral sent to [facility]    Wheelchair ride set up via [employer] -[phone] @ 0028 today

## 2020-08-20 NOTE — DISCHARGE SUMMARY
[facility]    Discharge Summary  Hospitalist    Date of Admission:  8/16/2020  Date of Discharge:  8/20/2020  Discharging Provider: Robin Christensen MD  Date of Service (when I saw the patient): 08/20/20    Discharge Diagnoses   Urinary tract infection   Acute infectious encephalopathy  Acute kidney injury   Stage 2 decubitus ulcers, POA    History of Present Illness  &Hospital Course   Mariela Allen is a 70 year old female admitted on 8/16/2020. PMHx MS with paraplegia, spasticity, and neurogenic bladder who presented with altered mental status.     Admitted with hypotension (reportedly in 70s with EMS and improved prior to ED arrival), somnolence, and confusion. Possible UTI and/or polypharmacy. UA showing pyuria; last UTI 6 weeks ago requiring abx through [facility]. Also on several sedating medications and reports taking 2 [place] because she ran out of Tramadol.  CXR and head CT without acute pathology. Studies sent.      Urine urine culture is growing gram-positive cocci,Aerococcus urinae.       Chronic MS with paraplegia, neurogenic bladder.  Wheelchair bound and home care in twice daily in AM and PM to assist with daily cares.  -Continue baclofen, will restart some of her PTA medications which include imipramine, Mirapex and Valium.     NGUYEN.  Cr 1.06 (0.59 in 9/2019). Likely prerenal related to hypotension on presentation. Possibly hypovolemic.     Transaminitis. Mild and now resolved. Possibly secondary to dehydration     Stage 2 sacral decubitus ulcers. Present on admission: WOC consulted      Robin Christensen MD, MD    Significant Results and Procedures   See above and below      Pending Results   These results will be followed up by Apurva Jackson   Unresulted Labs Ordered in the Past 30 Days of this Admission     No orders found from 7/17/2020 to 8/17/2020.          Code Status   Full Code       Primary Care Physician   Apurva Jackson    Physical Exam   Temp:  99.2  F (37.3  C) Temp src: Oral BP: (!) 158/77 Pulse: 79   Resp: 18 SpO2: 91 % O2 Device: None (Room air)    Vitals:    08/16/20 2303 08/19/20 0644 08/20/20 0532   Weight: 65 kg (143 lb 4.8 oz) 64 kg (141 lb) 62.7 kg (138 lb 3.2 oz)     Vital Signs with Ranges  Temp:  [98.9  F (37.2  C)-99.2  F (37.3  C)] 99.2  F (37.3  C)  Pulse:  [79-85] 79  Resp:  [18] 18  BP: (130-158)/(48-77) 158/77  SpO2:  [91 %-95 %] 91 %  I/O last 3 completed shifts:  In: 380 [P.O.:380]  Out: 1100 [Urine:1100]    Constitutional: Awake, alert, cooperative, no apparent distress.  HEENT: Moist mucous membranes  Respiratory: Clear to auscultation bilaterally, no crackles or wheezing.  Cardiovascular: Regular rate and rhythm, normal S1 and S2, and no murmur noted.  GI: Soft, non-distended, non-tender, normal bowel sounds.        Discharge Disposition   Discharged to home  Condition at discharge: Stable    Consultations This Hospital Stay   WOUND OSTOMY CONTINENCE NURSE  IP CONSULT  PHYSICAL THERAPY ADULT IP CONSULT  CARE TRANSITION RN/SW IP CONSULT  OCCUPATIONAL THERAPY ADULT IP CONSULT  CARE TRANSITION RN/SW IP CONSULT    Time Spent on this Encounter   I, Robin Christensen MD, personally saw the patient today and spent less than or equal to 30 minutes discharging this patient.    Discharge Orders      Follow-up and recommended labs and tests     Follow up with your primary Neurologist  [facility] [phone]     Reason for your hospital stay    Urinary tract infection     Activity    Your activity upon discharge: activity as tolerated     When to contact your care team    Fever 101 or greater, increased weakness or confusion, dizziness     Full Code     Diet    Follow this diet upon discharge: Orders Placed This Encounter      Regular Diet Adult     Discharge Medications   Current Discharge Medication List      START taking these medications    Details   amoxicillin-clavulanate (AUGMENTIN) 875-125 MG tablet Take 1 tablet by  mouth every 12 hours  Qty: 8 tablet, Refills: 0    Associated Diagnoses: Urinary tract infection without hematuria, site unspecified         CONTINUE these medications which have NOT CHANGED    Details   anastrozole (ARIMIDEX) 1 MG tablet Take 1 mg by mouth daily      baclofen (LIORESAL) 10 MG tablet Take 30 mg by mouth 4 times daily       Calcium Citrate-Vitamin D (CITRACAL + D PO) Take 1 tablet by mouth daily       calcium polycarbophil (FIBERCON) 625 MG tablet Take 1 tablet by mouth 2 times daily      Cholecalciferol 100 MCG (4000 UT) CAPS Take 4,000 Units by mouth daily      cyanocobalamin (VITAMIN B-12) 100 MCG tablet Take 100 mcg by mouth daily      diazepam (VALIUM) 5 MG tablet Take 5 mg by mouth 2 times daily as needed       HYDROcodone-acetaminophen (NORCO) 5-325 MG per tablet Take 1-2 tablets by mouth every 4 hours as needed for other (Moderate to Severe Pain)  Qty: 20 tablet, Refills: 0    Associated Diagnoses: S/P lumpectomy, left breast      imipramine (TOFRANIL) 10 MG tablet Take 10 mg by mouth At Bedtime       pramipexole (MIRAPEX) 0.25 MG tablet Take 0.25 mg by mouth At Bedtime Take 2-3 hours before bedtime      sertraline (ZOLOFT) 25 MG tablet Take 25 mg by mouth daily      traMADol (ULTRAM) 50 MG tablet Take 50 mg by mouth 2 times daily           Allergies   Allergies   Allergen Reactions     Solu-Medrol [Methylprednisolone]      High Dose caused Delirium     Data   Most Recent 3 CBC's:  Recent Labs   Lab Test 08/20/20  0752 08/17/20  1533 08/17/20  0651 08/16/20  2306   WBC 3.8*  --  6.7 8.9   HGB 11.1*  --  11.5* 12.5   MCV 96  --  96 96    239 264 326      Most Recent 3 BMP's:  Recent Labs   Lab Test 08/20/20  0752 08/18/20  0725 08/17/20  1533 08/17/20  0651    140  --  138   POTASSIUM 3.5 3.0*  --  3.8   CHLORIDE 106 107  --  105   CO2 29 29  --  29   BUN 13 10  --  15   CR 0.54 0.63 0.68 0.78   ANIONGAP 4 4  --  4   KIMBER 8.6 8.3*  --  8.3*   GLC 89 84  --  107*     Most Recent 2  LFT's:  Recent Labs   Lab Test 08/20/20  0752 08/17/20  0651   AST 27 50*   ALT 37 41   ALKPHOS 66 72   BILITOTAL 0.5 0.3     Most Recent INR's and Anticoagulation Dosing History:  Anticoagulation Dose History     There is no flowsheet data to display.        Most Recent 3 Troponin's:  Recent Labs   Lab Test 08/16/20  2306   TROPI <0.015     Most Recent Cholesterol Panel:No lab results found.  Most Recent 6 Bacteria Isolates From Any Culture (See EPIC Reports for Culture Details):  Recent Labs   Lab Test 08/16/20  2314 12/02/16  1345   CULT >100,000 colonies/mL  Aerococcus urinae  Identification obtained by MALDI-TOF mass spectrometry research use only database. Test   characteristics determined and verified by the [facility]   ([facility]) [place], [place].  * >100,000 colonies/mL mixed urogenital reena Susceptibility testing not routinely   done       Most Recent TSH, T4 and A1c Labs:No lab results found.  Results for orders placed or performed during the hospital encounter of 08/16/20   CT Head w/o Contrast    Narrative    EXAM: CT HEAD W/O CONTRAST  LOCATION: [facility]  DATE/TIME: 8/16/2020 11:41 PM    INDICATION: Acute mental status and hypotension. Somnolent.  COMPARISON: None.  TECHNIQUE: Routine without IV contrast. Multiplanar reformats. Dose reduction techniques were used.    FINDINGS:  INTRACRANIAL CONTENTS: No intracranial hemorrhage, extraaxial collection, or mass effect.  No CT evidence of acute infarct. Normal parenchymal attenuation. Mild generalized volume loss. No hydrocephalus.     VISUALIZED ORBITS/SINUSES/MASTOIDS: No intraorbital abnormality. No paranasal sinus mucosal disease. No middle ear or mastoid effusion.    BONES/SOFT TISSUES: No acute calvarial fracture. Focal scalp subcutaneous stranding at the midline vertex.      Impression    IMPRESSION:  1.  No evidence of an acute intracranial abnormality.  2.  No acute calvarial fracture. Focal scalp  subcutaneous stranding at the midline vertex may reflect contusion or scarring. Correlate with physical exam findings.   XR Chest Port 1 View    Narrative    EXAM: XR CHEST PORT 1 VW  LOCATION: [facility]  DATE/TIME: 8/16/2020 11:49 PM    INDICATION: Altered mental status. Hypotension.  COMPARISON: None.      Impression    IMPRESSION: Shallow inspiration. Mild cardiac enlargement. No focal consolidation or pleural effusion.

## 2020-08-20 NOTE — PLAN OF CARE
[id number]: Pt A&O. VSS on RA, tmax 99.1. Denied pain. PIV SL. Purewick in place, turned/repositioned. On Augmentin. Discharge home with cares pending UC sensitivity results.

## 2020-08-20 NOTE — PROGRESS NOTES
Patient discharged at 2:55 PM to home.  IV was discontinued. Pain at time of discharge was 0. Belongings returned to patient.  Discharge instructions and medications reviewed with patient.  Patient verbalized understanding and all questions were answered.  Prescriptions given to patient.  At time of discharge, patient condition was stable and left the unit escorted by [facility].

## 2020-08-20 NOTE — PLAN OF CARE
Physical Therapy Discharge Summary    Reason for therapy discharge:    Discharged to home with home therapy.    Progress towards therapy goal(s). See goals on Care Plan in [facility] electronic health record for goal details.  Goals partially met.  Barriers to achieving goals:   discharge from facility.    Therapy recommendation(s):    Continued therapy is recommended.  Rationale/Recommendations:  REcommend home PT to maximize mobility and increase strength to baseline.

## 2020-08-20 NOTE — PROGRESS NOTES
[facility]  Called patient over the phone to discuss plans for home care.  Pt to be discharged home today and has agreed to have [facility] provide RN and PT services. Patient care support center processing referral.  Pt verbalized understanding that initial visit is scheduled for 8/21 or 8/22.  Pt has 24 hour phone number for [facility] for any questions or concerns.

## 2021-02-08 ENCOUNTER — TRANSFERRED RECORDS (OUTPATIENT)
Dept: HEALTH INFORMATION MANAGEMENT | Facility: CLINIC | Age: 71
End: 2021-02-08

## 2021-02-11 ENCOUNTER — HOSPITAL ENCOUNTER (OUTPATIENT)
Dept: MAMMOGRAPHY | Facility: CLINIC | Age: 71
Discharge: HOME | End: 2021-02-11
Attending: INTERNAL MEDICINE | Admitting: INTERNAL MEDICINE
Payer: COMMERCIAL

## 2021-02-11 DIAGNOSIS — Z12.31 VISIT FOR SCREENING MAMMOGRAM: ICD-10-CM

## 2021-02-11 PROCEDURE — 77063 BREAST TOMOSYNTHESIS BI: CPT

## 2021-03-14 ENCOUNTER — HEALTH MAINTENANCE LETTER (OUTPATIENT)
Age: 71
End: 2021-03-14

## 2021-03-15 ENCOUNTER — HOSPITAL ENCOUNTER (INPATIENT)
Facility: CLINIC | Age: 71
LOS: 5 days | Discharge: HOME HEALTH | DRG: 689 | End: 2021-03-20
Attending: EMERGENCY MEDICINE | Admitting: INTERNAL MEDICINE
Payer: COMMERCIAL

## 2021-03-15 DIAGNOSIS — I10 BENIGN ESSENTIAL HYPERTENSION: Primary | ICD-10-CM

## 2021-03-15 DIAGNOSIS — N39.0 ACUTE UTI: ICD-10-CM

## 2021-03-15 DIAGNOSIS — R41.0 CONFUSION: ICD-10-CM

## 2021-03-15 DIAGNOSIS — N17.9 ACUTE KIDNEY INJURY (H): ICD-10-CM

## 2021-03-15 DIAGNOSIS — G35 MULTIPLE SCLEROSIS (H): ICD-10-CM

## 2021-03-15 DIAGNOSIS — K59.00 CONSTIPATION, UNSPECIFIED CONSTIPATION TYPE: ICD-10-CM

## 2021-03-15 LAB
ALBUMIN SERPL-MCNC: 3.4 G/DL (ref 3.4–5)
ALBUMIN UR-MCNC: 30 MG/DL
ALP SERPL-CCNC: 66 U/L (ref 40–150)
ALT SERPL W P-5'-P-CCNC: 21 U/L (ref 0–50)
ANION GAP SERPL CALCULATED.3IONS-SCNC: 3 MMOL/L (ref 3–14)
APPEARANCE UR: CLEAR
AST SERPL W P-5'-P-CCNC: 32 U/L (ref 0–45)
BASOPHILS # BLD AUTO: 0 10E9/L (ref 0–0.2)
BASOPHILS NFR BLD AUTO: 0.6 %
BILIRUB SERPL-MCNC: 0.2 MG/DL (ref 0.2–1.3)
BILIRUB UR QL STRIP: NEGATIVE
BUN SERPL-MCNC: 23 MG/DL (ref 7–30)
CALCIUM SERPL-MCNC: 9.5 MG/DL (ref 8.5–10.1)
CHLORIDE SERPL-SCNC: 107 MMOL/L (ref 94–109)
CO2 SERPL-SCNC: 30 MMOL/L (ref 20–32)
COLOR UR AUTO: ABNORMAL
CREAT SERPL-MCNC: 1.73 MG/DL (ref 0.52–1.04)
CREAT UR-MCNC: 79 MG/DL
DIFFERENTIAL METHOD BLD: ABNORMAL
EOSINOPHIL # BLD AUTO: 0.1 10E9/L (ref 0–0.7)
EOSINOPHIL NFR BLD AUTO: 2.1 %
ERYTHROCYTE [DISTWIDTH] IN BLOOD BY AUTOMATED COUNT: 13.9 % (ref 10–15)
FRACT EXCRET NA UR+SERPL-RTO: 0.7 %
GFR SERPL CREATININE-BSD FRML MDRD: 29 ML/MIN/{1.73_M2}
GLUCOSE SERPL-MCNC: 104 MG/DL (ref 70–99)
GLUCOSE UR STRIP-MCNC: NEGATIVE MG/DL
HCT VFR BLD AUTO: 42.4 % (ref 35–47)
HGB BLD-MCNC: 13.6 G/DL (ref 11.7–15.7)
HGB UR QL STRIP: NEGATIVE
HYALINE CASTS #/AREA URNS LPF: 1 /LPF (ref 0–2)
IMM GRANULOCYTES # BLD: 0 10E9/L (ref 0–0.4)
IMM GRANULOCYTES NFR BLD: 0.2 %
INR PPP: 0.92 (ref 0.86–1.14)
INTERPRETATION ECG - MUSE: NORMAL
KETONES UR STRIP-MCNC: NEGATIVE MG/DL
LABORATORY COMMENT REPORT: NORMAL
LACTATE BLD-SCNC: 1.6 MMOL/L (ref 0.7–2)
LEUKOCYTE ESTERASE UR QL STRIP: ABNORMAL
LYMPHOCYTES # BLD AUTO: 0.6 10E9/L (ref 0.8–5.3)
LYMPHOCYTES NFR BLD AUTO: 11.9 %
MCH RBC QN AUTO: 31.1 PG (ref 26.5–33)
MCHC RBC AUTO-ENTMCNC: 32.1 G/DL (ref 31.5–36.5)
MCV RBC AUTO: 97 FL (ref 78–100)
MONOCYTES # BLD AUTO: 0.5 10E9/L (ref 0–1.3)
MONOCYTES NFR BLD AUTO: 9.5 %
MUCOUS THREADS #/AREA URNS LPF: PRESENT /LPF
NEUTROPHILS # BLD AUTO: 4 10E9/L (ref 1.6–8.3)
NEUTROPHILS NFR BLD AUTO: 75.7 %
NITRATE UR QL: NEGATIVE
NRBC # BLD AUTO: 0 10*3/UL
NRBC BLD AUTO-RTO: 0 /100
PH UR STRIP: 5.5 PH (ref 5–7)
PLATELET # BLD AUTO: 286 10E9/L (ref 150–450)
POTASSIUM SERPL-SCNC: 3.8 MMOL/L (ref 3.4–5.3)
PROT SERPL-MCNC: 6.8 G/DL (ref 6.8–8.8)
RBC # BLD AUTO: 4.37 10E12/L (ref 3.8–5.2)
RBC #/AREA URNS AUTO: 2 /HPF (ref 0–2)
SARS-COV-2 RNA RESP QL NAA+PROBE: NEGATIVE
SODIUM SERPL-SCNC: 140 MMOL/L (ref 133–144)
SODIUM UR-SCNC: 47 MMOL/L
SOURCE: ABNORMAL
SP GR UR STRIP: 1.01 (ref 1–1.03)
SPECIMEN SOURCE: NORMAL
SQUAMOUS #/AREA URNS AUTO: 2 /HPF (ref 0–1)
TROPONIN I SERPL-MCNC: <0.015 UG/L (ref 0–0.04)
UROBILINOGEN UR STRIP-MCNC: 0 MG/DL (ref 0–2)
WBC # BLD AUTO: 5.3 10E9/L (ref 4–11)
WBC #/AREA URNS AUTO: 19 /HPF (ref 0–5)

## 2021-03-15 PROCEDURE — 83605 ASSAY OF LACTIC ACID: CPT | Performed by: EMERGENCY MEDICINE

## 2021-03-15 PROCEDURE — 80053 COMPREHEN METABOLIC PANEL: CPT | Performed by: EMERGENCY MEDICINE

## 2021-03-15 PROCEDURE — 93005 ELECTROCARDIOGRAM TRACING: CPT

## 2021-03-15 PROCEDURE — 87635 SARS-COV-2 COVID-19 AMP PRB: CPT | Performed by: EMERGENCY MEDICINE

## 2021-03-15 PROCEDURE — 84484 ASSAY OF TROPONIN QUANT: CPT | Performed by: EMERGENCY MEDICINE

## 2021-03-15 PROCEDURE — 120N000001 HC R&B MED SURG/OB

## 2021-03-15 PROCEDURE — 250N000011 HC RX IP 250 OP 636: Performed by: EMERGENCY MEDICINE

## 2021-03-15 PROCEDURE — 81001 URINALYSIS AUTO W/SCOPE: CPT | Performed by: EMERGENCY MEDICINE

## 2021-03-15 PROCEDURE — 250N000013 HC RX MED GY IP 250 OP 250 PS 637: Performed by: EMERGENCY MEDICINE

## 2021-03-15 PROCEDURE — 84300 ASSAY OF URINE SODIUM: CPT | Performed by: EMERGENCY MEDICINE

## 2021-03-15 PROCEDURE — 250N000013 HC RX MED GY IP 250 OP 250 PS 637: Performed by: INTERNAL MEDICINE

## 2021-03-15 PROCEDURE — 250N000013 HC RX MED GY IP 250 OP 250 PS 637: Performed by: PHYSICIAN ASSISTANT

## 2021-03-15 PROCEDURE — 87040 BLOOD CULTURE FOR BACTERIA: CPT | Performed by: EMERGENCY MEDICINE

## 2021-03-15 PROCEDURE — 258N000003 HC RX IP 258 OP 636: Performed by: EMERGENCY MEDICINE

## 2021-03-15 PROCEDURE — 258N000003 HC RX IP 258 OP 636: Performed by: PHYSICIAN ASSISTANT

## 2021-03-15 PROCEDURE — 99285 EMERGENCY DEPT VISIT HI MDM: CPT | Mod: 25

## 2021-03-15 PROCEDURE — 82570 ASSAY OF URINE CREATININE: CPT | Performed by: EMERGENCY MEDICINE

## 2021-03-15 PROCEDURE — 87086 URINE CULTURE/COLONY COUNT: CPT | Performed by: EMERGENCY MEDICINE

## 2021-03-15 PROCEDURE — 99207 PR APP CREDIT; MD BILLING SHARED VISIT: CPT | Performed by: PHYSICIAN ASSISTANT

## 2021-03-15 PROCEDURE — 99223 1ST HOSP IP/OBS HIGH 75: CPT | Mod: AI | Performed by: INTERNAL MEDICINE

## 2021-03-15 PROCEDURE — 96365 THER/PROPH/DIAG IV INF INIT: CPT

## 2021-03-15 PROCEDURE — 85025 COMPLETE CBC W/AUTO DIFF WBC: CPT | Performed by: EMERGENCY MEDICINE

## 2021-03-15 PROCEDURE — C9803 HOPD COVID-19 SPEC COLLECT: HCPCS

## 2021-03-15 PROCEDURE — 85610 PROTHROMBIN TIME: CPT | Performed by: EMERGENCY MEDICINE

## 2021-03-15 RX ORDER — NALOXONE HYDROCHLORIDE 0.4 MG/ML
0.4 INJECTION, SOLUTION INTRAMUSCULAR; INTRAVENOUS; SUBCUTANEOUS
Status: DISCONTINUED | OUTPATIENT
Start: 2021-03-15 | End: 2021-03-20 | Stop reason: HOSPADM

## 2021-03-15 RX ORDER — NALOXONE HYDROCHLORIDE 0.4 MG/ML
0.2 INJECTION, SOLUTION INTRAMUSCULAR; INTRAVENOUS; SUBCUTANEOUS
Status: DISCONTINUED | OUTPATIENT
Start: 2021-03-15 | End: 2021-03-20 | Stop reason: HOSPADM

## 2021-03-15 RX ORDER — PROCHLORPERAZINE 25 MG
12.5 SUPPOSITORY, RECTAL RECTAL EVERY 12 HOURS PRN
Status: DISCONTINUED | OUTPATIENT
Start: 2021-03-15 | End: 2021-03-20 | Stop reason: HOSPADM

## 2021-03-15 RX ORDER — ACETAMINOPHEN 325 MG/1
650 TABLET ORAL EVERY 4 HOURS PRN
Status: DISCONTINUED | OUTPATIENT
Start: 2021-03-15 | End: 2021-03-20 | Stop reason: HOSPADM

## 2021-03-15 RX ORDER — CEFTRIAXONE 1 G/1
1 INJECTION, POWDER, FOR SOLUTION INTRAMUSCULAR; INTRAVENOUS ONCE
Status: COMPLETED | OUTPATIENT
Start: 2021-03-15 | End: 2021-03-15

## 2021-03-15 RX ORDER — PRAMIPEXOLE DIHYDROCHLORIDE 0.25 MG/1
0.25 TABLET ORAL EVERY EVENING
Status: DISCONTINUED | OUTPATIENT
Start: 2021-03-15 | End: 2021-03-20 | Stop reason: HOSPADM

## 2021-03-15 RX ORDER — HYDROCODONE BITARTRATE AND ACETAMINOPHEN 5; 325 MG/1; MG/1
1-2 TABLET ORAL EVERY 4 HOURS PRN
Status: DISCONTINUED | OUTPATIENT
Start: 2021-03-15 | End: 2021-03-16

## 2021-03-15 RX ORDER — LIDOCAINE 40 MG/G
CREAM TOPICAL
Status: DISCONTINUED | OUTPATIENT
Start: 2021-03-15 | End: 2021-03-20 | Stop reason: HOSPADM

## 2021-03-15 RX ORDER — PROCHLORPERAZINE MALEATE 5 MG
5 TABLET ORAL EVERY 6 HOURS PRN
Status: DISCONTINUED | OUTPATIENT
Start: 2021-03-15 | End: 2021-03-20 | Stop reason: HOSPADM

## 2021-03-15 RX ORDER — ONDANSETRON 2 MG/ML
4 INJECTION INTRAMUSCULAR; INTRAVENOUS EVERY 6 HOURS PRN
Status: DISCONTINUED | OUTPATIENT
Start: 2021-03-15 | End: 2021-03-20 | Stop reason: HOSPADM

## 2021-03-15 RX ORDER — BACLOFEN 10 MG/1
30 TABLET ORAL 4 TIMES DAILY
Status: DISCONTINUED | OUTPATIENT
Start: 2021-03-15 | End: 2021-03-16

## 2021-03-15 RX ORDER — SODIUM CHLORIDE 9 MG/ML
INJECTION, SOLUTION INTRAVENOUS CONTINUOUS
Status: DISCONTINUED | OUTPATIENT
Start: 2021-03-15 | End: 2021-03-15

## 2021-03-15 RX ORDER — CIPROFLOXACIN 500 MG/1
500 TABLET, FILM COATED ORAL 2 TIMES DAILY
Status: ON HOLD | COMMUNITY
End: 2021-03-19

## 2021-03-15 RX ORDER — SODIUM CHLORIDE 9 MG/ML
INJECTION, SOLUTION INTRAVENOUS CONTINUOUS
Status: DISCONTINUED | OUTPATIENT
Start: 2021-03-15 | End: 2021-03-16

## 2021-03-15 RX ORDER — CEFTRIAXONE 1 G/1
1 INJECTION, POWDER, FOR SOLUTION INTRAMUSCULAR; INTRAVENOUS EVERY 24 HOURS
Status: DISCONTINUED | OUTPATIENT
Start: 2021-03-16 | End: 2021-03-16

## 2021-03-15 RX ORDER — ACETAMINOPHEN 650 MG/1
650 SUPPOSITORY RECTAL EVERY 4 HOURS PRN
Status: DISCONTINUED | OUTPATIENT
Start: 2021-03-15 | End: 2021-03-20 | Stop reason: HOSPADM

## 2021-03-15 RX ORDER — SERTRALINE HYDROCHLORIDE 25 MG/1
50 TABLET, FILM COATED ORAL DAILY
Status: DISCONTINUED | OUTPATIENT
Start: 2021-03-15 | End: 2021-03-20 | Stop reason: HOSPADM

## 2021-03-15 RX ORDER — ONDANSETRON 4 MG/1
4 TABLET, ORALLY DISINTEGRATING ORAL EVERY 6 HOURS PRN
Status: DISCONTINUED | OUTPATIENT
Start: 2021-03-15 | End: 2021-03-20 | Stop reason: HOSPADM

## 2021-03-15 RX ADMIN — PRAMIPEXOLE DIHYDROCHLORIDE 0.25 MG: 0.25 TABLET ORAL at 22:10

## 2021-03-15 RX ADMIN — SODIUM CHLORIDE 1000 ML: 9 INJECTION, SOLUTION INTRAVENOUS at 12:48

## 2021-03-15 RX ADMIN — HYDROCODONE BITARTRATE AND ACETAMINOPHEN 1 TABLET: 5; 325 TABLET ORAL at 20:02

## 2021-03-15 RX ADMIN — SERTRALINE HYDROCHLORIDE 50 MG: 25 TABLET ORAL at 19:44

## 2021-03-15 RX ADMIN — CEFTRIAXONE SODIUM 1 G: 1 INJECTION, POWDER, FOR SOLUTION INTRAMUSCULAR; INTRAVENOUS at 12:46

## 2021-03-15 RX ADMIN — BACLOFEN 30 MG: 20 TABLET ORAL at 13:25

## 2021-03-15 RX ADMIN — ACETAMINOPHEN 650 MG: 325 TABLET, FILM COATED ORAL at 19:35

## 2021-03-15 RX ADMIN — BACLOFEN 30 MG: 10 TABLET ORAL at 20:02

## 2021-03-15 RX ADMIN — SODIUM CHLORIDE: 9 INJECTION, SOLUTION INTRAVENOUS at 15:29

## 2021-03-15 ASSESSMENT — ACTIVITIES OF DAILY LIVING (ADL)
DIFFICULTY_EATING/SWALLOWING: NO
DIFFICULTY_COMMUNICATING: NO
ADLS_ACUITY_SCORE: 28
ADLS_ACUITY_SCORE: 29
DRESSING/BATHING: BATHING DIFFICULTY, ASSISTANCE 1 PERSON;DRESSING DIFFICULTY, ASSISTANCE 1 PERSON
TOILETING_ASSISTANCE: TOILETING DIFFICULTY, ASSISTANCE 1 PERSON
TOILETING_ISSUES: YES
EQUIPMENT_CURRENTLY_USED_AT_HOME: WHEELCHAIR, POWER
DRESSING/BATHING_DIFFICULTY: YES

## 2021-03-15 ASSESSMENT — ENCOUNTER SYMPTOMS
ABDOMINAL PAIN: 0
COUGH: 1
DIARRHEA: 1
HEADACHES: 0
FREQUENCY: 1
CONFUSION: 1
NAUSEA: 0
VOMITING: 0

## 2021-03-15 NOTE — PLAN OF CARE
Pt was admitted from ED at 1400, pt is AxOx4 but very easily gets confused, VSS ex) increased BP- but didn't met parameters to notify MD. States chronic pain- takes scheduled meds. IV infusing. WC bound at baseline. Reg diet. neurogenic bladder- can be incontinent . Cr 1.73, UA- abd. UC and BC pending. Right upper arm scab. Left thigh multiple scratches and prevenative meplix placed on coccyx. Await consults from CC/SW, PT, OT. Discharge plan unclear at this time; nursing will continue to monitor.

## 2021-03-15 NOTE — PROGRESS NOTES
RECEIVING UNIT ED HANDOFF REVIEW    ED Nurse Handoff Report was reviewed by: Zoie Torres RN on March 15, 2021 at 1:19 PM

## 2021-03-15 NOTE — ED TRIAGE NOTES
Pt coming from home home health person noticed increase confusion this morning   Pt being treated for UTI - on ? cipro may have missed a few doses taking only 1 instead of twice a day

## 2021-03-15 NOTE — ED NOTES
[facility]  ED Nurse Handoff Report    ED Chief complaint: Altered Mental Status      ED Diagnosis:   Final diagnoses:   Acute UTI   Confusion   Acute kidney injury (H)       Code Status: Full Code    Allergies:   Allergies   Allergen Reactions     Solu-Medrol [Methylprednisolone]      High Dose caused Delirium       Patient Story: Patient comes from home after calling 911. Caregiver told EMS the patient is being treated for a UTI but may have missed some doses of medications. Patient arrived vitally stable, confused on date and time. Has MS and is in a wheelchair fulltime. Can pivot with assistance of 1.    Focused Assessment:    Neuro: Alert and oriented to self, situation. W/C bound at baseline due to MS.   Cards: WDL   Pulm: WDL on room air  GI: WDL   : Incontinent     Treatments and/or interventions provided: fluids, meds  Patient's response to treatments and/or interventions:     To be done/followed up on inpatient unit:      Does this patient have any cognitive concerns?: Disoriented to time    Activity level - Baseline/Home:  Wheelchair and Total Care  Activity Level - Current:   Total Care    Patient's Preferred language: English   Needed?: No    Isolation: None  Infection: Not Applicable  Patient tested for COVID 19 prior to admission: YES  Bariatric?: No    Vital Signs:   Vitals:    03/15/21 1118 03/15/21 1200   BP: (!) 161/78 (!) 167/80   Pulse: 72 64   Resp: 12    Temp: 97.7  F (36.5  C)    TempSrc: Temporal    SpO2: 97% 95%       Cardiac Rhythm:     Was the PSS-3 completed:   Yes  What interventions are required if any?               Family Comments:  at home   OBS brochure/video discussed/provided to patient/family: N/A              Name of person given brochure if not patient:               Relationship to patient:     For the majority of the shift this patient's behavior was Green.   Behavioral interventions performed were none.    ED NURSE PHONE NUMBER: *[phone]  [id number]

## 2021-03-15 NOTE — ED NOTES
Bed: ED23  Expected date:   Expected time:   Means of arrival:   Comments:  RICHAR - 70 f CONFUSION UTI ETA 1108

## 2021-03-15 NOTE — H&P
[facility]    History and Physical - Hospitalist Service       Date of Admission:  3/15/2021    Assessment & Plan   Mariela Allen is a 70 year old female with a past medical history of multiple sclerosis with tetraplegia and spasticity who presented to the emergency department for evaluation of altered mental status in the context of recent diagnosis of E. coli UTI.  She is being admitted for further evaluation.    E. coli UTI: Per care everywhere patient developed UTI symptoms last week.  Urine analysis through urologist grossly abnormal.  She was started on Cipro twice daily.  Culture subsequently grew pansensitive E. coli > 100,000 colonies.  Patient indicated in the emergency department she was confused and only taking the medication once per day.  Afebrile with stable vital signs in the emergency department  -IV ceftriaxone  -Repeat cultures pending    Acute renal failure: Baseline creatinine 0.7-0.8.  Current creatinine elevated at 1.73.  Sol 0.7.  Suspect prerenal in the setting of diminished oral intake due to UTI  -IV fluids  -Avoid nephrotoxic medications  -Follow BMP  - Monitor PVRs in the setting of neurogenic bladder    Acute metabolic encephalopathy: Suspect multifactorial in the setting of UTI, acute renal failure, MS and multiple sedating medications.  She appears nonfocal on exam.  Additionally she indicates in the emergency department she lives with her  who drinks.  Denies issues with safety at home.  It appears during previous hospitalization in 2016 vulnerable adult was filed.  -Treat UTI and acute renal failure as above  -We will hold possibly contributing medications including baclofen, Norco, Valium and tramadol.  Resume as indicated for symptoms of spasticity or pain as well as improvement in altered mental status. Consider resumption of baclofen at reduced dose tomorrow.  Discussed with pharmacy, patient indicates she is no longer taking imipramine.  Pharmacy indicates no recent refills.   -Physical therapy and occupational therapy consult  -Social work consult due to concern for possible vulnerable adult    Multiple sclerosis with tetraplegia  Spacticity  Neurogenic Bladder: Follows at [facility], [facility].  Maintained on a regimen of baclofen, Valium, Norco, tramadol and imipramine.   -Hold possible sedating medications for now.  Can resume as indicated  -Monitor PVRs for signs of urinary retention.  Patient does not self cath or require Guadarrama catheter at baseline  -PT/OT and social work consult    Hypertension: SBPs above goal in the ER.  Previously maintained on HCTZ, though unclear if currently taking  - Hold HCTZ for now given ARF  - Hydralazine prn    Depression:   -Resume Zolft when verified  - Hold imipramine for now    BRITTANY  - Non complaint with CPAP    H/o Breast cancer s/p lumpectomy and radiation (2018)  - Hold Arimidex for now, resume on discharge       Diet: Regular  DVT Prophylaxis: Pneumatic Compression Devices  Guadarrama Catheter: not present  Code Status:   Full         Disposition Plan   Expected discharge: Admit inpatient  Entered: Zoie Arrington PA-C 03/15/2021, 1:24 PM     The patient's care was discussed with the Patient.    Zoie Arrington PA-C  [facility]  Contact information available via [facility] Paging/Directory      ______________________________________________________________________    Chief Complaint   Altered Mental Status    History is obtained from the patient, though is limited to AMS    History of Present Illness   Mariela Allen is a 70 year old female with a past medical history of MS with tetraplegia and spasticity who was brought to the emergency department by EMS for evaluation of altered mental status.  She is a history of neurogenic bladder and is followed by urology at [facility].  In review of care everywhere she called the clinic 3/8 complaining of UTI symptoms.   Urine analysis grossly abnormal.  Urine culture grew >100k pansensitive E. coli.  She was placed on a course of ciprofloxacin though apparently she misunderstood the instructions and for at least the first couple days was only taking 1 tablet/day.  She has PCA services that help in the morning and in the evening.  Today her home PCA noted her to be more confused than baseline and called EMS.    In the emergency department she was evaluated by Dr. Cast.  Vitals were stable upon arrival.  Laboratory evaluation with acute renal failure but no leukocytosis.  She was started on ceftriaxone and admitted for further evaluation.    Presently, patient is evaluated in the emergency department.  She indicates she feels better than when she came in.  She tells me she is less confused but still struggles to provide adequate history.  She cannot describe the symptoms she had that prompted her evaluation by urology for UTI.  She is not sure how frequently she has been taking her ciprofloxacin.  She has had no fevers or chills.  Thinks she has been eating okay but admits she does not drink enough fluids.  She has a history of neurogenic bladder and does have issues with incontinence but does not use any form of catheterization.  She is wheelchair-bound at baseline.  ED provider expressed concerned as the patient had indicated her spouse who is her primary caretaker has issues with alcohol dependence.  Does indicate that her  drinks daily and was hospitalized in recent weeks though does not know the clinical history.  She denies concerns for her safety at home.    Review of Systems    The 10 point Review of Systems is negative other than noted in the HPI or here.     Past Medical History    I have reviewed this patient's medical history and updated it with pertinent information if needed.   Past Medical History:   Diagnosis Date     Breast cancer (H)     Invasive ductal carcinoma of Left brst     Cancer (H)     [place]  cell Skin cancer      Chronic pain      Clostridium difficile colitis 2011     Hyperlipidemia      Hypertension      Multiple sclerosis (H)      Nerve pain      Neurogenic bladder      BRITTANY (obstructive sleep apnea)      Recurrent UTI        Past Surgical History   I have reviewed this patient's surgical history and updated it with pertinent information if needed.  Past Surgical History:   Procedure Laterality Date     BIOPSY NODE SENTINEL Left 8/15/2018    Procedure: BIOPSY NODE SENTINEL;;  Surgeon: Mariel Boucher MD;  Location: [facility]     LUMPECTOMY BREAST WITH SEED LOCALIZATION Left 8/15/2018    Procedure: LUMPECTOMY BREAST WITH SEED LOCALIZATION;  LEFT SEED LOCALIZED BREAST LUMPECTOMY WITH SENTINEL NODE BIOPSY ;  Surgeon: Mariel Boucher MD;  Location: [facility]     ORTHOPEDIC SURGERY      ankle       Social History   I have reviewed this patient's social history and updated it with pertinent information if needed.  Social History     Tobacco Use     Smoking status: Never Smoker     Smokeless tobacco: Never Used   Substance Use Topics     Alcohol use: Yes     Comment: drinks wine almost daily      Drug use: Not on file     Lives with  and received PCA services. No children. Has 2 sisters but not close to them per reprot    Family History     Mother had ovarian cancer    Prior to Admission Medications   Prior to Admission Medications   Prescriptions Last Dose Informant Patient Reported? Taking?   Calcium Citrate-Vitamin D (CITRACAL + D PO)  Self Yes No   Sig: Take 1 tablet by mouth daily    Cholecalciferol 100 MCG (4000 UT) CAPS  Self Yes No   Sig: Take 4,000 Units by mouth daily   HYDROcodone-acetaminophen (NORCO) 5-325 MG per tablet  Self No No   Sig: Take 1-2 tablets by mouth every 4 hours as needed for other (Moderate to Severe Pain)   amoxicillin-clavulanate (AUGMENTIN) 875-125 MG tablet   No No   Sig: Take 1 tablet by mouth every 12 hours   anastrozole (ARIMIDEX) 1 MG tablet  Self Yes No   Sig: Take 1  mg by mouth daily   baclofen (LIORESAL) 10 MG tablet  Self Yes No   Sig: Take 30 mg by mouth 4 times daily    calcium polycarbophil (FIBERCON) 625 MG tablet  Self Yes No   Sig: Take 1 tablet by mouth 2 times daily   cyanocobalamin (VITAMIN B-12) 100 MCG tablet  Self Yes No   Sig: Take 100 mcg by mouth daily   diazepam (VALIUM) 5 MG tablet  Self Yes No   Sig: Take 5 mg by mouth 2 times daily as needed    imipramine (TOFRANIL) 10 MG tablet  Self Yes No   Sig: Take 10 mg by mouth At Bedtime    pramipexole (MIRAPEX) 0.25 MG tablet  Self Yes No   Sig: Take 0.25 mg by mouth At Bedtime Take 2-3 hours before bedtime   sertraline (ZOLOFT) 25 MG tablet  Self Yes No   Sig: Take 25 mg by mouth daily   traMADol (ULTRAM) 50 MG tablet  Self Yes No   Sig: Take 50 mg by mouth 2 times daily      Facility-Administered Medications: None     Allergies   Allergies   Allergen Reactions     Solu-Medrol [Methylprednisolone]      High Dose caused Delirium       Physical Exam   Vital Signs: Temp: 97.7  F (36.5  C) Temp src: Temporal BP: (!) 167/80 Pulse: 64   Resp: 12 SpO2: 95 % O2 Device: None (Room air)    Weight: 0 lbs 0 oz  Physical Exam  Constitutional:       General: She is not in acute distress.     Appearance: Normal appearance. She is not toxic-appearing.   HENT:      Head: Normocephalic and atraumatic.      Mouth/Throat:      Comments: Mask in place  Eyes:      General: No scleral icterus.     Pupils: Pupils are equal, round, and reactive to light.   Cardiovascular:      Rate and Rhythm: Normal rate and regular rhythm.      Heart sounds: No murmur.   Pulmonary:      Effort: Pulmonary effort is normal.      Breath sounds: No wheezing.   Abdominal:      General: Abdomen is flat.      Tenderness: There is no abdominal tenderness.   Musculoskeletal: Normal range of motion.      Right lower leg: No edema.      Left lower leg: No edema.      Comments: Contractures of bilateral hands   Skin:     General: Skin is warm and dry.       Findings: No rash.   Neurological:      General: No focal deficit present.      Mental Status: She is alert.      Cranial Nerves: No cranial nerve deficit.      Comments: Strength in LE diminished but symmetric. Moves all 4 extremities. Contractures in bilateral hands. Alert to place, year. Initially said it was May but corrected to march         Data   Data reviewed today: I reviewed all medications, new labs and imaging results over the last 24 hours. I personally reviewed the EKG tracing showing no significant ST changes.    Recent Labs   Lab 03/15/21  1120   WBC 5.3   HGB 13.6   MCV 97      INR 0.92      POTASSIUM 3.8   CHLORIDE 107   CO2 30   BUN 23   CR 1.73*   ANIONGAP 3   KIMBER 9.5   *   ALBUMIN 3.4   PROTTOTAL 6.8   BILITOTAL 0.2   ALKPHOS 66   ALT 21   AST 32   TROPI <0.015     No results found for this or any previous visit (from the past 24 hour(s)).

## 2021-03-15 NOTE — PROVIDER NOTIFICATION
MD Notification    Person notified: hospitalist (admitting) PA    Person Name: PHI Roy    Date/Time: 3/15/2021 at 1616    Interaction: web-based page    Purpose of Notification: Looks like patients home med rec is done, would you like to order her home medications?    Orders Received:    Comments:

## 2021-03-15 NOTE — ED PROVIDER NOTES
History   Chief Complaint:  Altered Mental Status       History limited by: confusion.      Mariela Allen is a 70 year old female with history of MS who lives with her  who presents with altered mental status. The patient states that she has been feeling confused since last night. She states that she feels like she is unable to think correctly. The patient is currently on antibiotics for a bladder infection for the past 6 days. However, she notes that she read the instructions incorrectly and only took one dose of the antibiotics per day instead of 2 doses. She has leg pain from her MS but states this is not new and unchanged. She notes having a cough for a while as well as some diarrhea. She has had increased urinary frequency, but she denies any dysuria or urgency. She denies having nausea, vomiting, headache, chest pain, or abdominal pain.     Review of Systems   Respiratory: Positive for cough.    Cardiovascular: Negative for chest pain.   Gastrointestinal: Positive for diarrhea. Negative for abdominal pain, nausea and vomiting.   Genitourinary: Positive for frequency.   Neurological: Negative for headaches.   Psychiatric/Behavioral: Positive for confusion.   ROS is limited secondary to confusion       Allergies:  Solu-Medrol [Methylprednisolone]    Medications:  Cipro  anastrozole   baclofen   diazepam  imipramine   pramipexole  sertraline    Past Medical History:    Breast cancer    Cancer    Chronic pain   Clostridium difficile colitis   Hyperlipidemia   Hypertension   Multiple sclerosis   Nerve pain   Neurogenic bladder   BRITTANY (obstructive sleep apnea)   Recurrent UTI   Tetraparesis     Past Surgical History:    Biopsy node sentinel   Lumpectomy   Breast  Ankle surgery     Family History:    Cancer     Social History:  The patient lives with her .   The patient is .     Physical Exam     Patient Vitals for the past 24 hrs:   BP Temp Temp src Pulse Resp SpO2   03/15/21 1118 (!) 161/78  97.7  F (36.5  C) Temporal 72 12 97 %       Physical Exam  Nursing note and vitals reviewed.  Constitutional:  Awake, alert, cooperative.  HENT:   Nose:    Nose normal.   Mouth/Throat:   Facemask in place.  Eyes:    Conjunctivae normal and EOM are normal.     Pupils are equal, round, and reactive to light.   Neck:    Trachea normal.   Cardiovascular:  Normal rate, regular rhythm, normal heart sounds and normal pulses. No murmur heard.  Pulmonary/Chest:  Effort normal and breath sounds normal.   Abdominal:   Soft. Normal appearance and bowel sounds are normal.      There is no tenderness.      There is no rebound and no CVA tenderness.   Musculoskeletal:  Extremities atraumatic x 4.   Lymphadenopathy:  No cervical adenopathy.   Neurological:   Awake, alert, disoriented to the date, minimal strength to the lower extremities, spastic paralysis to some of the fingers of the right hand, strength otherwise normal.      No cranial nerve deficit or sensory deficit. GCS eye subscore is 4. GCS verbal subscore is 5. GCS motor subscore is 6.   Skin:    Skin is intact. No rash noted.   Psychiatric:   Normal mood and affect.     Emergency Department Course     ECG:  ECG taken at 1129, ECG read at 1134  Normal sinus rhythm  Low voltage QRS  Borderline ECG  Rate 71 bpm. KS interval 158 ms. QRS duration 84 ms. QT/QTc 418/454 ms. P-R-T axes 36 -10 24.   No significant change when compared to EKG dated 8/16/20.     Laboratory:  UA with micro: Protein Albumin Urine 30 (A) Leukocyte esterase urine small (A) WBC/HPF 19 (H) Squamous epithelial/HPF urine 2 (H) Mucous urine pesent (A)  o/w wnl/negative        Urine culture: pending     CBC:  WBC 5.3, HGB 13.6, , o/w WNL     INR: 0.92    CMP: Glucose 104 (H), GFR 29 (L), o/w WNL (Creatinine: 1.73 (H))     Lactic Acid whole blood (1120): 1.6      Troponin(1120):  <0.015      Asymptomatic COVID-19 Virus (Coronavirus) by PCR Nasopharyngeal swab: pending      Emergency Department  Course:    Reviewed:  I reviewed the patient's nursing notes, vitals, past medical records, Care Everywhere.     Assessments:  1109  I performed an exam of the patient as documented above.   1217 Patient rechecked and updated.     Consults:   1233 I spoke with Dr. Block of the hospitalist service from [facility] regarding patient's presentation, findings, and plan of care.      Interventions:  1215 Rocephin 1 g IV    Disposition:  Admitted.      Impression & Plan   Covid-19  Mariela Allen was evaluated during a global COVID-19 pandemic, which necessitated consideration that the patient might be at risk for infection with the SARS-CoV-2 virus that causes COVID-19.   Applicable protocols for evaluation were followed during the patient's care.   COVID-19 was considered as part of the patient's evaluation. The plan for testing is:  a test was obtained during this visit.    Medical Decision Making:  This is a 70-year-old female brought in by EMS for further evaluation of confusion.  This is in the setting of a recent UTI diagnosis and apparently not taking the antibiotics appropriately.  She does seem somewhat confused here, especially to the date.  She does not appear septic or toxic though.  I proceeded with the above work-up, and she appears to be somewhat dehydrated with an acute kidney injury as well as an ongoing UTI.  We will be culturing her urine and providing her IV ceftriaxone.  I then spoke with Dr. Block, who will be admitting her to a telemetry bed.        Diagnosis:    ICD-10-CM    1. Acute UTI  N39.0 Blood culture     Blood culture     Asymptomatic SARS-CoV-2 COVID-19 Virus (Coronavirus) by PCR   2. Confusion  R41.0    3. Acute kidney injury (H)  N17.9        Scribe Disclosure:  I, Cortez Tillman, am serving as a scribe at 11:09 AM on 3/15/2021 to document services personally performed by James Cast MD based on my observations and the provider's statements to me.        James Cast,  MD  03/15/21 1509

## 2021-03-15 NOTE — PHARMACY-ADMISSION MEDICATION HISTORY
Pharmacy Medication History  Admission medication history interview status for the 3/15/2021  admission is complete. See EPIC admission navigator for prior to admission medications     Location of Interview: Patient room  Medication history sources: Patient, surescripts    Significant changes made to the medication list:  Removed: imipramine, fibercon per patient no longer taking  Changed sertraline from 25mg to 50mg per pharmacy records     In the past week, patient estimated taking medication this percent of the time: 50-90% due to other  Hasn't been routine about taking vitamins.     Additional medication history information:   Pt was confused about how to take the cipro, was taking once a day until she realized the error then starting taking twice a day    Medication reconciliation completed by provider prior to medication history? No    Time spent in this activity: 15 min    Prior to Admission medications    Medication Sig Last Dose Taking? Auth Provider   anastrozole (ARIMIDEX) 1 MG tablet Take 1 mg by mouth daily 3/14/2021 at Unknown time Yes Unknown, Entered By History   baclofen (LIORESAL) 10 MG tablet Take 30 mg by mouth 4 times daily  3/14/2021 at Unknown time Yes Unknown, Entered By History   Calcium Citrate-Vitamin D (CITRACAL + D PO) Take 1 tablet by mouth daily  Past Month at Unknown time Yes Reported, Patient   Cholecalciferol 100 MCG (4000 UT) CAPS Take 4,000 Units by mouth daily Past Month at Unknown time Yes Unknown, Entered By History   cyanocobalamin (VITAMIN B-12) 100 MCG tablet Take 100 mcg by mouth daily Past Month at Unknown time Yes Unknown, Entered By History   pramipexole (MIRAPEX) 0.25 MG tablet Take 0.25 mg by mouth At Bedtime Take 2-3 hours before bedtime 3/14/2021 at Unknown time Yes Unknown, Entered By History   sertraline (ZOLOFT) 25 MG tablet Take 50 mg by mouth daily  3/14/2021 at Unknown time Yes Unknown, Entered By History   traMADol (ULTRAM) 50 MG tablet Take 50 mg by mouth 2  times daily 3/14/2021 at Unknown time Yes Unknown, Entered By History   diazepam (VALIUM) 5 MG tablet Take 5 mg by mouth 2 times daily as needed  prn  Unknown, Entered By History   HYDROcodone-acetaminophen (NORCO) 5-325 MG per tablet Take 1-2 tablets by mouth every 4 hours as needed for other (Moderate to Severe Pain) prn  Patricia Cooper, DO Frances Lobo, PharmD     The information provided in this note is only as accurate as the sources available at the time of update(s)

## 2021-03-16 ENCOUNTER — HOSPITAL ENCOUNTER (OUTPATIENT)
Dept: NEUROLOGY | Facility: CLINIC | Age: 71
DRG: 689 | End: 2021-03-16
Attending: PSYCHIATRY & NEUROLOGY
Payer: COMMERCIAL

## 2021-03-16 ENCOUNTER — APPOINTMENT (OUTPATIENT)
Dept: PHYSICAL THERAPY | Facility: CLINIC | Age: 71
DRG: 689 | End: 2021-03-16
Attending: PHYSICIAN ASSISTANT
Payer: COMMERCIAL

## 2021-03-16 LAB
ANION GAP SERPL CALCULATED.3IONS-SCNC: 7 MMOL/L (ref 3–14)
BACTERIA SPEC CULT: NO GROWTH
BUN SERPL-MCNC: 21 MG/DL (ref 7–30)
CALCIUM SERPL-MCNC: 8.7 MG/DL (ref 8.5–10.1)
CHLORIDE SERPL-SCNC: 113 MMOL/L (ref 94–109)
CO2 SERPL-SCNC: 25 MMOL/L (ref 20–32)
CREAT SERPL-MCNC: 1.53 MG/DL (ref 0.52–1.04)
ERYTHROCYTE [DISTWIDTH] IN BLOOD BY AUTOMATED COUNT: 13.8 % (ref 10–15)
GFR SERPL CREATININE-BSD FRML MDRD: 34 ML/MIN/{1.73_M2}
GLUCOSE SERPL-MCNC: 127 MG/DL (ref 70–99)
HCT VFR BLD AUTO: 36.6 % (ref 35–47)
HGB BLD-MCNC: 11.9 G/DL (ref 11.7–15.7)
Lab: NORMAL
MCH RBC QN AUTO: 31 PG (ref 26.5–33)
MCHC RBC AUTO-ENTMCNC: 32.5 G/DL (ref 31.5–36.5)
MCV RBC AUTO: 95 FL (ref 78–100)
PLATELET # BLD AUTO: 283 10E9/L (ref 150–450)
POTASSIUM SERPL-SCNC: 3.5 MMOL/L (ref 3.4–5.3)
RBC # BLD AUTO: 3.84 10E12/L (ref 3.8–5.2)
SODIUM SERPL-SCNC: 145 MMOL/L (ref 133–144)
SPECIMEN SOURCE: NORMAL
WBC # BLD AUTO: 5.5 10E9/L (ref 4–11)

## 2021-03-16 PROCEDURE — 95816 EEG AWAKE AND DROWSY: CPT

## 2021-03-16 PROCEDURE — 250N000013 HC RX MED GY IP 250 OP 250 PS 637: Performed by: PHYSICIAN ASSISTANT

## 2021-03-16 PROCEDURE — 250N000011 HC RX IP 250 OP 636: Performed by: PHYSICIAN ASSISTANT

## 2021-03-16 PROCEDURE — 80048 BASIC METABOLIC PNL TOTAL CA: CPT | Performed by: PHYSICIAN ASSISTANT

## 2021-03-16 PROCEDURE — 250N000011 HC RX IP 250 OP 636: Performed by: INTERNAL MEDICINE

## 2021-03-16 PROCEDURE — 258N000003 HC RX IP 258 OP 636: Performed by: PSYCHIATRY & NEUROLOGY

## 2021-03-16 PROCEDURE — 99207 PR APP CREDIT; MD BILLING SHARED VISIT: CPT | Performed by: PHYSICIAN ASSISTANT

## 2021-03-16 PROCEDURE — 36415 COLL VENOUS BLD VENIPUNCTURE: CPT | Performed by: PHYSICIAN ASSISTANT

## 2021-03-16 PROCEDURE — 85027 COMPLETE CBC AUTOMATED: CPT | Performed by: PHYSICIAN ASSISTANT

## 2021-03-16 PROCEDURE — 258N000003 HC RX IP 258 OP 636: Performed by: PHYSICIAN ASSISTANT

## 2021-03-16 PROCEDURE — 97110 THERAPEUTIC EXERCISES: CPT | Mod: GP

## 2021-03-16 PROCEDURE — 97530 THERAPEUTIC ACTIVITIES: CPT | Mod: GP

## 2021-03-16 PROCEDURE — 250N000013 HC RX MED GY IP 250 OP 250 PS 637: Performed by: INTERNAL MEDICINE

## 2021-03-16 PROCEDURE — 250N000011 HC RX IP 250 OP 636: Performed by: PSYCHIATRY & NEUROLOGY

## 2021-03-16 PROCEDURE — 97162 PT EVAL MOD COMPLEX 30 MIN: CPT | Mod: GP

## 2021-03-16 PROCEDURE — 99233 SBSQ HOSP IP/OBS HIGH 50: CPT | Performed by: INTERNAL MEDICINE

## 2021-03-16 PROCEDURE — 258N000002 HC RX IP 258 OP 250: Performed by: PHYSICIAN ASSISTANT

## 2021-03-16 PROCEDURE — 120N000001 HC R&B MED SURG/OB

## 2021-03-16 RX ORDER — TRAMADOL HYDROCHLORIDE 50 MG/1
50 TABLET ORAL 2 TIMES DAILY
Status: DISCONTINUED | OUTPATIENT
Start: 2021-03-16 | End: 2021-03-16

## 2021-03-16 RX ORDER — SODIUM CHLORIDE 450 MG/100ML
INJECTION, SOLUTION INTRAVENOUS CONTINUOUS
Status: DISCONTINUED | OUTPATIENT
Start: 2021-03-16 | End: 2021-03-17

## 2021-03-16 RX ORDER — CEFTRIAXONE 2 G/1
2 INJECTION, POWDER, FOR SOLUTION INTRAMUSCULAR; INTRAVENOUS EVERY 24 HOURS
Status: DISCONTINUED | OUTPATIENT
Start: 2021-03-16 | End: 2021-03-18

## 2021-03-16 RX ORDER — TRAMADOL HYDROCHLORIDE 50 MG/1
50 TABLET ORAL 2 TIMES DAILY PRN
Status: DISCONTINUED | OUTPATIENT
Start: 2021-03-16 | End: 2021-03-20 | Stop reason: HOSPADM

## 2021-03-16 RX ORDER — LANOLIN ALCOHOL/MO/W.PET/CERES
100 CREAM (GRAM) TOPICAL DAILY
Status: DISCONTINUED | OUTPATIENT
Start: 2021-03-24 | End: 2021-03-20 | Stop reason: HOSPADM

## 2021-03-16 RX ORDER — DIAZEPAM 10 MG/2ML
2.5 INJECTION, SOLUTION INTRAMUSCULAR; INTRAVENOUS ONCE
Status: COMPLETED | OUTPATIENT
Start: 2021-03-16 | End: 2021-03-16

## 2021-03-16 RX ORDER — DIAZEPAM 10 MG/2ML
5 INJECTION, SOLUTION INTRAMUSCULAR; INTRAVENOUS ONCE
Status: DISCONTINUED | OUTPATIENT
Start: 2021-03-16 | End: 2021-03-16

## 2021-03-16 RX ORDER — AMLODIPINE BESYLATE 5 MG/1
5 TABLET ORAL DAILY
Status: DISCONTINUED | OUTPATIENT
Start: 2021-03-16 | End: 2021-03-17

## 2021-03-16 RX ADMIN — ENOXAPARIN SODIUM 30 MG: 30 INJECTION SUBCUTANEOUS at 15:48

## 2021-03-16 RX ADMIN — THIAMINE HYDROCHLORIDE 500 MG: 100 INJECTION, SOLUTION INTRAMUSCULAR; INTRAVENOUS at 21:01

## 2021-03-16 RX ADMIN — SODIUM CHLORIDE: 4.5 INJECTION, SOLUTION INTRAVENOUS at 21:03

## 2021-03-16 RX ADMIN — SODIUM CHLORIDE: 9 INJECTION, SOLUTION INTRAVENOUS at 01:32

## 2021-03-16 RX ADMIN — HYDROCODONE BITARTRATE AND ACETAMINOPHEN 1 TABLET: 5; 325 TABLET ORAL at 05:50

## 2021-03-16 RX ADMIN — THIAMINE HYDROCHLORIDE 500 MG: 100 INJECTION, SOLUTION INTRAMUSCULAR; INTRAVENOUS at 15:48

## 2021-03-16 RX ADMIN — BACLOFEN 15 MG: 10 TABLET ORAL at 12:05

## 2021-03-16 RX ADMIN — CEFTRIAXONE SODIUM 2 G: 2 INJECTION, POWDER, FOR SOLUTION INTRAMUSCULAR; INTRAVENOUS at 12:05

## 2021-03-16 RX ADMIN — BACLOFEN 15 MG: 10 TABLET ORAL at 17:09

## 2021-03-16 RX ADMIN — ACETAMINOPHEN 650 MG: 325 TABLET, FILM COATED ORAL at 09:00

## 2021-03-16 RX ADMIN — AMLODIPINE BESYLATE 5 MG: 5 TABLET ORAL at 12:05

## 2021-03-16 RX ADMIN — TRAMADOL HYDROCHLORIDE 50 MG: 50 TABLET, FILM COATED ORAL at 09:00

## 2021-03-16 RX ADMIN — BACLOFEN 15 MG: 10 TABLET ORAL at 21:18

## 2021-03-16 RX ADMIN — DIAZEPAM 2.5 MG: 5 INJECTION, SOLUTION INTRAMUSCULAR; INTRAVENOUS at 05:14

## 2021-03-16 RX ADMIN — ACETAMINOPHEN 650 MG: 325 TABLET, FILM COATED ORAL at 15:48

## 2021-03-16 RX ADMIN — SERTRALINE HYDROCHLORIDE 50 MG: 25 TABLET ORAL at 09:00

## 2021-03-16 RX ADMIN — BACLOFEN 15 MG: 10 TABLET ORAL at 10:45

## 2021-03-16 RX ADMIN — PRAMIPEXOLE DIHYDROCHLORIDE 0.25 MG: 0.25 TABLET ORAL at 19:47

## 2021-03-16 RX ADMIN — SODIUM CHLORIDE: 4.5 INJECTION, SOLUTION INTRAVENOUS at 09:00

## 2021-03-16 ASSESSMENT — ACTIVITIES OF DAILY LIVING (ADL)
ADLS_ACUITY_SCORE: 29
ADLS_ACUITY_SCORE: 27
ADLS_ACUITY_SCORE: 29
ADLS_ACUITY_SCORE: 29

## 2021-03-16 NOTE — PROGRESS NOTES
Disoriented to time and situation. Regular diet. VSS ex slight HTN. Tetraplegic, Turn and reposition. Scratches to left thigh. Scab to right upper arm. C/O intense pain, muscles spasming - MD notified PTA medications ordered. Around 0330 patient became more confused, less verbal/unable to communicate and anxious/repetitive, MD notified - believes it could be related to valium withdrawal - Valium ordered.  Incontinent of urine, purewick in place. 2 soft BM. PIV infusing. Continue to monitor.

## 2021-03-16 NOTE — PROGRESS NOTES
[facility]    Medicine Progress Note - Hospitalist Service       Date of Admission:  3/15/2021  Assessment & Plan       Mariela Allen is a 70 year old female with a past medical history of multiple sclerosis with tetraplegia and spasticity who presented to the emergency department for evaluation of altered mental status in the context of recent diagnosis of E. coli UTI.  She is being admitted for further evaluation.    E. coli UTI: Per care everywhere patient developed UTI symptoms last week.  Urine analysis through urologist grossly abnormal.  She was started on Cipro twice daily.  Culture subsequently grew pansensitive E. coli > 100,000 colonies.  Patient indicated in the emergency department she was confused and only taking the medication once per day.  Afebrile with stable vital signs in the emergency department  -IV ceftriaxone  -Repeat cultures pending    Acute renal failure: Baseline creatinine 0.7-0.8.  Current creatinine elevated at 1.73.  FENA 0.7.  Suspect prerenal in the setting of diminished oral intake due to UTI  - Cr trend 1.73--1.53  -IV fluids  -Avoid nephrotoxic medications  -Follow BMP  - Monitor PVRs in the setting of neurogenic bladder    Acute metabolic encephalopathy: Suspect multifactorial in the setting of UTI, acute renal failure, MS and multiple sedating medications.  She appears nonfocal on exam.  Additionally she indicates in the emergency department she lives with her  who drinks.  Denies issues with safety at home.  It appears during previous hospitalization in 2016 vulnerable adult was filed.  Possible contributing medications include baclofen 30 mg 4 times daily, tramadol 50 mg twice daily, Norco as needed, Valium 5 mg twice daily as needed.   -Initially had held PTA sedating medications on admission concern contributing to encephalopathy.  These were resumed yesterday evening due to concerns for worsening pain and spasticity.  She became more  confused overnight and there was concern for possible benzodiazepine withdrawal so was given 2.5 mg of IV Valium.  She appears more encephalopathic today though remains nonfocal.  I reviewed [place] prescription database and she has no recent refills of Norco.  She is only had 2 refills of Valium in the past 12 months so low suspicion for benzodiazepine withdrawal  -Stop as needed Valium and Norco   -Reduce baclofen to 15 mg 4 times daily  -Change tramadol to bid prn  -Treat UTI and acute renal failure as above  -Physical therapy and occupational therapy consult  -Social work consult due to concern for possible vulnerable adult due to reports of ETOH use by  though unable to confirm history at this time    Hypernatremia:   - , change IVF to 1/2 NS  - Daily BMP    Multiple sclerosis with tetraplegia  Spacticity  Neurogenic Bladder: Follows at [facility]  Maintained on a regimen of baclofen, tramadol and prn valium  -Resume baclofen at 50% dose   -Monitor PVRs for signs of urinary retention.  Patient does not self cath or require Guadarrama catheter at baseline  -PT/OT and social work consult    Hypertension: SBPs above goal in the ER.  Previously maintained on HCTZ though not on medications at the time of admission.  - Will start Norvasc 5 mg/d    Depression:   -Continue Zoloft    BRITTANY  - Non complaint with CPAP    H/o Breast cancer s/p lumpectomy and radiation (2018)  - Hold Arimidex for now, resume on discharge         Diet: Combination Diet Regular Diet Adult    DVT Prophylaxis: Pneumatic Compression Devices  Guadarrama Catheter: not present  Code Status: Full Code           Disposition Plan   Expected discharge: Suspect 2+ days    Entered: Zoie Arrington PA-C 03/16/2021, 11:16 AM       The patient's care was discussed with the Bedside Nurse and Patient.  Attempted to call . No answer and his voicemail box is full.     Zoie Arrington PA-C  Hospitalist Service  M  [facility]  Contact information available via [facility] Paging/Directory    ______________________________________________________________________    Interval History   Unable to give history today or complete ROS due to encephalopathy.  Can't tell me the year or month today and could yesterday. Bedside RN agrees she seems more confused today. Labs reassuring. Exam remains non focal      Data reviewed today: I reviewed all medications, new labs and imaging results over the last 24 hours. I personally reviewed no images or EKG's today.    Physical Exam   Vital Signs: Temp: 97.2  F (36.2  C) Temp src: Oral BP: (!) 165/62 Pulse: 83   Resp: 16 SpO2: 95 % O2 Device: None (Room air)    Weight: 0 lbs 0 oz  Physical Exam  Vitals signs and nursing note reviewed.   Constitutional:       Appearance: Normal appearance.   Eyes:      General: No scleral icterus.  Cardiovascular:      Rate and Rhythm: Normal rate and regular rhythm.      Heart sounds: No murmur.   Pulmonary:      Effort: Pulmonary effort is normal.      Breath sounds: No wheezing.   Abdominal:      General: Abdomen is flat.      Palpations: Abdomen is soft.      Tenderness: There is no abdominal tenderness.   Musculoskeletal:      Right lower leg: No edema.      Left lower leg: No edema.      Comments: Contractures of bilateral hands   Skin:     General: Skin is warm and dry.      Findings: No rash.   Neurological:      General: No focal deficit present.      Mental Status: She is alert. She is disoriented.      Cranial Nerves: No cranial nerve deficit.   Psychiatric:         Cognition and Memory: Cognition is impaired.           Data

## 2021-03-16 NOTE — PROGRESS NOTES
Call received about significant pain and not on her home medications.   She is more awake and alert and history of MS.  I will restart her Baclofen and Norco at this time.

## 2021-03-16 NOTE — PLAN OF CARE
OT:Orders received and chart reviewed. Mariela Allen is a 70 year old female with a past medical history of MS with tetraplegia and spasticity who was brought to the emergency department by EMS for evaluation of altered mental status.  She is a history of neurogenic bladder and is followed by urology at [facility].  In review of care everywhere she called the clinic 3/8 complaining of UTI symptoms.  Urine analysis grossly abnormal.      Per chart, pt. is WC bound at baseline, spouse primary caregiver. Will have PT screen for inpatient/acute OT needs, complete or defer evaluation as indicated.

## 2021-03-16 NOTE — PROGRESS NOTES
[facility] EEG #  portable, ordered by Dr. Randall.     Pt is mildly confused, but can follow simple commands.     No activations.       Awake and drowsy

## 2021-03-16 NOTE — CONSULTS
[facility]    Neurology Consultation     Date of Admission:  3/15/2021      Assessment & Plan   Mariela Allen is a 70 year old female with advanced multiple sclerosis, recurrent UTIs, seen by Dr. Rico at [facility], who was admitted on 3/15/2021 with confusion and UTI. I was asked to see the patient for encephalopathy.  --Her presentation is consistent with delirium.      --Encephalopathy/delirium   --Agree with decreasing the baclofen to half dose, benzos as prn, minimize pain meds and narcotics as prns.  --Will get EEG  --Unclear etoh history, will start thiamine wernicke protocol.    --Consider CIWA protocol if signs of withdrawal.  --She had similar presentation with acute encephalopathy and UTI in Aug 2020, encephalopathy resolved with UTI treatment.  --no new focality to her neuro exam, no imaging needed at this time.  --Covid neg    --UTI- being treated with Ceftriaxone.  --NGUYEN- as per hospitalists, improving.      Advanced multiple sclerosis with spasticity and neurogenic bladder, wheelchair bound  --Not on any disease modifying meds at this time  --MS meds as above.   --Sees Dr. Rico at [facility].    --I reviewed Dr. Rico's most recent note.    I discussed the above diagnosis, assessment, and further testing with the patient and her nurse.        Alanis Scales MD  [facility]  Office Phone [phone]            Code Status    Full Code        Reason for Consult   Reason for consult: I was asked by ROBERTA Fine to evaluate this patient for confusion.      Chief Complaint   UTI, does not want to go to the hospital    History is obtained from the patient and the electronic medical chart.    History of Present Illness   Mariela Allen is a 70 year old female with advanced multiple sclerosis, recurrent UTIs, seen by Dr. Rico at [facility],, who presents with confusion, UTI.    In terms of her MS, symptoms began in her 30s.  She is not currently on  any disease modifying agents as they were not working for her.  She has severe quadraparesis at baseline, uses a wheelchair.  Per Dr. Rico's last note, there is concern that patient may need higher level of care than at home, with her  being her primary caretaker.      Mariela does take meds for her MS side effects- baclofen 30 mg qid, mirapex 0.25 ng at bedtime, sertraline 25 mg, tramadol prn, vaium 5 mg prn, norco 5-325 prn.    She was recently diagonsed with UTI, and was started on po outpatient meds.  Per ER report, patient may have been accidentally taking half the dose prescribed.  UTI symptoms have persisted, and she began having altered mental status/confusion.    The patient is unable to give me much history today as she has signs of delirium- described as below- and is unable to answer most questions correctly.          Past Medical History   I have reviewed this patient's medical history and updated it with pertinent information if needed.   Past Medical History:   Diagnosis Date     Breast cancer (H)     Invasive ductal carcinoma of Left brst     Cancer (H)     Basel cell Skin cancer      Chronic pain      Clostridium difficile colitis 2011     Hyperlipidemia      Hypertension      Multiple sclerosis (H)      Nerve pain      Neurogenic bladder      BRITTANY (obstructive sleep apnea)      Recurrent UTI        Past Surgical History   I have reviewed this patient's surgical history and updated it with pertinent information if needed.  Past Surgical History:   Procedure Laterality Date     BIOPSY NODE SENTINEL Left 8/15/2018    Procedure: BIOPSY NODE SENTINEL;;  Surgeon: Mariel Boucher MD;  Location: [facility]     LUMPECTOMY BREAST WITH SEED LOCALIZATION Left 8/15/2018    Procedure: LUMPECTOMY BREAST WITH SEED LOCALIZATION;  LEFT SEED LOCALIZED BREAST LUMPECTOMY WITH SENTINEL NODE BIOPSY ;  Surgeon: Mariel Boucher MD;  Location: [facility]     ORTHOPEDIC SURGERY      ankle       Prior to Admission  Medications   Prior to Admission Medications   Prescriptions Last Dose Informant Patient Reported? Taking?   Calcium Citrate-Vitamin D (CITRACAL + D PO) Past Month at Unknown time Self Yes Yes   Sig: Take 1 tablet by mouth daily    Cholecalciferol 100 MCG (4000 UT) CAPS Past Month at Unknown time Self Yes Yes   Sig: Take 4,000 Units by mouth daily   HYDROcodone-acetaminophen (NORCO) 5-325 MG per tablet prn Self No No   Sig: Take 1-2 tablets by mouth every 4 hours as needed for other (Moderate to Severe Pain)   anastrozole (ARIMIDEX) 1 MG tablet 3/14/2021 at Unknown time Self Yes Yes   Sig: Take 1 mg by mouth daily   baclofen (LIORESAL) 10 MG tablet 3/14/2021 at Unknown time Self Yes Yes   Sig: Take 30 mg by mouth 4 times daily    ciprofloxacin (CIPRO) 500 MG tablet 3/14/2021 at Unknown time  Yes Yes   Sig: Take 500 mg by mouth 2 times daily   cyanocobalamin (VITAMIN B-12) 100 MCG tablet Past Month at Unknown time Self Yes Yes   Sig: Take 100 mcg by mouth daily   diazepam (VALIUM) 5 MG tablet prn Self Yes No   Sig: Take 5 mg by mouth 2 times daily as needed    pramipexole (MIRAPEX) 0.25 MG tablet 3/14/2021 at Unknown time Self Yes Yes   Sig: Take 0.25 mg by mouth At Bedtime Take 2-3 hours before bedtime   sertraline (ZOLOFT) 25 MG tablet 3/14/2021 at Unknown time Self Yes Yes   Sig: Take 50 mg by mouth daily    traMADol (ULTRAM) 50 MG tablet 3/14/2021 at Unknown time Self Yes Yes   Sig: Take 50 mg by mouth 2 times daily      Facility-Administered Medications: None     Allergies   Allergies   Allergen Reactions     Solu-Medrol [Methylprednisolone]      High Dose caused Delirium       Social History   I have reviewed this patient's social history and updated it with pertinent information if needed. Mariela Allen  reports that she has never smoked. She has never used smokeless tobacco. She reports current alcohol use.   Unclear etoh frequency.    Family History   I have reviewed this patient's family history and  "updated it with pertinent information if needed.   None pertinent.    Review of Systems   The 10 point Review of Systems is negative other than noted in the HPI. And Chronic BLE pain due to her MS.    Physical Exam   Temp: 97.2  F (36.2  C) Temp src: Oral BP: (!) 165/62 Pulse: 83   Resp: 16 SpO2: 95 % O2 Device: None (Room air)    Vital Signs with Ranges  Temp:  [96.7  F (35.9  C)-97.3  F (36.3  C)] 97.2  F (36.2  C)  Pulse:  [69-84] 83  Resp:  [16-18] 16  BP: (131-169)/(62-83) 165/62  SpO2:  [95 %-98 %] 95 %  0 lbs 0 oz    General Appearance:  No acute distress  Neuro:       Mental Status Exam:    JESSE MOLINA, repeatedly asks questions such as \"is my  at home?\" \"Who is with the cats?\", expresses that she does not want to go to the hospital.  I told her a few times that she is in the hospital, and then a few minutes later she tells me that she does not want to go to the hospital.       Cranial Nerves:   CN 2-12 examined and intact           Motor:   B hands contractures, 1/5 , 4/5 B deltoids, 4/5 B dorsiflesion and 3/5 B plantar flexion, 2/5 B hip flexion, feet tonically plantar flexed          Reflexes:  Absent BUE, 2+ R knee, 1+ L knee, 0 B Achilles, no clonus but contractures at ankles       Sensory:  Decreased LT B feet otherwise normal                  Coordination:   unable       Gait:  Unable- wheelchair bound   Neck: no nuchal rigidity. No carotid bruits.    Extremities: No clubbing, no cyanosis, no edema  CV: RRR nl s1, s2  Resp: CTAB        Data   Results for orders placed or performed during the hospital encounter of 03/15/21 (from the past 24 hour(s))   Basic metabolic panel   Result Value Ref Range    Sodium 145 (H) 133 - 144 mmol/L    Potassium 3.5 3.4 - 5.3 mmol/L    Chloride 113 (H) 94 - 109 mmol/L    Carbon Dioxide 25 20 - 32 mmol/L    Anion Gap 7 3 - 14 mmol/L    Glucose 127 (H) 70 - 99 mg/dL    Urea Nitrogen 21 7 - 30 mg/dL    Creatinine 1.53 (H) 0.52 - 1.04 mg/dL    GFR Estimate 34 (L) >60 " mL/min/[1.73_m2]    GFR Estimate If Black 39 (L) >60 mL/min/[1.73_m2]    Calcium 8.7 8.5 - 10.1 mg/dL   CBC with platelets   Result Value Ref Range    WBC 5.5 4.0 - 11.0 10e9/L    RBC Count 3.84 3.8 - 5.2 10e12/L    Hemoglobin 11.9 11.7 - 15.7 g/dL    Hematocrit 36.6 35.0 - 47.0 %    MCV 95 78 - 100 fl    MCH 31.0 26.5 - 33.0 pg    MCHC 32.5 31.5 - 36.5 g/dL    RDW 13.8 10.0 - 15.0 %    Platelet Count 283 150 - 450 10e9/L           Imaging and procedures:  I personally reviewed these images.  None today.

## 2021-03-16 NOTE — PLAN OF CARE
Pt is AxO to self and very easily gets confused, VSS ex) increased BP- Norvasc started today. Chronic pain with tylenol and tramadol given. IV infusing. WC bound at baseline. Reg diet. neurogenic bladder- can be incontinent- purewick in place.  Right upper arm scab. Left thigh multiple scratches and prevenative meplix placed on coccyx. Two puncture sites on abd for Lovenox injections today with band aids placed- CDI. EEG showing encephalopathy.  Discharge to where unclear at this time when medically stable; nursing will continue to monitor.

## 2021-03-16 NOTE — CONSULTS
Care Management Initial Consult    General Information  Assessment completed with: Patient, Spouse or significant other(patient not A+O spouse VM full), patient at bedside not A+O  Type of CM/SW Visit: CM Role Introduction    Primary Care Provider verified and updated as needed: No   Readmission within the last 30 days: no previous admission in last 30 days      Reason for Consult: discharge planning         Communication Assessment  Patient's communication style: spoken language (English or Bilingual)    Hearing Difficulty or Deaf: no   Wear Glasses or Blind: no    Cognitive  Cognitive/Neuro/Behavioral: .WDL except  Level of Consciousness: confused  Arousal Level: arouses to voice  Orientation: place, time, situation  Mood/Behavior: anxious, restless  Best Language: 0 - No aphasia  Speech: clear    Living Environment:   People in home: spouse, other (see comments)  Slava  Current living Arrangements: house      Able to return to prior arrangements: other (see comments)(unknown pending PT consult)       Family/Social Support:  Care provided by: spouse/significant other, other (see comments)(PCA per patient Sandra )  Provides care for: no one, unable/limited ability to care for self  Marital Status:     Slava       Description of Support System: Other (see comments)(unable to contact spouse at this time)         Current Resources:   Patient receiving home care services: No     Community Resources: PCA(per patient)  Equipment currently used at home: walker, standard  Supplies currently used at home:      Employment/Financial:  Employment Status: disabled        Financial Concerns: No concerns identified           Lifestyle & Psychosocial Needs:        Socioeconomic History     Marital status:      Spouse name: Not on file     Number of children: Not on file     Years of education: Not on file     Highest education level: Not on file     Tobacco Use     Smoking status: Never Smoker     Smokeless  tobacco: Never Used   Substance and Sexual Activity     Alcohol use: Yes     Comment: drinks wine almost daily        Functional Status:  Prior to admission patient needed assistance:   Per Review of H&P and admissions in the past (8/20) patient uses wheelchair unsure if she is able to transfer to/from out of wheelchair per review of notes in the past patient declined a naye lift.           Additional Information:  Writer met with the patient at the bedside.  Patient is not alert and oriented.  Writer introduced role and scope in safe discharge planning.    Patient informs this writer that she needs to make sure her  is feeding the cats as he doesn't answer his phone.  She is asking about her antibiotics, and stated that she has help at home with a person named Sandra.  Writer attempted to call the spouse with only number listed in Epic [phone] however it goes to Voicmail with a message that the mailbox is full.  Patient is pending antibiotic recommendations, and PT consult for discharge planning.  Will continue to follow for further discharge planning needs.     Ashlie Stock RN

## 2021-03-16 NOTE — PROGRESS NOTES
"   03/16/21 1335   Quick Adds   Type of Visit Initial PT Evaluation   Living Environment   People in home spouse   Current Living Arrangements house   Home Accessibility wheelchair accessible   Self-Care   Usual Activity Tolerance poor   Current Activity Tolerance poor   Regular Exercise No   Equipment Currently Used at Home wheelchair, power   Activity/Exercise/Self-Care Comment Per pt, she has a home health aide who is present during the day named Jenny who assists her with all tasks. States spouse is also present but \"does not help as much.\" Pt transfers via a stand pivot transfer with assist of 1, is able to assist with ADLs with her L hand more than the R. States independence with feeding, can wash face and brush teeth with set up. Pt states she spends most of the day in her wc. Pt does not take steps and does not ambulate at baseline. Pt states that a naye lift was being ordered for her but was not sure if it was going to be delivered soon.   Disability/Function   Fall history within last six months no   General Information   Onset of Illness/Injury or Date of Surgery 03/15/21   Referring Physician Zoie Arrington PA-C   Patient/Family Therapy Goals Statement (PT) Return home   Pertinent History of Current Problem (include personal factors and/or comorbidities that impact the POC) Pt admitted with altered mental status, UTI, acute renal failure, hypernatremia. PMH: MS with tetraplegia and spasticity, neurogenic bladder, HTN, depression, BRITTANY.   Existing Precautions/Restrictions fall   Weight-Bearing Status - LLE full weight-bearing   Weight-Bearing Status - RLE full weight-bearing   Cognition   Orientation Status (Cognition) person   Affect/Mental Status (Cognition) confused   Follows Commands (Cognition) follows one-step commands   Cognitive Status Comments Able to re-orient for a few minutes and then pt forgets where she is again, asking where her spouse is, where her cats are and if her spouse \"took them " "back to where the came from.\" Unable to state the month or year.   Pain Assessment   Patient Currently in Pain Yes, see Vital Sign flowsheet  (feet)   Posture    Posture Forward head position;Protracted shoulders   Range of Motion (ROM)   ROM Comment B feet unable to achieve neutral dorsiflextion. B hips very tight and unable to flex past approx 80 deg, R knee able to flex to 90 and L knee unable to flex past 60 deg approx. R hand contracted into a fist - able to open fingers to slip a wash cloth roll into it, L hand able to open part of the way. More ROM in the L UE than the R.   Strength   Strength Comments B knees able to extend against gravity no more than 3/5, no hip motion against gravity and minimal ankle motion against gravity noted.   Bed Mobility   Comment (Bed Mobility) Supine to sit dependent assist of 2   Transfers   Transfer Safety Comments NT   Gait/Stairs (Locomotion)   Comment (Gait/Stairs) NA   Balance   Balance Comments Strong posterior and L lean while sitting EOB and pt has minimal ability to assist. States at baseline she is able to help more than now.   Sensory Examination   Sensory Perception Comments States baseline numbness and tingling in LEs, no feeling in the soles of the feet.   Coordination   Coordination Comments Unable to perform with strength deficits.   Muscle Tone   Muscle Tone Comments Spasticity present in B UEs and LEs.   Clinical Impression   Criteria for Skilled Therapeutic Intervention yes, treatment indicated   PT Diagnosis (PT) Difficulty with transfers   Influenced by the following impairments Pain, dec ROM, strength, balance, activity tolearnce   Functional limitations due to impairments Difficulty transferring   Clinical Presentation Evolving/Changing   Clinical Presentation Rationale Confused   Clinical Decision Making (Complexity) moderate complexity   Therapy Frequency (PT) 5x/week   Predicted Duration of Therapy Intervention (days/wks) 1 week   Planned Therapy " Interventions (PT) bed mobility training;transfer training;ROM (range of motion);neuromuscular re-education   Risk & Benefits of therapy have been explained evaluation/treatment results reviewed;care plan/treatment goals reviewed;risks/benefits reviewed;current/potential barriers reviewed;participants voiced agreement with care plan   PT Discharge Planning    PT Discharge Recommendation (DC Rec) Transitional Care Facility   PT Rationale for DC Rec Pt is unable to transfer with assist of 1 at this time which is her baseline. Unsure of her reliability but pt states a nyae lift was being ordered for her home. If this is true and pt/family goal is to return home then recommend home with  home PT and use of naye lift   PT Brief overview of current status  Recommend nursing use lift for transfers and to perform LE ROM program.   Total Evaluation Time   Total Evaluation Time (Minutes) 15

## 2021-03-17 ENCOUNTER — APPOINTMENT (OUTPATIENT)
Dept: PHYSICAL THERAPY | Facility: CLINIC | Age: 71
DRG: 689 | End: 2021-03-17
Attending: PHYSICIAN ASSISTANT
Payer: COMMERCIAL

## 2021-03-17 ENCOUNTER — APPOINTMENT (OUTPATIENT)
Dept: OCCUPATIONAL THERAPY | Facility: CLINIC | Age: 71
DRG: 689 | End: 2021-03-17
Attending: PHYSICIAN ASSISTANT
Payer: COMMERCIAL

## 2021-03-17 LAB
ANION GAP SERPL CALCULATED.3IONS-SCNC: 9 MMOL/L (ref 3–14)
BUN SERPL-MCNC: 13 MG/DL (ref 7–30)
CALCIUM SERPL-MCNC: 8.7 MG/DL (ref 8.5–10.1)
CHLORIDE SERPL-SCNC: 103 MMOL/L (ref 94–109)
CO2 SERPL-SCNC: 26 MMOL/L (ref 20–32)
CREAT SERPL-MCNC: 1.15 MG/DL (ref 0.52–1.04)
GFR SERPL CREATININE-BSD FRML MDRD: 48 ML/MIN/{1.73_M2}
GLUCOSE SERPL-MCNC: 77 MG/DL (ref 70–99)
POTASSIUM SERPL-SCNC: 2.9 MMOL/L (ref 3.4–5.3)
POTASSIUM SERPL-SCNC: 3.4 MMOL/L (ref 3.4–5.3)
SODIUM SERPL-SCNC: 138 MMOL/L (ref 133–144)

## 2021-03-17 PROCEDURE — 97110 THERAPEUTIC EXERCISES: CPT | Mod: GP

## 2021-03-17 PROCEDURE — 250N000013 HC RX MED GY IP 250 OP 250 PS 637: Performed by: INTERNAL MEDICINE

## 2021-03-17 PROCEDURE — 97530 THERAPEUTIC ACTIVITIES: CPT | Mod: GP

## 2021-03-17 PROCEDURE — 99207 PR APP CREDIT; MD BILLING SHARED VISIT: CPT | Performed by: PHYSICIAN ASSISTANT

## 2021-03-17 PROCEDURE — 99233 SBSQ HOSP IP/OBS HIGH 50: CPT | Performed by: INTERNAL MEDICINE

## 2021-03-17 PROCEDURE — 97535 SELF CARE MNGMENT TRAINING: CPT | Mod: GO | Performed by: OCCUPATIONAL THERAPIST

## 2021-03-17 PROCEDURE — 250N000011 HC RX IP 250 OP 636: Performed by: PHYSICIAN ASSISTANT

## 2021-03-17 PROCEDURE — 120N000001 HC R&B MED SURG/OB

## 2021-03-17 PROCEDURE — 97165 OT EVAL LOW COMPLEX 30 MIN: CPT | Mod: GO | Performed by: OCCUPATIONAL THERAPIST

## 2021-03-17 PROCEDURE — 258N000002 HC RX IP 258 OP 250: Performed by: PHYSICIAN ASSISTANT

## 2021-03-17 PROCEDURE — 36415 COLL VENOUS BLD VENIPUNCTURE: CPT | Performed by: PHYSICIAN ASSISTANT

## 2021-03-17 PROCEDURE — 80048 BASIC METABOLIC PNL TOTAL CA: CPT | Performed by: PHYSICIAN ASSISTANT

## 2021-03-17 PROCEDURE — 250N000011 HC RX IP 250 OP 636: Performed by: PSYCHIATRY & NEUROLOGY

## 2021-03-17 PROCEDURE — 99207 PR CDG-CUT & PASTE-POTENTIAL IMPACT ON LEVEL: CPT | Performed by: INTERNAL MEDICINE

## 2021-03-17 PROCEDURE — 84132 ASSAY OF SERUM POTASSIUM: CPT | Performed by: PHYSICIAN ASSISTANT

## 2021-03-17 PROCEDURE — 250N000013 HC RX MED GY IP 250 OP 250 PS 637: Performed by: PHYSICIAN ASSISTANT

## 2021-03-17 PROCEDURE — 258N000003 HC RX IP 258 OP 636: Performed by: PSYCHIATRY & NEUROLOGY

## 2021-03-17 PROCEDURE — 97110 THERAPEUTIC EXERCISES: CPT | Mod: GO | Performed by: OCCUPATIONAL THERAPIST

## 2021-03-17 RX ORDER — AMLODIPINE BESYLATE 10 MG/1
10 TABLET ORAL DAILY
Status: DISCONTINUED | OUTPATIENT
Start: 2021-03-17 | End: 2021-03-20 | Stop reason: HOSPADM

## 2021-03-17 RX ORDER — POTASSIUM CHLORIDE 1500 MG/1
40 TABLET, EXTENDED RELEASE ORAL ONCE
Status: COMPLETED | OUTPATIENT
Start: 2021-03-17 | End: 2021-03-17

## 2021-03-17 RX ORDER — POTASSIUM CHLORIDE 1500 MG/1
20 TABLET, EXTENDED RELEASE ORAL ONCE
Status: COMPLETED | OUTPATIENT
Start: 2021-03-17 | End: 2021-03-17

## 2021-03-17 RX ORDER — HYDRALAZINE HYDROCHLORIDE 20 MG/ML
10 INJECTION INTRAMUSCULAR; INTRAVENOUS EVERY 4 HOURS PRN
Status: DISCONTINUED | OUTPATIENT
Start: 2021-03-17 | End: 2021-03-20 | Stop reason: HOSPADM

## 2021-03-17 RX ADMIN — THIAMINE HYDROCHLORIDE 500 MG: 100 INJECTION, SOLUTION INTRAMUSCULAR; INTRAVENOUS at 16:43

## 2021-03-17 RX ADMIN — BACLOFEN 15 MG: 10 TABLET ORAL at 10:28

## 2021-03-17 RX ADMIN — ENOXAPARIN SODIUM 40 MG: 40 INJECTION SUBCUTANEOUS at 13:27

## 2021-03-17 RX ADMIN — SERTRALINE HYDROCHLORIDE 50 MG: 25 TABLET ORAL at 10:28

## 2021-03-17 RX ADMIN — POTASSIUM CHLORIDE 40 MEQ: 1500 TABLET, EXTENDED RELEASE ORAL at 12:13

## 2021-03-17 RX ADMIN — PRAMIPEXOLE DIHYDROCHLORIDE 0.25 MG: 0.25 TABLET ORAL at 21:16

## 2021-03-17 RX ADMIN — BACLOFEN 15 MG: 10 TABLET ORAL at 21:16

## 2021-03-17 RX ADMIN — BACLOFEN 15 MG: 10 TABLET ORAL at 14:05

## 2021-03-17 RX ADMIN — THIAMINE HYDROCHLORIDE 500 MG: 100 INJECTION, SOLUTION INTRAMUSCULAR; INTRAVENOUS at 10:46

## 2021-03-17 RX ADMIN — CEFTRIAXONE SODIUM 2 G: 2 INJECTION, POWDER, FOR SOLUTION INTRAMUSCULAR; INTRAVENOUS at 13:25

## 2021-03-17 RX ADMIN — AMLODIPINE BESYLATE 10 MG: 10 TABLET ORAL at 10:29

## 2021-03-17 RX ADMIN — BACLOFEN 15 MG: 10 TABLET ORAL at 17:13

## 2021-03-17 RX ADMIN — THIAMINE HYDROCHLORIDE 500 MG: 100 INJECTION, SOLUTION INTRAMUSCULAR; INTRAVENOUS at 22:10

## 2021-03-17 RX ADMIN — SODIUM CHLORIDE: 4.5 INJECTION, SOLUTION INTRAVENOUS at 07:38

## 2021-03-17 RX ADMIN — POTASSIUM CHLORIDE 20 MEQ: 1500 TABLET, EXTENDED RELEASE ORAL at 14:06

## 2021-03-17 ASSESSMENT — ACTIVITIES OF DAILY LIVING (ADL)
ADLS_ACUITY_SCORE: 33
ADLS_ACUITY_SCORE: 27
ADLS_ACUITY_SCORE: 33
ADLS_ACUITY_SCORE: 32
ADLS_ACUITY_SCORE: 33
ADLS_ACUITY_SCORE: 33

## 2021-03-17 NOTE — PLAN OF CARE
"Shift Note:    Current UC negative -, but MD's prefer to continue pt on her current IV Rocephin for UTI (diagnosed earlier this month). Severe weakness and contracted upper extremities (unable to feed self) due to hx MS. Swallows small pills 1-2 at at time depending on size. Was able to swallow the Potassium (K+ replacement done for K+ 2.9. Next lab draw is 1800) by writer dissolving it into a slurry with scant warm water and mixed with applesauce. Is much more alert and talkative today. Disorientated to time and situation. Is forgetful. Is very weak, stayed in bed this shift and requires ceiling lift for transfers   VSS, ex HTN  BP high prior to BP meds given but improved by shift end.Denies N/V, regular diet. Incontinent of B/B, purewick in place. PIV infusing 1/2 NS @ 100 ml/hr. T/R q2h.   NA Daydra, while assisting pt at bedside reported overhearing wife talking to  on phone who sounded confused, very slurred words, difficult to understand   Disposition: Pt lives at home with . ( and pt both have hx ETOH abuse). Pt states  \"doesn't help her with any bathing or hardly anything else\". Pt is inc of both bowel and bladder and requires full assist of all cares due to MS.  Pt states has personal aids that comes for 2 hours in am to get pt bathed then out of bed into w/c. Evening aid comes to home to help with hs cares and get pt back into bed. Pt told writer that if she soiles her brief (stool or urine)  won't do nilsa care. She'll wait in soiled brief till next seen by visiting aid. Writer informed SW of above. Writer feels pt requires TCU/LTC due to increased weakness (needs ceiling lift) and family's inability to care for pr 24/7  Discharge plan pending safe location, medical improvement, OT/PT,  "

## 2021-03-17 NOTE — PROGRESS NOTES
ORI received phone call from Aurora Still from [facility] [phone].  [facility] would like a formal cognitive evaluation in order to determine needs.  ORI will request that MD order a psychiatry evaluation to determine cognitive status, and will ask that ORI or CC call Aurora Still from [facility] after that evaluation to discuss results.  [facility] has 5 days from today, 3/17/21 to complete assessment.   ADDENDUM:  ORI text paged MD to please order a psychiatry evaluation to determine pt cognitive status.

## 2021-03-17 NOTE — PROGRESS NOTES
ORI Note:    D/I:  SW spoke with patient to discuss discharge planning, however was working with therapies.  SW will reach out to patient after she has completed session.     ROSEY Dickey, [facility]  [phone]  [facility]

## 2021-03-17 NOTE — PLAN OF CARE
DATE & TIME: 3/16/21 PMs   Cognitive Concerns/ Orientation: AO to self, confused  BEHAVIOR & AGGRESSION TOOL COLOR: Green  CIWA SCORE: NA    ABNL VS/O2: VSS on RA - except for elevated BP  MOBILITY: Turn and repo Q2H  PAIN MANAGEMENT: Denies   DIET: Reg  BOWEL/BLADDER: incontinent of B&B, PW in place  ABNL LAB/BG: Elevated:  Creatinine 153, Glucose 127. Na+ slightly elevated 145  DRAIN/DEVICES: Purewick. Peripheral IV  TELEMETRY RHYTHM: NA  SKIN: Scab rt arm. Scratches noted on thighs.    TESTS/PROCEDURES: NA   D/C DAY/GOALS/PLACE: Pending placement   OTHER IMPORTANT INFO: Confused. Oriented to self. WC bound per baseline.

## 2021-03-17 NOTE — PROGRESS NOTES
03/17/21 1000   Quick Adds   Type of Visit Initial Occupational Therapy Evaluation   Living Environment   People in home spouse   Current Living Arrangements house   Home Accessibility wheelchair accessible   Transportation Anticipated   (has used [facility] wheelchair in the past)   Living Environment Comments Pt. reports home in wheelchair accessible, getting a lift for transfers, but does not have as of yet;Pt. has a roll-in shower, shower chair on wheels, higher toilet(uses shower chair over toilet), grab bars in shower and by toilet;power WC   Self-Care   Usual Activity Tolerance poor   Current Activity Tolerance poor   Regular Exercise   (has HEP for BUE/LE ROM--does not do regularly)   Equipment Currently Used at Home grab bar, toilet;grab bar, tub/shower;raised toilet seat;shower chair;transfer board;wheelchair, power   Activity/Exercise/Self-Care Comment Pt. reports she has a PCA 2 hours in the morning and 1 hour in the evening daily, spouse also there, however pt. related he does not help out too much. Pt. reports she is able to brush teeth and wash face w/set-up, PCA assists w/: hair care, dressing, toileting, bathing, transfer;pt. related PCA able to lift her from her bed and put on shower chair, also decribes a squat-pivot transfer for toileting, uses grab bar for partial standing. Pt. reports she uses a sliding board to transfer from bed-WC-bed, able to partially stand for sliding board placement per pt. Pt. reports she is getting a lift for home. Spouse assist w/ medication mgmt., pt. able to use [employer] oven to prep food; PCA does some cleaning.    Disability/Function   Walking or Climbing Stairs ambulation difficulty, dependent;transferring difficulty, requires equipment;transferring difficulty, assistance 1 person   Dressing/Bathing Difficulty yes   Dressing/Bathing bathing difficulty, requires equipment;bathing difficulty, dependent;dressing difficulty, dependent   Toileting issues yes   Toileting  Assistance toileting difficulty, requires equipment;toileting difficulty, assistance 1 person   Fall history within last six months yes   Number of times patient has fallen within last six months 1   Change in Functional Status Since Onset of Current Illness/Injury yes   General Information   Onset of Illness/Injury or Date of Surgery 03/15/21   Referring Physician Zoie Arrington PA-C    Patient/Family Therapy Goal Statement (OT) Pt. would like to discharge home.   Additional Occupational Profile Info/Pertinent History of Current Problem OT:Pt admitted with altered mental status, UTI, acute renal failure, hypernatremia. PMH: MS with tetraplegia and spasticity, neurogenic bladder, HTN, depression, BRITTANY.   Performance Patterns (Routines, Roles, Habits) pt. has assist for all I/ADL's/mobility;able to do some grooming/hyg. w/ set-up   Existing Precautions/Restrictions fall   General Observations and Info Pt. lying in bed, A  & O except for year as 2012, agreeable to OT. cooperative   Cognitive Status Examination   Orientation Status person;place   Affect/Mental Status (Cognitive) WFL   Follows Commands WFL;follows one-step commands   Safety Deficit   (responded to safety questions appropriately)   Memory Deficit minimal deficit  (STM recall 2/3 after delay)   Cognitive Status Comments appears baseline, following directions, appears to have good insight   Visual Perception   Impact of Vision Impairment on Function (Vision) no vision problems noted/reported   Sensory   Sensory Comments BLE numbness/tingling   Pain Assessment   Patient Currently in Pain Yes, see Vital Sign flowsheet  (BLE pain)   Posture   Posture forward head position;protracted shoulders   Range of Motion Comprehensive   Comment, General Range of Motion shoulder AROM limited to grossly 70 degrees, WFL elbow flex./ext., difficulty moving R wrist due to IVsite/pain;B hands contracted, in flexion/fisted position;recent botox to LUE, able to demo.  active digit extension;PROM+WNL;R hand w/increased tightness, able to passively range;no pain reported w/ ROM;pt. reports she has B hand splints at home that she wears a tnight, but does not wear consistenly   Strength Comprehensive (MMT)   Comment, General Manual Muscle Testing (MMT) Assessment gen.weakness;decreased activity caro, Bshoulder strength=grossly 4-/5, B elbow strength=grossly 4/5   Muscle Tone Assessment   Muscle Tone Comments Spasticity present in B UEs and LEs.   Coordination   Functional Limitations   (limited by B hand contractures)   Bed Mobility   Comment (Bed Mobility) lift for transfers   Transfers   Transfer Comments pt. describes being able to do a squat pivot at home, also uses sliding board   Balance   Balance Comments impaired   Grooming Assessment/Training   [place] Level (Grooming) verbal cues;minimum assist (75% patient effort)   Instrumental Activities of Daily Living (IADL)   Previous Responsibilities   (PCA/spouse complete;pt.does prep meals w/ toaster oven)   Clinical Impression   Criteria for Skilled Therapeutic Interventions Met (OT) yes   OT Diagnosis Decline in ADl performance   OT Problem List-Impairments impacting ADL problems related to;activity tolerance impaired;cognition;mobility;range of motion (ROM);strength;pain  (will monitor cognition)   ADL comments/analysis Decline in ADL's   Assessment of Occupational Performance 1-3 Performance Deficits   Identified Performance Deficits grooming, feeding self, transfer   Planned Therapy Interventions (OT) ADL retraining;cognition;ROM;strengthening  (monitor cognition)   Clinical Decision Making Complexity (OT) low complexity   Therapy Frequency (OT) 3x/week   Predicted Duration of Therapy 2-3 days   Risk & Benefits of therapy have been explained evaluation/treatment results reviewed;care plan/treatment goals reviewed;risks/benefits reviewed;current/potential barriers reviewed;participants voiced agreement with care  plan;participants included;patient   OT Discharge Planning    OT Discharge Recommendation (DC Rec) Transitional Care Facility   OT Rationale for DC Rec OT:Pt. appears to be below baseline w/ ADL's/fx.transfers, as is typically able to complete grooming/hyg. tasks and feeding self w/ set-up, squat -pivot/some standing for transfers/toileting;Pt. would benefit from skilled therapy intervention to increase strength/endurance for ADL'/s/transfers + to decrease caregiver burden prior to discharge back home.    Total Evaluation Time (Minutes)   Total Evaluation Time (Minutes) 18

## 2021-03-17 NOTE — PROGRESS NOTES
[facility]    Neurology Progress Note    Date of Service (when I saw the patient): 03/17/2021     Today's developments: She is much improved today compared with yesterday.  Consistent with a delirium that is improving with treatment of her medical issues.    Assessment & Plan   Mariela Allen is a 70 year old female with advanced multiple sclerosis, recurrent UTIs, seen by Dr. Rico at [facility], who was admitted on 3/15/2021 with confusion and UTI. I was asked to see the patient for encephalopathy.  --Her presentation is consistent with delirium.     --Encephalopathy/delirium   --Agree with decreasing the baclofen to half dose, benzos as prn, minimize pain meds and narcotics as prns.  --EEG showed mild to moderate diffuse slowing, sharp contoured waves posteriorly but no clear epileptiform discharges  --Unclear etoh history, continue thiamine wernicke protocol.    --Consider CIWA protocol if signs of withdrawal.  I saw no signs of alcohol withdrawal today on my exam  --She had similar presentation with acute encephalopathy and UTI in Aug 2020, encephalopathy resolved with UTI treatment.  --no new focality to her neuro exam, no imaging needed at this time.  --Covid neg     --UTI- being treated with Ceftriaxone.  --NGUYEN- as per hospitalists, improving.    --Neuro will sign off.  Please call with any questions or concerns.    I discussed the above diagnosis, assessment, testing, and results with the patient.  Total time: 35  Minutes, with more than 50% of the time counseling the patient or coordination of care.        Alanis Scales MD  [facility]  Office Phone [phone]              Interval History   Nurses note that she has been improving clinically with her delirium over the course of the day.  The patient herself says that she feels less confused.  No clinical seizures.  No new focal neuro symptoms.    Physical Exam   Temp: 97.4  F (36.3  C) Temp src: Oral BP: (!)  189/93 Pulse: 70   Resp: 18 SpO2: 94 % O2 Device: None (Room air)    Vitals:    03/16/21 1300   Weight: 65.8 kg (145 lb)     Vital Signs with Ranges  Temp:  [96.2  F (35.7  C)-98.6  F (37  C)] 97.4  F (36.3  C)  Pulse:  [70-79] 70  Resp:  [16-18] 18  BP: (156-189)/(62-93) 189/93  SpO2:  [94 %] 94 %  I/O last 3 completed shifts:  In: 3491 [P.O.:500; I.V.:2991]  Out: 3080 [Urine:3080]      General Appearance:  No acute distress  Neuro:       Mental Status Exam:    Awake alert, oriented to month, not the date and not the year (2011).  She is able to tell me her full birthdate including year, which is improvement from before.  She is able to tell me that she is at hospital, improved from before.  And she tells me the hospitals name.  She tells me that her caretaker is Jenny, who is not currently at the hospital which is an improvement from before, when she was calling out for Jenny.  She is able to tell me her full address as well.       Cranial Nerves:   Extraocular movements intact face equal and symmetric pupils equal round and reactive to light speech no dysarthria           Motor:   4/5 bilateral deltoids 2 out of 5 bilateral handgrip, 4 out of 5 bilateral dorsi and plantar flexion, 2 out of 5 bilateral hip flexion           Sensory: Decreased light touch bilateral feet otherwise normal light touch x4                   Coordination:   Unable due to severe weakness       Gait: Unable due to severe weakness, wheelchair-bound.  CV: RRR nl s1, s2  Resp: CTAB    Extremities: No clubbing, no cyanosis, no edema    Medications     NaCl 100 mL/hr at 03/17/21 0738       amLODIPine  10 mg Oral Daily     baclofen  15 mg Oral 4x Daily     cefTRIAXone  2 g Intravenous Q24H     enoxaparin ANTICOAGULANT  40 mg Subcutaneous Q24H     potassium chloride  20 mEq Oral Once     pramipexole  0.25 mg Oral QPM     sertraline  50 mg Oral Daily     thiamine  500 mg Intravenous TID    Followed by     [START ON 3/19/2021] thiamine  250 mg  Intravenous Daily    Followed by     [START ON 3/24/2021] thiamine  100 mg Oral Daily       Data   Results for orders placed or performed during the hospital encounter of 03/15/21 (from the past 24 hour(s))   Neurology IP Consult: General (non-stroke/non-ICU); Patient to be seen: Routine - within 24 hours; MS, mulitple sedating medications. Encephalopathy; Consultant may enter orders: Yes; Requesting provider? Hospitalist (if different from attending p...    Narrative    Alanis Scales MD     3/16/2021  3:00 PM  [facility]    Neurology Consultation     Date of Admission:  3/15/2021      Assessment & Plan   Mariela Allen is a 70 year old female with advanced multiple   sclerosis, recurrent UTIs, seen by Dr. Rico at [facility], who   was admitted on 3/15/2021 with confusion and UTI. I was asked to   see the patient for encephalopathy.  --Her presentation is consistent with delirium.      --Encephalopathy/delirium   --Agree with decreasing the baclofen to half dose, benzos as prn,   minimize pain meds and narcotics as prns.  --Will get EEG  --Unclear etoh history, will start thiamine wernicke protocol.    --Consider CIWA protocol if signs of withdrawal.  --She had similar presentation with acute encephalopathy and UTI   in Aug 2020, encephalopathy resolved with UTI treatment.  --no new focality to her neuro exam, no imaging needed at this   time.  --Covid neg    --UTI- being treated with Ceftriaxone.  --NGUYEN- as per hospitalists, improving.      Advanced multiple sclerosis with spasticity and neurogenic   bladder, wheelchair bound  --Not on any disease modifying meds at this time  --MS meds as above.   --Sees Dr. Rico at [facility].    --I reviewed Dr. Rico's most recent note.    I discussed the above diagnosis, assessment, and further testing   with the patient and her nurse.        Alanis Scales MD  [facility]  Office Phone [phone]            Code  Status    Full Code        Reason for Consult   Reason for consult: I was asked by ROBERTA Fine to evaluate   this patient for confusion.      Chief Complaint   UTI, does not want to go to the hospital    History is obtained from the patient and the electronic medical   chart.    History of Present Illness   Mariela Allen is a 70 year old female with advanced multiple   sclerosis, recurrent UTIs, seen by Dr. Rico at [facility],, who   presents with confusion, UTI.    In terms of her MS, symptoms began in her 30s.  She is not   currently on any disease modifying agents as they were not   working for her.  She has severe quadraparesis at baseline, uses   a wheelchair.  Per Dr. Rico's last note, there is concern that   patient may need higher level of care than at home, with her    being her primary caretaker.      Mariela does take meds for her MS side effects- baclofen 30 mg   qid, mirapex 0.25 ng at bedtime, sertraline 25 mg, tramadol prn,   vaium 5 mg prn, norco 5-325 prn.    She was recently diagonsed with UTI, and was started on po   outpatient meds.  Per ER report, patient may have been   accidentally taking half the dose prescribed.  UTI symptoms have   persisted, and she began having altered mental status/confusion.    The patient is unable to give me much history today as she has   signs of delirium- described as below- and is unable to answer   most questions correctly.          Past Medical History   I have reviewed this patient's medical history and updated it   with pertinent information if needed.   Past Medical History:   Diagnosis Date     Breast cancer (H)     Invasive ductal carcinoma of Left brst     Cancer (H)     Basel cell Skin cancer      Chronic pain      Clostridium difficile colitis 2011     Hyperlipidemia      Hypertension      Multiple sclerosis (H)      Nerve pain      Neurogenic bladder      BRITTANY (obstructive sleep apnea)      Recurrent UTI        Past Surgical History    I have reviewed this patient's surgical history and updated it   with pertinent information if needed.  Past Surgical History:   Procedure Laterality Date     BIOPSY NODE SENTINEL Left 8/15/2018    Procedure: BIOPSY NODE SENTINEL;;  Surgeon: Mariel Boucher MD;  Location: [facility]     LUMPECTOMY BREAST WITH SEED LOCALIZATION Left 8/15/2018    Procedure: LUMPECTOMY BREAST WITH SEED LOCALIZATION;  LEFT SEED   LOCALIZED BREAST LUMPECTOMY WITH SENTINEL NODE BIOPSY ;  Surgeon:   Mariel Boucher MD;  Location: [facility]     ORTHOPEDIC SURGERY      ankle       Prior to Admission Medications   Prior to Admission Medications   Prescriptions Last Dose Informant Patient Reported? Taking?   Calcium Citrate-Vitamin D (CITRACAL + D PO) Past Month at Unknown   time Self Yes Yes   Sig: Take 1 tablet by mouth daily    Cholecalciferol 100 MCG (4000 UT) CAPS Past Month at Unknown time   Self Yes Yes   Sig: Take 4,000 Units by mouth daily   HYDROcodone-acetaminophen (NORCO) 5-325 MG per tablet prn Self No   No   Sig: Take 1-2 tablets by mouth every 4 hours as needed for other   (Moderate to Severe Pain)   anastrozole (ARIMIDEX) 1 MG tablet 3/14/2021 at Unknown time Self   Yes Yes   Sig: Take 1 mg by mouth daily   baclofen (LIORESAL) 10 MG tablet 3/14/2021 at Unknown time Self   Yes Yes   Sig: Take 30 mg by mouth 4 times daily    ciprofloxacin (CIPRO) 500 MG tablet 3/14/2021 at Unknown time    Yes Yes   Sig: Take 500 mg by mouth 2 times daily   cyanocobalamin (VITAMIN B-12) 100 MCG tablet Past Month at   Unknown time Self Yes Yes   Sig: Take 100 mcg by mouth daily   diazepam (VALIUM) 5 MG tablet prn Self Yes No   Sig: Take 5 mg by mouth 2 times daily as needed    pramipexole (MIRAPEX) 0.25 MG tablet 3/14/2021 at Unknown time   Self Yes Yes   Sig: Take 0.25 mg by mouth At Bedtime Take 2-3 hours before   bedtime   sertraline (ZOLOFT) 25 MG tablet 3/14/2021 at Unknown time Self   Yes Yes   Sig: Take 50 mg by mouth daily    traMADol  "(ULTRAM) 50 MG tablet 3/14/2021 at Unknown time Self Yes   Yes   Sig: Take 50 mg by mouth 2 times daily      Facility-Administered Medications: None     Allergies   Allergies   Allergen Reactions     Solu-Medrol [Methylprednisolone]      High Dose caused Delirium       Social History   I have reviewed this patient's social history and updated it with   pertinent information if needed. Mariela Allen  reports that   she has never smoked. She has never used smokeless tobacco. She   reports current alcohol use.   Unclear etoh frequency.    Family History   I have reviewed this patient's family history and updated it with   pertinent information if needed.   None pertinent.    Review of Systems   The 10 point Review of Systems is negative other than noted in   the HPI. And Chronic BLE pain due to her MS.    Physical Exam   Temp: 97.2  F (36.2  C) Temp src: Oral BP: (!) 165/62 Pulse: 83     Resp: 16 SpO2: 95 % O2 Device: None (Room air)    Vital Signs with Ranges  Temp:  [96.7  F (35.9  C)-97.3  F (36.3  C)] 97.2  F (36.2  C)  Pulse:  [69-84] 83  Resp:  [16-18] 16  BP: (131-169)/(62-83) 165/62  SpO2:  [95 %-98 %] 95 %  0 lbs 0 oz    General Appearance:  No acute distress  Neuro:       Mental Status Exam:    A,A, repeatedly asks questions such   as \"is my  at home?\" \"Who is with the cats?\", expresses   that she does not want to go to the hospital.  I told her a few   times that she is in the hospital, and then a few minutes later   she tells me that she does not want to go to the hospital.       Cranial Nerves:   CN 2-12 examined and intact           Motor:   B hands contractures, 1/5 , 4/5 B deltoids, 4/5   B dorsiflesion and 3/5 B plantar flexion, 2/5 B hip flexion, feet   tonically plantar flexed          Reflexes:  Absent BUE, 2+ R knee, 1+ L knee, 0 B Achilles,   no clonus but contractures at ankles       Sensory:  Decreased LT B feet otherwise normal                  Coordination:   unable       Gait:  " Unable- wheelchair bound   Neck: no nuchal rigidity. No carotid bruits.    Extremities: No clubbing, no cyanosis, no edema  CV: RRR nl s1, s2  Resp: CTAB        Data   Results for orders placed or performed during the hospital   encounter of 03/15/21 (from the past 24 hour(s))   Basic metabolic panel   Result Value Ref Range    Sodium 145 (H) 133 - 144 mmol/L    Potassium 3.5 3.4 - 5.3 mmol/L    Chloride 113 (H) 94 - 109 mmol/L    Carbon Dioxide 25 20 - 32 mmol/L    Anion Gap 7 3 - 14 mmol/L    Glucose 127 (H) 70 - 99 mg/dL    Urea Nitrogen 21 7 - 30 mg/dL    Creatinine 1.53 (H) 0.52 - 1.04 mg/dL    GFR Estimate 34 (L) >60 mL/min/[1.73_m2]    GFR Estimate If Black 39 (L) >60 mL/min/[1.73_m2]    Calcium 8.7 8.5 - 10.1 mg/dL   CBC with platelets   Result Value Ref Range    WBC 5.5 4.0 - 11.0 10e9/L    RBC Count 3.84 3.8 - 5.2 10e12/L    Hemoglobin 11.9 11.7 - 15.7 g/dL    Hematocrit 36.6 35.0 - 47.0 %    MCV 95 78 - 100 fl    MCH 31.0 26.5 - 33.0 pg    MCHC 32.5 31.5 - 36.5 g/dL    RDW 13.8 10.0 - 15.0 %    Platelet Count 283 150 - 450 10e9/L           Imaging and procedures:  I personally reviewed these images.  None today.       EEG Routine    Narrative    EEG Routine Result    EEG DATE: 3/16/21  EEG [id number]  EEG DAY#: 1  EEG SOURCE FILE DURATION: 22 min.    PATIENT INFORMATION: Mariela Allen is a 70 year old year old female   with delirium.     TECHNICAL SUMMARY: This routine EEG was recorded from 10-20 scalp   electrodes placed according to the 10-20 international system. Additional   electrodes were used for referencing, EKG, and to record from other   cerebral regions as appropriate.    BACKGROUND ACTIVITY:  During wakefulness, the background activity consists   of mainly theta frequency activity, with some admixed delta and rare alpha   activity.  There are occasional bilateral sharply contoured waveforms.    There are occasional generalize electrodecrements lasting 1-2 seconds.      ACTIVATION  PROCEDURE: None.    ICTAL: No clinical events or electrographic seizures were recorded.    IMPRESSION:     This electroencephalogram is abnormal due to the presence of generalized   slowing, consistent with an encephalopathy, which can be due to numerous   causes.  There were occasional bilateral sharply contoured posterior waves   that did not fully fit criteria for epileptiform discharges.  No seizures   occurred during the recording.        Alanis Scales MD  [facility]  Office # [phone]   Basic metabolic panel   Result Value Ref Range    Sodium 138 133 - 144 mmol/L    Potassium 2.9 (L) 3.4 - 5.3 mmol/L    Chloride 103 94 - 109 mmol/L    Carbon Dioxide 26 20 - 32 mmol/L    Anion Gap 9 3 - 14 mmol/L    Glucose 77 70 - 99 mg/dL    Urea Nitrogen 13 7 - 30 mg/dL    Creatinine 1.15 (H) 0.52 - 1.04 mg/dL    GFR Estimate 48 (L) >60 mL/min/[1.73_m2]    GFR Estimate If Black 55 (L) >60 mL/min/[1.73_m2]    Calcium 8.7 8.5 - 10.1 mg/dL         Images and Procedures:  I personally reviewed the images of the following studies:  EEG: Generalized slowing with sharply contoured posterior sharp waves.  Posterior sharply contoured waves are not clearly epileptiform.

## 2021-03-17 NOTE — PLAN OF CARE
Alert to self only. VSS, ex HTN (started Norvasc yesterday). Reports pain, but states it is no more than her usual chronic pain. Denies N/V, regular diet. Incontinent of B/B, purewick in place. PIV infusing 1/2 NS @ 100 ml/hr. T/R q2h. Discharge plan pending, OT/PT, Neuro following.
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
## 2021-03-17 NOTE — PROGRESS NOTES
[facility]    Medicine Progress Note - Hospitalist Service       Date of Admission:  3/15/2021  Assessment & Plan             Mariela Allen is a 70 year old female with a past medical history of multiple sclerosis with tetraplegia and spasticity who presented to the emergency department for evaluation of altered mental status in the context of recent diagnosis of E. coli UTI.  She is being admitted for further evaluation.    E. coli UTI: Per care everywhere patient developed UTI symptoms last week.  Urine analysis through urologist grossly abnormal.  She was started on Cipro twice daily.  Culture subsequently grew pansensitive E. coli > 100,000 colonies.  Patient indicated in the emergency department she was confused and only taking the medication once per day.  Afebrile with stable vital signs in the emergency department  -IV ceftriaxone  -Repeat UC on admission negative but given known + culture in CareEverywhere 3/8/21 and unknown compliance with outpatient po antibiotics will plan to treat x 7 days. Can transition to oral antibiotics on discharge     Acute renal failure, improving: Baseline creatinine 0.7-0.8.  Current creatinine elevated at 1.73.  FENA 0.7.  Suspect prerenal in the setting of diminished oral intake due to UTI  - Cr trend 1.73--1.53--1.15  -IV fluids  -Avoid nephrotoxic medications  -Follow BMP  - Monitor PVRs in the setting of neurogenic bladder    Acute metabolic encephalopathy, improved  Concern for Vulnerable Adult: Suspect multifactorial in the setting of UTI, acute renal failure, MS and multiple sedating medications.  She appears nonfocal on exam.  Additionally she indicates in the emergency department she lives with her  who drinks.  Denies issues with safety at home.  It appears during previous hospitalization in 2016 vulnerable adult was filed.  Possible contributing medications include baclofen 30 mg 4 times daily, tramadol 50 mg twice daily, Norco  as needed, Valium 5 mg twice daily as needed. I reviewed [place] prescription database and she has no recent refills of Norco.  She is only had 2 refills of Valium in the past 12 months so low suspicion for benzodiazepine withdrawal  -Stop as needed Valium and Norco   -Reduce baclofen to 15 mg 4 times daily  -Change tramadol to bid prn  -Treat UTI and acute renal failure as above  -Greatly appreciate assistance from Neurology. EEG 3/16 negative. Neurology agrees with reduction of medications. Started on thiamine protocol due to unclear ETOH history. Patient markedly improved today compared to my evaluation yesterday,  -Physical therapy and occupational therapy consult  -Social work consult due to concern for possible vulnerable adult due to reports of ETOH use by  though unable to confirm history at this time    Hypernatremia, resolved  Hypokalemia:   - Continue IVF.   - Replace K per protocol   - Daily BMP    Multiple sclerosis with tetraplegia  Spacticity  Neurogenic Bladder: Follows at [facility]  Maintained on a regimen of baclofen, tramadol and prn valium  -Resume baclofen at 50% dose   -Monitor PVRs for signs of urinary retention.  Patient does not self cath or require Guadarrama catheter at baseline  -PT/OT and social work consult    Hypertension: SBPs above goal in the ER.  Previously maintained on HCTZ though not on medications at the time of admission.  - Norvasc started 3/16, increase to 10 mg/d    Depression:   -Continue Zoloft    BRITTANY  - Non complaint with CPAP    H/o Breast cancer s/p lumpectomy and radiation (2018)  - Hold Arimidex for now, resume on discharge         Diet: Combination Diet Regular Diet Adult  Room Service    DVT Prophylaxis: Enoxaparin (Lovenox) SQ  Guadarrama Catheter: not present  Code Status: Full Code           Disposition Plan   Expected discharge: Encephalopathy improving. Will likely need TCU. Likely discharge tomorrow on oral antibiotics and reduced  dose of baclofen    Entered: Zoie Arrington PA-C 03/17/2021, 10:31 AM       The patient's care was discussed with the Bedside Nurse, Care Coordinator/ and Patient.    Zoie Arrington PA-C  Hospitalist Service  [facility]  Contact information available via [facility] Paging/Directory    ______________________________________________________________________    Interval History   Much more alert today. Could tell me she was there for a UTI, knew the year and the month. Still some intermittent confusion, repeatedly asked for her phone despite pointing out it was on her bedside table multiple times.     Discussed recommendations for TCU. She is reluctant but agreeable    She denies SOB or chest pain. She indicates her pain/spasicity it worse with reduced baclofen but she certainly appears comfortable and has not used any prn tramadol. Discussed we will let Neurology weight in if she should stay on reduced baclofen dose. No N/V    Data reviewed today: I reviewed all medications, new labs and imaging results over the last 24 hours. I personally reviewed no images or EKG's today.    Physical Exam   Vital Signs: Temp: 97.4  F (36.3  C) Temp src: Oral BP: (!) 189/93 Pulse: 70   Resp: 18 SpO2: 94 % O2 Device: None (Room air)    Weight: 145 lbs 0 oz  Physical Exam  Vitals signs and nursing note reviewed.   Constitutional:       General: She is not in acute distress.     Appearance: She is not toxic-appearing.   HENT:      Head: Normocephalic and atraumatic.   Eyes:      General: No scleral icterus.  Cardiovascular:      Rate and Rhythm: Normal rate and regular rhythm.      Heart sounds: No murmur.   Pulmonary:      Effort: Pulmonary effort is normal.      Breath sounds: No wheezing.   Abdominal:      General: Abdomen is flat.      Palpations: Abdomen is soft.      Tenderness: There is no abdominal tenderness.   Musculoskeletal:      Right lower leg: No edema.      Left lower leg: No  edema.   Skin:     Findings: No rash.   Neurological:      General: No focal deficit present.      Mental Status: She is alert.      Cranial Nerves: No cranial nerve deficit.      Comments: Orientation overall improved. Alert to person/place, time and reason for hospitalization but required recurrent redirection throughout visit   Psychiatric:         Mood and Affect: Mood normal.         Behavior: Behavior normal.           Data

## 2021-03-17 NOTE — PROGRESS NOTES
SW note:  Nursing Assistant Nelson came to speak with  due to a concern.  Nelson was speaking to pt  by phone, and  was slurring his words and sounded as though he was drinking heavily and was not able to have a coherent conversation with him.

## 2021-03-18 ENCOUNTER — APPOINTMENT (OUTPATIENT)
Dept: PHYSICAL THERAPY | Facility: CLINIC | Age: 71
DRG: 689 | End: 2021-03-18
Attending: PHYSICIAN ASSISTANT
Payer: COMMERCIAL

## 2021-03-18 LAB
ALBUMIN SERPL-MCNC: 3.2 G/DL (ref 3.4–5)
ALP SERPL-CCNC: 58 U/L (ref 40–150)
ALT SERPL W P-5'-P-CCNC: 19 U/L (ref 0–50)
AST SERPL W P-5'-P-CCNC: 24 U/L (ref 0–45)
BILIRUB SERPL-MCNC: 0.4 MG/DL (ref 0.2–1.3)
CREAT SERPL-MCNC: 0.97 MG/DL (ref 0.52–1.04)
GFR SERPL CREATININE-BSD FRML MDRD: 59 ML/MIN/{1.73_M2}
POTASSIUM SERPL-SCNC: 3 MMOL/L (ref 3.4–5.3)
POTASSIUM SERPL-SCNC: 3.8 MMOL/L (ref 3.4–5.3)
PROT SERPL-MCNC: 6.6 G/DL (ref 6.8–8.8)

## 2021-03-18 PROCEDURE — 84075 ASSAY ALKALINE PHOSPHATASE: CPT | Performed by: PHYSICIAN ASSISTANT

## 2021-03-18 PROCEDURE — 82565 ASSAY OF CREATININE: CPT | Performed by: PHYSICIAN ASSISTANT

## 2021-03-18 PROCEDURE — 36415 COLL VENOUS BLD VENIPUNCTURE: CPT | Performed by: PHYSICIAN ASSISTANT

## 2021-03-18 PROCEDURE — 82247 BILIRUBIN TOTAL: CPT | Performed by: PHYSICIAN ASSISTANT

## 2021-03-18 PROCEDURE — 250N000011 HC RX IP 250 OP 636: Performed by: PSYCHIATRY & NEUROLOGY

## 2021-03-18 PROCEDURE — 258N000003 HC RX IP 258 OP 636: Performed by: PSYCHIATRY & NEUROLOGY

## 2021-03-18 PROCEDURE — 99221 1ST HOSP IP/OBS SF/LOW 40: CPT | Performed by: PSYCHIATRY & NEUROLOGY

## 2021-03-18 PROCEDURE — 84450 TRANSFERASE (AST) (SGOT): CPT | Performed by: PHYSICIAN ASSISTANT

## 2021-03-18 PROCEDURE — 99232 SBSQ HOSP IP/OBS MODERATE 35: CPT | Performed by: PHYSICIAN ASSISTANT

## 2021-03-18 PROCEDURE — 97110 THERAPEUTIC EXERCISES: CPT | Mod: GP

## 2021-03-18 PROCEDURE — 99207 PR CONSULT E&M CHANGED TO INITIAL LEVEL: CPT | Performed by: PSYCHIATRY & NEUROLOGY

## 2021-03-18 PROCEDURE — 250N000013 HC RX MED GY IP 250 OP 250 PS 637: Performed by: PHYSICIAN ASSISTANT

## 2021-03-18 PROCEDURE — 84460 ALANINE AMINO (ALT) (SGPT): CPT | Performed by: PHYSICIAN ASSISTANT

## 2021-03-18 PROCEDURE — 82040 ASSAY OF SERUM ALBUMIN: CPT | Performed by: PHYSICIAN ASSISTANT

## 2021-03-18 PROCEDURE — 84155 ASSAY OF PROTEIN SERUM: CPT | Performed by: PHYSICIAN ASSISTANT

## 2021-03-18 PROCEDURE — 250N000013 HC RX MED GY IP 250 OP 250 PS 637: Performed by: INTERNAL MEDICINE

## 2021-03-18 PROCEDURE — 84132 ASSAY OF SERUM POTASSIUM: CPT | Performed by: PHYSICIAN ASSISTANT

## 2021-03-18 PROCEDURE — 120N000001 HC R&B MED SURG/OB

## 2021-03-18 PROCEDURE — 250N000011 HC RX IP 250 OP 636: Performed by: PHYSICIAN ASSISTANT

## 2021-03-18 PROCEDURE — 97530 THERAPEUTIC ACTIVITIES: CPT | Mod: GP

## 2021-03-18 RX ORDER — POTASSIUM CHLORIDE 1500 MG/1
40 TABLET, EXTENDED RELEASE ORAL ONCE
Status: COMPLETED | OUTPATIENT
Start: 2021-03-18 | End: 2021-03-18

## 2021-03-18 RX ORDER — POTASSIUM CHLORIDE 1500 MG/1
20 TABLET, EXTENDED RELEASE ORAL ONCE
Status: COMPLETED | OUTPATIENT
Start: 2021-03-18 | End: 2021-03-18

## 2021-03-18 RX ADMIN — BACLOFEN 15 MG: 10 TABLET ORAL at 21:34

## 2021-03-18 RX ADMIN — BACLOFEN 15 MG: 10 TABLET ORAL at 18:19

## 2021-03-18 RX ADMIN — AMLODIPINE BESYLATE 10 MG: 10 TABLET ORAL at 09:04

## 2021-03-18 RX ADMIN — POTASSIUM CHLORIDE 20 MEQ: 1500 TABLET, EXTENDED RELEASE ORAL at 11:42

## 2021-03-18 RX ADMIN — BACLOFEN 15 MG: 10 TABLET ORAL at 09:04

## 2021-03-18 RX ADMIN — POTASSIUM CHLORIDE 40 MEQ: 1500 TABLET, EXTENDED RELEASE ORAL at 09:04

## 2021-03-18 RX ADMIN — THIAMINE HYDROCHLORIDE 500 MG: 100 INJECTION, SOLUTION INTRAMUSCULAR; INTRAVENOUS at 10:21

## 2021-03-18 RX ADMIN — ACETAMINOPHEN 650 MG: 325 TABLET, FILM COATED ORAL at 05:25

## 2021-03-18 RX ADMIN — CEPHALEXIN 500 MG: 250 CAPSULE ORAL at 11:42

## 2021-03-18 RX ADMIN — ENOXAPARIN SODIUM 40 MG: 40 INJECTION SUBCUTANEOUS at 14:02

## 2021-03-18 RX ADMIN — CEPHALEXIN 500 MG: 250 CAPSULE ORAL at 16:06

## 2021-03-18 RX ADMIN — PRAMIPEXOLE DIHYDROCHLORIDE 0.25 MG: 0.25 TABLET ORAL at 21:34

## 2021-03-18 RX ADMIN — SERTRALINE HYDROCHLORIDE 50 MG: 25 TABLET ORAL at 09:04

## 2021-03-18 RX ADMIN — BACLOFEN 15 MG: 10 TABLET ORAL at 14:02

## 2021-03-18 RX ADMIN — CEPHALEXIN 500 MG: 250 CAPSULE ORAL at 21:34

## 2021-03-18 ASSESSMENT — ACTIVITIES OF DAILY LIVING (ADL)
ADLS_ACUITY_SCORE: 32
ADLS_ACUITY_SCORE: 28
ADLS_ACUITY_SCORE: 32
ADLS_ACUITY_SCORE: 28

## 2021-03-18 NOTE — PLAN OF CARE
A/Ox4. VSS ex hypertensive. Denies pain. C/o mild headache at times, offered tylenol but patient refused saying headache resolved. Denies nausea/vomiting. Assist x2, lift. Regular diet, total feed. Mepilex to coccyx. T/R q2h. Skin with some scratches, scabs and bruises. Incontinent, purewick in place with good output. K-3.0, replaced and recheck due at 1530. PIV x2 SL. Performed ROM to BLE. Started oral antibiotic. OT to consult. [facility] report filed yesterday, see SW note. Plan pending safe disposition.

## 2021-03-18 NOTE — PROGRESS NOTES
[facility]    Medicine Progress Note - Hospitalist Service       Date of Admission:  3/15/2021  Assessment & Plan                   Mariela Allen is a 70 year old female with a past medical history of multiple sclerosis with tetraplegia and spasticity who presented to the emergency department for evaluation of altered mental status in the context of recent diagnosis of E. coli UTI.  She is being admitted for further evaluation.    E. coli UTI: Per care everywhere patient developed UTI symptoms last week.  Urine analysis through urologist grossly abnormal.  She was started on Cipro twice daily.  Culture subsequently grew pansensitive E. coli > 100,000 colonies.  Patient indicated in the emergency department she was confused and only taking the medication once per day.  Afebrile with stable vital signs in the emergency department  -IV ceftriaxone  -Repeat UC on admission negative but given known + culture in CareEverywhere 3/8/21 and unknown compliance with outpatient po antibiotics electing to treat. Transitioned to Keflex 3/18 for 2 additional days    Acute renal failure, improving: Baseline creatinine 0.7-0.8.  Current creatinine elevated at 1.73.  FENA 0.7.  Suspect prerenal in the setting of diminished oral intake due to UTI  - Cr trend 1.73--1.53--1.15  -IV fluids  -Avoid nephrotoxic medications  -Follow BMP  - Monitor PVRs in the setting of neurogenic bladder    Acute metabolic encephalopathy, improved  Concern for Vulnerable Adult: Suspect multifactorial in the setting of UTI, acute renal failure, MS and multiple sedating medications.  She appears nonfocal on exam.  Additionally she indicates in the emergency department she lives with her  who drinks.  Denies issues with safety at home.  It appears during previous hospitalization in 2016 vulnerable adult was filed.  Possible contributing medications include baclofen 30 mg 4 times daily, tramadol 50 mg twice daily, Norco as  needed, Valium 5 mg twice daily as needed. I reviewed [place] prescription database and she has no recent refills of Norco.  She is only had 2 refills of Valium in the past 12 months so low suspicion for benzodiazepine withdrawal  -Stop as needed Valium and Norco   -Reduce baclofen to 15 mg 4 times daily  -Change tramadol to bid prn  -Treat UTI and acute renal failure as above  -Greatly appreciate assistance from Neurology. EEG 3/16 negative. Neurology agrees with reduction of medications. Started on thiamine protocol due to unclear ETOH history. Neurology has signed off  -Physical therapy and occupational therapy consult. Recommend TCU. Patient prefers to discharge home. Concern for vulnerable adult given patient encephalopathy and suspicion of ETOH by her /primary caregiver. I have attempted to reach  x 3 without success. CNA was concerned patient sounded intoxicated on the phone 3/17. Patient indicates this is because her  doesn't have any teeth  - Vulnerable adult filed 3/17. Rissa requested cognitive evaluation. Occupational therapy consult and Psychiatry consult pending  - SW following for d/c planning. Patient intermittently agreeable to TCU discharge but continues to perseverate on returning home.    Hypokalemia:    - Replace K per protocol   Multiple sclerosis with tetraplegia  Spacticity  Neurogenic Bladder: Follows at [facility]  Maintained on a regimen of baclofen, tramadol and prn valium  -Resume baclofen at 50% dose. Prn valium d/c'd. Tramadol available prn  -Monitor PVRs for signs of urinary retention.  Patient does not self cath or require Guadarrama catheter at baseline  -PT/OT and social work consult    Hypertension: SBPs above goal in the ER.  Previously maintained on HCTZ though not on medications at the time of admission.  - Norvasc started 3/16, increase to 10 mg/d 3/17    Depression:   -Continue Zoloft    BRITTANY  - Non complaint with  CPAP    H/o Breast cancer s/p lumpectomy and radiation (2018)  - Hold Arimidex for now, resume on discharge       Diet: Combination Diet Regular Diet Adult  Room Service    DVT Prophylaxis: Enoxaparin (Lovenox) SQ  Guadarrama Catheter: not present  Code Status: Full Code           Disposition Plan   Expected discharge:Pending safe discharge  Entered: Zoie Arrington PA-C 03/18/2021, 2:51 PM       The patient's care was discussed with the Bedside Nurse, Care Coordinator/ and Patient.    Zoie Arrington PA-C  Hospitalist Service  [facility]  Contact information available via [facility] Paging/Directory    ______________________________________________________________________    Interval History   Remains much improved from admission. Alert to person/place and time. Continues to desire discharge home despite concerns with regards to how she will manage. Denies pain today. Tolerating diet.     Data reviewed today: I reviewed all medications, new labs and imaging results over the last 24 hours. I personally reviewed no images or EKG's today.    Physical Exam   Vital Signs: Temp: 98.5  F (36.9  C) Temp src: Oral BP: (!) 153/70 Pulse: 67   Resp: 16 SpO2: 93 % O2 Device: None (Room air)    Weight: 145 lbs 0 oz  Physical Exam  Vitals signs and nursing note reviewed.   Constitutional:       Appearance: Normal appearance.   HENT:      Head: Normocephalic and atraumatic.   Eyes:      General: No scleral icterus.  Cardiovascular:      Rate and Rhythm: Normal rate and regular rhythm.      Heart sounds: No murmur.   Pulmonary:      Effort: Pulmonary effort is normal.      Breath sounds: No wheezing.   Abdominal:      General: Abdomen is flat.      Tenderness: There is no abdominal tenderness.   Musculoskeletal:      Right lower leg: No edema.      Left lower leg: No edema.      Comments: Contractures of bilateral hands   Skin:     General: Skin is warm and dry.      Findings: No rash.    Neurological:      General: No focal deficit present.      Mental Status: She is alert.           Data

## 2021-03-18 NOTE — CONSULTS
[facility]  Psychiatry Consultation      Patient name: Mariela Allen   MRN: [id number]    Age: 70 year old    YOB: 1950    Identifying information:   The patient is a 70 year old White female who is currently admitted to the hospitalist service on unit 88.    Reason for consult: Psychiatric evaluation was requested by adult protection    History of present illness:   The patient presented to the emergency room for evaluation of an altered mental state.  Records indicate that she had described feeling confused, having difficulty concentrating, while reporting that she was receiving antibiotic therapy for a bladder infection.  She was noted to be experiencing delirium stemming from her UTI which may have additionally been complicated by polypharmacy with baclofen and benzodiazepines.  She was admitted to the hospital for further management.  She was evaluated by the unit  and there was some concern regarding the condition of her living environment leading to a report to adult protection.  Psychiatry was consulted to assess her cognition as requested by adult protection.  On examination today, the patient had no particular mental health related concerns.  She reports mild and intermittent sadness related to random stressors however does not interpret feeling overwhelmed or impaired by these episodes.  She denied a history of being treated for mental health conditions.  She explains that she is aware of the adult protection report and suspects it may be related to the condition her home was then when EMS had arrived.  She mentions that her  has been physically ill and therefore has not been able to maintain their home as they usually do.  Her recent illness and UTI have further added to this situation.  She denied any abuse or significant relationship difficulties with her .  She explains that she often becomes confused when experiencing a urinary  tract infection.  She anticipates improvement after completing her treatment course.  She has no other concerns to report today.    Psychiatric Review of Systems:    -- Depressive episode: Denied symptoms including denial of suicidal ideation and denial of homicidal ideation.  -- Kristina:  denies symptoms  -- Psychosis:  denies symptoms  -- Anxiety: denies symptoms corresponding to MARLYS or panic disorder  -- PTSD: denies symptoms  -- OCD: denies  symptoms  -- Eating disorder: denies symptoms    Psychiatric History:    History of mild depression treated on an outpatient basis with sertraline.  No prior psychiatric hospitalizations.  No prior suicide attempts.    Substance Use History:    Denies using illicit substances or alcohol corresponding to a diagnosis of abuse or dependence. No prior chemical dependency treatments reported.    Medical History:  Refer to the internal medicine notes which I reviewed.   Past Medical History:   Diagnosis Date     Breast cancer (H)      Cancer (H)      Chronic pain      Clostridium difficile colitis 2011     Hyperlipidemia      Hypertension      Multiple sclerosis (H)      Nerve pain      Neurogenic bladder      BRITTANY (obstructive sleep apnea)      Recurrent UTI         Medications:    Current Facility-Administered Medications:      acetaminophen (TYLENOL) Suppository 650 mg, 650 mg, Rectal, Q4H PRN, Zoie Arrington PA-C     acetaminophen (TYLENOL) tablet 650 mg, 650 mg, Oral, Q4H PRN, Zoie Arrington PA-C, 650 mg at 03/18/21 0525     amLODIPine (NORVASC) tablet 10 mg, 10 mg, Oral, Daily, Zoie Arrington PA-C, 10 mg at 03/18/21 0904     baclofen (LIORESAL) half-tab 15 mg, 15 mg, Oral, 4x Daily, Zoie Arrington PA-C, 15 mg at 03/18/21 1819     cephALEXin (KEFLEX) capsule 500 mg, 500 mg, Oral, TID, Zoie Arrington PA-C, 500 mg at 03/18/21 1606     enoxaparin ANTICOAGULANT (LOVENOX) injection 40 mg, 40 mg, Subcutaneous, Q24H, Zoie Arrington PA-C, 40 mg at  03/18/21 1402     hydrALAZINE (APRESOLINE) injection 10 mg, 10 mg, Intravenous, Q4H PRN, Zoie Arrington PA-C     lidocaine (LMX4) cream, , Topical, Q1H PRN, Zoie Arrington PA-C     lidocaine 1 % 0.1-1 mL, 0.1-1 mL, Other, Q1H PRN, Zoie Arrington PA-C     melatonin tablet 1 mg, 1 mg, Oral, At Bedtime PRN, Zoie Arrington PA-C     naloxone (NARCAN) injection 0.2 mg, 0.2 mg, Intravenous, Q2 Min PRN **OR** naloxone (NARCAN) injection 0.4 mg, 0.4 mg, Intravenous, Q2 Min PRN **OR** naloxone (NARCAN) injection 0.2 mg, 0.2 mg, Intramuscular, Q2 Min PRN **OR** naloxone (NARCAN) injection 0.4 mg, 0.4 mg, Intramuscular, Q2 Min PRN, Tj Vides MD     ondansetron (ZOFRAN-ODT) ODT tab 4 mg, 4 mg, Oral, Q6H PRN **OR** ondansetron (ZOFRAN) injection 4 mg, 4 mg, Intravenous, Q6H PRN, Zoie Arrington PA-C     pramipexole (MIRAPEX) tablet 0.25 mg, 0.25 mg, Oral, QPM, Tj Vides MD, 0.25 mg at 03/17/21 2116     prochlorperazine (COMPAZINE) injection 5 mg, 5 mg, Intravenous, Q6H PRN **OR** prochlorperazine (COMPAZINE) tablet 5 mg, 5 mg, Oral, Q6H PRN **OR** prochlorperazine (COMPAZINE) suppository 12.5 mg, 12.5 mg, Rectal, Q12H PRN, Zoie Arrington PA-C     sertraline (ZOLOFT) tablet 50 mg, 50 mg, Oral, Daily, Zoie Arrington PA-C, 50 mg at 03/18/21 0904     sodium chloride (PF) 0.9% PF flush 3 mL, 3 mL, Intracatheter, Q8H PRN, Zoie Arrington PA-C     sodium chloride (PF) 0.9% PF flush 3 mL, 3 mL, Intracatheter, q1 min prn, Zoie Arrington PA-C     [COMPLETED] thiamine (B-1) 500 mg in sodium chloride 0.9 % 50 mL intermittent infusion, 500 mg, Intravenous, TID, Last Rate: 100 mL/hr at 03/18/21 1021, 500 mg at 03/18/21 1021 **FOLLOWED BY** [START ON 3/19/2021] thiamine (B-1) 250 mg in sodium chloride 0.9 % 50 mL intermittent infusion, 250 mg, Intravenous, Daily **FOLLOWED BY** [START ON 3/24/2021] thiamine (B-1) tablet 100 mg, 100 mg, Oral, Daily, Alanis Scales  "MD YEN     traMADol (ULTRAM) tablet 50 mg, 50 mg, Oral, BID PRN, Zoie Arrington PA-C     Social History:  Refer to the psychosocial assessment completed by the .  Social History     Social History Narrative     Not on file         Family History: She recalls chemical dependency in the family, particularly her brother who is .  No family history of mental illness or suicides.  No family history on file.    Vital Signs:    B/P: 135/66, T: 98.6, P: 67, R: 16  Estimated body mass index is 26.52 kg/m  as calculated from the following:    Height as of 20: 1.575 m (5' 2\").    Weight as of this encounter: 65.8 kg (145 lb).       Mental status examination:  Appearance:  Alert, fair hygiene, no acute distress  Attitude:  Attempts to be cooperative  Eye Contact: Fair  Mood: \"Fine\"  Affect: Mood congruent and appropriate  Speech:  Normal rates, tone, latency, volume. Not pushed or pressured.  Psychomotor Behavior:  No psychomotor abnormalities noted  Thought Process: Linear and logical; not tangential or circumstantial or disorganized  Associations:  Logical; no loose associations noted  Thought Content:  No obvious paranoia, delusions, ideas of reference, or grandiosity noted. Denies auditory or visual hallucinations. Denies suicidal Ideations. Denies homicidal ideations.  Insight:  Fair  Judgment:  Fair  Oriented to:  time (March, 1 day after [employer] Day,  then guessed 2010, I then corrected her to .  Fully oriented to person, place, and situation.  Attention Span and Concentration:  Intact  Recent and Remote Memory: Intact based on interviewing and details provided  Language: Appropriate based on interviewing  Fund of Knowledge: Appropriate based on interviewing.  She was able to identify the current president as Biden, previous president as Troj, and the president prior to him as \"he was a imlton\" later being corrected to Kim Milton, while correcting for Obama preceding " [place].  Muscle Strength and Tone: Normal upon visual inspection     Diagnoses:    Delirium due to multiple etiologies (UTI, polypharmacy, acute renal failure, electrolyte abnormalities)  Unspecified depressive disorder  Urinary tract infection  Acute renal failure  Multiple sclerosis  Hypernatremia, resolved, hypokalemia  Neurogenic bladder  Hypertension  Obstructive sleep apnea  History of breast cancer status post treatment     Recommendations:  Continue Zoloft 50 mg daily for treatment of mild depressive disorder.    Mild residual symptoms of a delirium were noted and anticipated to improve as the correlating etiologies are addressed.    Occupational therapy consultation to establish initiate cognitive screening test with a SLUMS test.    Once symptoms of delirium have fully resolved, formal neurocognitive testing may be pursued on an outpatient basis.    Please reconsult with psychiatry as needed.   Wong Calderon MD   Text Page

## 2021-03-18 NOTE — PLAN OF CARE
A/O x3, disoriented to time. VSS ex HTN, recently started Norvasc. PRN Tylenol x1 for C/o headache. Denies N/V, reg diet. Mepilex to coccyx, T/R q2h, scabbed scratches to L thigh. Pt up with Ax2 lift. Incontinent, purewick in place with good UOP. [facility] report filed yesterday, see SW note. Plan pending safe disposition.

## 2021-03-18 NOTE — PLAN OF CARE
A&OX4, VSS on RA. C/o chronic pain 2/2 MS managed with scheduled baclofen. Denies n&V or SOB. WC bound at baseline. Up AX2 with lift. Turned and repoed q 2hrs. Purewick in place. Regular diet. SW following for vulnerable adult case. Discharge pending safe disposition. PIV Sled. Continue to monitor.

## 2021-03-19 LAB
CREAT SERPL-MCNC: 0.82 MG/DL (ref 0.52–1.04)
GFR SERPL CREATININE-BSD FRML MDRD: 72 ML/MIN/{1.73_M2}
MAGNESIUM SERPL-MCNC: 1.6 MG/DL (ref 1.6–2.3)
PHOSPHATE SERPL-MCNC: 4.4 MG/DL (ref 2.5–4.5)
PLATELET # BLD AUTO: 333 10E9/L (ref 150–450)
POTASSIUM SERPL-SCNC: 3.3 MMOL/L (ref 3.4–5.3)
POTASSIUM SERPL-SCNC: 4.1 MMOL/L (ref 3.4–5.3)

## 2021-03-19 PROCEDURE — 250N000011 HC RX IP 250 OP 636: Performed by: PHYSICIAN ASSISTANT

## 2021-03-19 PROCEDURE — 36415 COLL VENOUS BLD VENIPUNCTURE: CPT | Performed by: INTERNAL MEDICINE

## 2021-03-19 PROCEDURE — 84132 ASSAY OF SERUM POTASSIUM: CPT | Performed by: INTERNAL MEDICINE

## 2021-03-19 PROCEDURE — 99232 SBSQ HOSP IP/OBS MODERATE 35: CPT | Performed by: HOSPITALIST

## 2021-03-19 PROCEDURE — 250N000013 HC RX MED GY IP 250 OP 250 PS 637: Performed by: HOSPITALIST

## 2021-03-19 PROCEDURE — 258N000003 HC RX IP 258 OP 636: Performed by: PSYCHIATRY & NEUROLOGY

## 2021-03-19 PROCEDURE — 36415 COLL VENOUS BLD VENIPUNCTURE: CPT | Performed by: PHYSICIAN ASSISTANT

## 2021-03-19 PROCEDURE — 250N000011 HC RX IP 250 OP 636: Performed by: HOSPITALIST

## 2021-03-19 PROCEDURE — 250N000013 HC RX MED GY IP 250 OP 250 PS 637: Performed by: PHYSICIAN ASSISTANT

## 2021-03-19 PROCEDURE — 84100 ASSAY OF PHOSPHORUS: CPT | Performed by: INTERNAL MEDICINE

## 2021-03-19 PROCEDURE — 120N000001 HC R&B MED SURG/OB

## 2021-03-19 PROCEDURE — 82565 ASSAY OF CREATININE: CPT | Performed by: INTERNAL MEDICINE

## 2021-03-19 PROCEDURE — 250N000013 HC RX MED GY IP 250 OP 250 PS 637: Performed by: INTERNAL MEDICINE

## 2021-03-19 PROCEDURE — 250N000011 HC RX IP 250 OP 636: Performed by: PSYCHIATRY & NEUROLOGY

## 2021-03-19 PROCEDURE — 85049 AUTOMATED PLATELET COUNT: CPT | Performed by: INTERNAL MEDICINE

## 2021-03-19 PROCEDURE — 84132 ASSAY OF SERUM POTASSIUM: CPT | Performed by: PHYSICIAN ASSISTANT

## 2021-03-19 PROCEDURE — 83735 ASSAY OF MAGNESIUM: CPT | Performed by: INTERNAL MEDICINE

## 2021-03-19 RX ORDER — POLYETHYLENE GLYCOL 3350 17 G/17G
17 POWDER, FOR SOLUTION ORAL DAILY
Status: DISCONTINUED | OUTPATIENT
Start: 2021-03-19 | End: 2021-03-20 | Stop reason: HOSPADM

## 2021-03-19 RX ORDER — CEPHALEXIN 500 MG/1
500 CAPSULE ORAL 3 TIMES DAILY
Qty: 3 CAPSULE | Refills: 0 | Status: SHIPPED | OUTPATIENT
Start: 2021-03-19 | End: 2023-01-17

## 2021-03-19 RX ORDER — POTASSIUM CHLORIDE 1500 MG/1
40 TABLET, EXTENDED RELEASE ORAL ONCE
Status: COMPLETED | OUTPATIENT
Start: 2021-03-19 | End: 2021-03-19

## 2021-03-19 RX ORDER — TRAMADOL HYDROCHLORIDE 50 MG/1
50 TABLET ORAL 2 TIMES DAILY PRN
COMMUNITY
Start: 2021-03-19

## 2021-03-19 RX ORDER — AMLODIPINE BESYLATE 10 MG/1
10 TABLET ORAL DAILY
Qty: 30 TABLET | Refills: 0 | Status: SHIPPED | OUTPATIENT
Start: 2021-03-20 | End: 2023-01-17

## 2021-03-19 RX ORDER — AMOXICILLIN 250 MG
1 CAPSULE ORAL 2 TIMES DAILY PRN
Status: DISCONTINUED | OUTPATIENT
Start: 2021-03-19 | End: 2021-03-20 | Stop reason: HOSPADM

## 2021-03-19 RX ORDER — BACLOFEN 10 MG/1
30 TABLET ORAL 4 TIMES DAILY
COMMUNITY
Start: 2021-03-19

## 2021-03-19 RX ORDER — MAGNESIUM SULFATE HEPTAHYDRATE 40 MG/ML
2 INJECTION, SOLUTION INTRAVENOUS ONCE
Status: COMPLETED | OUTPATIENT
Start: 2021-03-19 | End: 2021-03-19

## 2021-03-19 RX ORDER — LANOLIN ALCOHOL/MO/W.PET/CERES
100 CREAM (GRAM) TOPICAL DAILY
Qty: 30 TABLET | Refills: 0 | Status: SHIPPED | OUTPATIENT
Start: 2021-03-24 | End: 2023-01-17

## 2021-03-19 RX ADMIN — CEPHALEXIN 500 MG: 250 CAPSULE ORAL at 21:11

## 2021-03-19 RX ADMIN — BACLOFEN 15 MG: 10 TABLET ORAL at 17:01

## 2021-03-19 RX ADMIN — SERTRALINE HYDROCHLORIDE 50 MG: 25 TABLET ORAL at 10:08

## 2021-03-19 RX ADMIN — BACLOFEN 15 MG: 10 TABLET ORAL at 12:41

## 2021-03-19 RX ADMIN — BACLOFEN 15 MG: 10 TABLET ORAL at 21:11

## 2021-03-19 RX ADMIN — CEPHALEXIN 500 MG: 250 CAPSULE ORAL at 17:02

## 2021-03-19 RX ADMIN — THIAMINE HYDROCHLORIDE 250 MG: 100 INJECTION, SOLUTION INTRAMUSCULAR; INTRAVENOUS at 10:22

## 2021-03-19 RX ADMIN — POLYETHYLENE GLYCOL 3350 17 G: 17 POWDER, FOR SOLUTION ORAL at 10:09

## 2021-03-19 RX ADMIN — POTASSIUM CHLORIDE 40 MEQ: 1500 TABLET, EXTENDED RELEASE ORAL at 10:09

## 2021-03-19 RX ADMIN — PRAMIPEXOLE DIHYDROCHLORIDE 0.25 MG: 0.25 TABLET ORAL at 21:11

## 2021-03-19 RX ADMIN — TRAMADOL HYDROCHLORIDE 50 MG: 50 TABLET ORAL at 11:21

## 2021-03-19 RX ADMIN — MAGNESIUM SULFATE HEPTAHYDRATE 2 G: 40 INJECTION, SOLUTION INTRAVENOUS at 12:00

## 2021-03-19 RX ADMIN — BACLOFEN 15 MG: 10 TABLET ORAL at 10:09

## 2021-03-19 RX ADMIN — CEPHALEXIN 500 MG: 250 CAPSULE ORAL at 10:08

## 2021-03-19 RX ADMIN — TRAMADOL HYDROCHLORIDE 50 MG: 50 TABLET ORAL at 21:12

## 2021-03-19 RX ADMIN — ENOXAPARIN SODIUM 40 MG: 40 INJECTION SUBCUTANEOUS at 13:09

## 2021-03-19 RX ADMIN — AMLODIPINE BESYLATE 10 MG: 10 TABLET ORAL at 10:16

## 2021-03-19 ASSESSMENT — ACTIVITIES OF DAILY LIVING (ADL)
ADLS_ACUITY_SCORE: 32

## 2021-03-19 NOTE — CONSULTS
Care Management Follow Up    Length of Stay (days): 4    Expected Discharge Date: 03/20/21(tcu)     Concerns to be Addressed: discharge planning     Patient plan of care discussed at interdisciplinary rounds: Yes    Anticipated Discharge Disposition: Home, Home Care,     Anticipated Discharge Services: PCA(per patient [facility])  Anticipated Discharge DME:  Wheelchair vs stretcher ride to be determined    Patient/family educated on Medicare website which has current facility and service quality ratings: yes  Education Provided on the Discharge Plan: yes   Patient/Family in Agreement with the Plan: yes    Referrals Placed by CM/SW:  CM  Private pay costs discussed: transportation costs  Wheelchair ride private 75.00/base 5.00/mile  Additional Information:  Writer was informed that the patient is refusing TCU.  Patient agreeable to homecare RN at discharge.  Writer discussed with Hospitalist that patient wanted our staff to call spouse Slava to discuss the above.  Writer was able to get in touch with Slava today at [phone]  He is aware that Mariela is refusing TCU and prefers home with homecare and that she is medically stable.    Per Slava patient has two PCA's and they come 2x/day 7 days a week. Slava aware of [facility] homecare referral for discharge.  Writer confirmed the address and PCP in [facility] are corrct as listed.  Slava is not able to assist with transportation and is aware that someone will need to be home to receive the patient and to transfer the patient to her wheelchair or bed at home.  Writer called and talked to the patients [facility] [phone], per [place] they usually see the patient from 09:00-11:00 a.m and then once in the evening for tuck in.  Per [place] they would not be available to receive the patient today and she is checking to see if they will be able to have two staff for discharge in the a.m. 3/20/2021.   awaits a call back from [place] to schedule this w/chair  ride for [facility] (for early discharge), PA/MD aware.  Once Sandra confirms this will update both patient and spouse.    [facility] heads up for start of care on or before 3/21.    Writer will reconnect with patient and spouse once the above is known. Bedside RN updated.    Addendum: 3/19/2021 12:15  Confirmed with Sandra patient's PCA that they will have the RN and PCA out to the house tomorrow 3/20 at 09:15 a.m. Writer called [facility] wheelchair transport for 09:00 03/20.  [facility] confirmed for SN SOC 3/22 (Monday).  Writer will update patient on the above attempted to reach spouse Slava by phone but no answer.  Updated provider(s) and bedside as patient is an early discharge for 3/20/2021.      Ashlie Stock RN

## 2021-03-19 NOTE — PROVIDER NOTIFICATION
MD Notification    Notified Person: MD    Notified Person Name: Dr. Rodriguez    Notification Date/Time: 3/19/2021 10:20 a.m.    Notification Interaction: FYCAL regarding discharge plan patient refusing TCU prefers home with homecare    Purpose of Notification: wanted update on discharge    Orders Received: Per MD patient prefers home with homecare she would like her  called today at 11:00 a.m. to coordinate discharge    Comments:

## 2021-03-19 NOTE — PLAN OF CARE
A&Ox4 overnight. VSS on RA. Denies pain. Pt declined q2 turn/repo overnight. Pt rested comfortably throughout the night. Purewick in place. K recheck this am, replaced yesterday, last 3.8. Psych eval completed yesterday for vulnerable adult file, discharge pending.

## 2021-03-19 NOTE — PLAN OF CARE
A/Ox3, slightly disoriented to situation. VSS on RA, denies pain. K replaced on days, recheck 3.8. Q2 T/R. Scratches to L upper thigh, R arm scabbed. Incont of urine, purewick in place. One formed incontinent bowel movement this shift. Psych eval completed for vulnerable adult file, discharge pending.

## 2021-03-19 NOTE — PROGRESS NOTES
Care Management Follow Up    Length of Stay (days): 4    Expected Discharge Date: 03/20/21     Concerns to be Addressed: discharge planning     Patient plan of care discussed at interdisciplinary rounds: Yes    Anticipated Discharge Disposition: Home, Home Care,      Anticipated Discharge Services: PCA(per patient PCA Sandra)  Anticipated Discharge DME:      Patient/family educated on Medicare website which has current facility and service quality ratings:   Education Provided on the Discharge Plan:    Patient/Family in Agreement with the Plan: yes    Referrals Placed by CM/SW:    Private pay costs discussed: Not applicable    Additional Information:  Updated [place] with [facility], [phone], about pt's discharge plan. See RN ERICA Dailey's note for details on plan. SW available as needed.        ROSEY Morfin, LGSW  [phone]  [facility]

## 2021-03-20 VITALS
TEMPERATURE: 98 F | SYSTOLIC BLOOD PRESSURE: 143 MMHG | RESPIRATION RATE: 16 BRPM | BODY MASS INDEX: 26.52 KG/M2 | HEART RATE: 69 BPM | OXYGEN SATURATION: 92 % | DIASTOLIC BLOOD PRESSURE: 65 MMHG | WEIGHT: 145 LBS

## 2021-03-20 LAB — POTASSIUM SERPL-SCNC: 3.7 MMOL/L (ref 3.4–5.3)

## 2021-03-20 PROCEDURE — 36415 COLL VENOUS BLD VENIPUNCTURE: CPT | Performed by: PHYSICIAN ASSISTANT

## 2021-03-20 PROCEDURE — 258N000003 HC RX IP 258 OP 636: Performed by: PSYCHIATRY & NEUROLOGY

## 2021-03-20 PROCEDURE — 84132 ASSAY OF SERUM POTASSIUM: CPT | Performed by: PHYSICIAN ASSISTANT

## 2021-03-20 PROCEDURE — 250N000013 HC RX MED GY IP 250 OP 250 PS 637: Performed by: PHYSICIAN ASSISTANT

## 2021-03-20 PROCEDURE — 250N000011 HC RX IP 250 OP 636: Performed by: PSYCHIATRY & NEUROLOGY

## 2021-03-20 PROCEDURE — 99239 HOSP IP/OBS DSCHRG MGMT >30: CPT | Performed by: HOSPITALIST

## 2021-03-20 RX ORDER — POLYETHYLENE GLYCOL 3350 17 G/17G
POWDER, FOR SOLUTION ORAL
COMMUNITY
Start: 2021-03-20 | End: 2023-01-17

## 2021-03-20 RX ADMIN — SERTRALINE HYDROCHLORIDE 50 MG: 25 TABLET ORAL at 08:02

## 2021-03-20 RX ADMIN — BACLOFEN 15 MG: 10 TABLET ORAL at 08:01

## 2021-03-20 RX ADMIN — AMLODIPINE BESYLATE 10 MG: 10 TABLET ORAL at 08:01

## 2021-03-20 RX ADMIN — THIAMINE HYDROCHLORIDE 250 MG: 100 INJECTION, SOLUTION INTRAMUSCULAR; INTRAVENOUS at 08:05

## 2021-03-20 ASSESSMENT — ACTIVITIES OF DAILY LIVING (ADL)
ADLS_ACUITY_SCORE: 28

## 2021-03-20 NOTE — PLAN OF CARE
Physical Therapy Discharge Summary    Reason for therapy discharge:    Discharged to home with home therapy.    Progress towards therapy goal(s). See goals on Care Plan in [facility] electronic health record for goal details.  Goals not met.  Barriers to achieving goals:   discharge from facility.    Therapy recommendation(s):    Continued therapy is recommended.  Rationale/Recommendations:  to maximize functional independence.

## 2021-03-20 NOTE — PROGRESS NOTES
VSS. A&ox4. Pain well controlled with oral meds. Reviewed AVS and teach back done with patient. Answered all the questions. No concerns. Discharge medications and instruction sheets given. Discharge home today. Left floor via wheelchair.

## 2021-03-20 NOTE — PLAN OF CARE
A/Ox4. Pleasant/Cooperative. VSS on room air. Slightly hypertensive.  C/O pain to BLE (baseline). PRN Tramadol given x1. Pt. Declined turn/repo. Allowed x4 throughout night. PROM x2 to BLE. Purewick in place- adequate output. Last K+ check was 4.1. Recheck this AM (3/20/21) lab draw.  Total feed- needs encouragement with eating. Scratches to L-upper thigh. 2-(R)PIV-SL. Plan to discharge home this am at 0900 via transport.

## 2021-03-20 NOTE — PLAN OF CARE
A&Ox4. Slight HTN. C/o pain to BLE prn tramadol given x 1. Pt declined q2 turn/repo did let me x 3 in 12 hours.  Purewick in place. K this am 3.3, replaced recheck 4.1. Poor appetite total feed needs lots of encouragement.  Has scratches to L upper thigh.  Plan to discharge to home tomorrow am at 0900 via transport.

## 2021-03-20 NOTE — DISCHARGE SUMMARY
[facility]    Discharge Summary  Hospitalist    Date of Admission:  3/15/2021  Date of Discharge:  3/20/2021  Discharging Provider: Mahi Rodriguez DO  Date of Service (when I saw the patient): 03/20/21    Discharge Diagnoses   E. coli UTI  Acute renal failure, resolved  Acute metabolic encephalopathy, resolved  Concern for Vulnerable Adult  Hypokalemia:    Multiple sclerosis with tetraplegia  Spacticity  Neurogenic Bladder  Hypertension  Depression:   BRITTANY  H/o Breast cancer s/p lumpectomy and radiation (2018)    History of Present Illness   Mraiela Aleln is an 70 year old female who presented with encephalopathy secondary to e. Coli UTI. Resolved with treatment of UTI and adjustment to sedating medications. Concern about vulnerable adult due to inability to reach  and hx of vulnerable adult in 2016. Eventually able to reach  and discuss plan of care, she has two PCAs that visit twice daily and help. Plan for discharge to home with home care RN/PT/OT    Hospital Course   Mariela Allen was admitted on 3/15/2021.  The following problems were addressed during her hospitalization:    E. coli UTI  Per care everywhere patient developed UTI symptoms last week.  Urine analysis through urologist grossly abnormal.  She was started on Cipro twice daily.  Culture subsequently grew pansensitive E. coli > 100,000 colonies.  Patient indicated in the emergency department she was confused and only taking the medication once per day.  Afebrile with stable vital signs in the emergency department. High risk for recurrent UTIs due to neurogenic bladder  -IV ceftriaxone  -Repeat UC on admission negative but given known + culture in CareEverywhere 3/8/21 and unknown compliance with outpatient po antibiotics electing to treat. Transitioned to Keflex 3/18 for 2 additional days, remaining course prescribed  - follow up with PCP within 7 days due to medication change and hospital  follow-up     Acute renal failure, resolved  Baseline creatinine 0.7-0.8. On admit, creatinine elevated at 1.73.  FENA 0.7.  Suspect prerenal in the setting of diminished oral intake due to UTI  - Cr trend 1.73--1.53--1.1--0.8     Acute metabolic encephalopathy, resolved  Concern for Vulnerable Adult  Suspect multifactorial in the setting of UTI, acute renal failure, MS and multiple sedating medications.  She appears nonfocal on exam.  Additionally she indicates in the emergency department she lives with her  who drinks.  Denies issues with safety at home.  It appears during previous hospitalization in 2016 vulnerable adult was filed.  Possible contributing medications include baclofen 30 mg 4 times daily, tramadol 50 mg twice daily, Norco as needed, Valium 5 mg twice daily as needed. I reviewed [place] prescription database and she has no recent refills of Norco.  She is only had 2 refills of Valium in the past 12 months so low suspicion for benzodiazepine withdrawal  -Stop as needed Valium and Norco   -Reduced baclofen to 15 mg 4 times daily  -Change tramadol to bid prn  -Treat UTI and acute renal failure as above  -Greatly appreciate assistance from Neurology. EEG 3/16 negative. Neurology agrees with reduction of medications. Started on thiamine protocol due to unclear ETOH history. Neurology has signed off  -Physical therapy and occupational therapy consult. Recommend TCU. Patient prefers to discharge home. Vulnerable adult filed by ORI. D/c home tomorrow with home care RN/PT/OT and resumption of PCA services  - PO thiamine on discharge     Hypokalemia:    - Replace K per protocol      Multiple sclerosis with tetraplegia  Spacticity  Neurogenic Bladder  Follows at [facility]  Maintained on a regimen of baclofen, tramadol and prn valium  -Resume baclofen at 50% dose. Prn valium d/c'd. Tramadol available prn   - recommend half cap full of miralax daily for infrequent  stools     Hypertension  SBPs above goal in the ER.  Previously maintained on HCTZ though not on medications at the time of admission.  - Norvasc started 3/16, increase to 10 mg/d 3/17. Will continue on discharge and follow up with PCP     Depression:   -Continue Zoloft     BRITTANY  - Non compliant with CPAP     H/o Breast cancer s/p lumpectomy and radiation (2018)  - Hold Arimidex for now, resume on discharge    Mahi Rodriguez, DO    Significant Results and Procedures   See above    Pending Results   These results will be followed up by PCP, no growth after 5 days  Unresulted Labs Ordered in the Past 30 Days of this Admission     Date and Time Order Name Status Description    3/15/2021 1215 Blood culture Preliminary     3/15/2021 1215 Blood culture Preliminary           Code Status   Full Code       Primary Care Physician   Apurva Jackson    Physical Exam   Temp: 98  F (36.7  C) Temp src: Oral BP: (!) 143/65 Pulse: 69   Resp: 16 SpO2: 92 % O2 Device: None (Room air)    Vitals:    03/16/21 1300   Weight: 65.8 kg (145 lb)     Vital Signs with Ranges  Temp:  [97.7  F (36.5  C)-98.6  F (37  C)] 98  F (36.7  C)  Pulse:  [66-76] 69  Resp:  [16] 16  BP: (119-161)/(59-85) 143/65  SpO2:  [92 %-95 %] 92 %  I/O last 3 completed shifts:  In: 580 [P.O.:580]  Out: 1225 [Urine:1225]    Patient seen and examined prior to discharge. She feels good, happy to be going home. Has questions regarding if she can get a purewick at home. No chest pain, shortness of breath, nausea, vomiting. Has not yet eaten breakfast. She is stable to return home with home care services.    Constitutional: Awake, alert, cooperative, no apparent distress.  Eyes: Conjunctiva and pupils examined and normal.  HEENT: Moist mucous membranes, normal dentition.  Respiratory: Clear to auscultation bilaterally, no crackles or wheezing.  Cardiovascular: Regular rate and rhythm, normal S1 and S2, and no murmur noted.  GI: Soft, non-distended, non-tender, normal  bowel sounds.  Lymph/Hematologic: No anterior cervical or supraclavicular adenopathy.  Skin: No rashes, no cyanosis, no edema.  Musculoskeletal: No joint swelling, erythema or tenderness. Chronic contractures bilateral hands  Neurologic: Cranial nerves 2-12 intact, normal strength and sensation.  Psychiatric: Alert, oriented to person, place and time, no obvious anxiety or depression.    Discharge Disposition   Discharged to home with home care RN/PT/OT  Condition at discharge: Stable    Consultations This Hospital Stay   CARE MANAGEMENT / SOCIAL WORK IP CONSULT  PHYSICAL THERAPY ADULT IP CONSULT  OCCUPATIONAL THERAPY ADULT IP CONSULT  NEUROLOGY IP CONSULT  CARE MANAGEMENT / SOCIAL WORK IP CONSULT  PSYCHIATRY IP CONSULT  OCCUPATIONAL THERAPY ADULT IP CONSULT  OCCUPATIONAL THERAPY ADULT IP CONSULT    Time Spent on this Encounter   IMahi DO, personally saw the patient today and spent greater than 30 minutes discharging this patient.    Discharge Orders      Home care nursing referral      Home Care PT Referral for Hospital Discharge      Home Care OT Referral for Hospital Discharge      Reason for your hospital stay    You were admitted for evaluation of confusion. This was likely due to urinary tract infection and your medications for MS. You were seen by neurology and your baclofen was reduced.     Follow-up and recommended labs and tests     Follow up with primary care provider, Apurva Jackson, within 7 days to evaluate medication change and for hospital follow- up.  No follow up labs or test are needed.     Activity    Your activity upon discharge: activity as tolerated     MD face to face encounter    Documentation of Face to Face and Certification for Home Health Services    I certify that patient: Mariela Allen is under my care and that I, or a nurse practitioner or physician's assistant working with me, had a face-to-face encounter that meets the physician face-to-face encounter  requirements with this patient on: 3/19/2021.    This encounter with the patient was in whole, or in part, for the following medical condition, which is the primary reason for home health care: MS with encephalopathy.    I certify that, based on my findings, the following services are medically necessary home health services: Nursing, Occupational Therapy and Physical Therapy.    My clinical findings support the need for the above services because: Nurse is needed: To provide caregiver training to assist with: medication management.., Occupational Therapy Services are needed to assess and treat cognitive ability and address ADL safety due to impairment in MS and encephalopathy. and Physical Therapy Services are needed to assess and treat the following functional impairments: MS and encephalopthy    Further, I certify that my clinical findings support that this patient is homebound (i.e. absences from home require considerable and taxing effort and are for medical reasons or [employer] services or infrequently or of short duration when for other reasons) because: Requires assistance of another person or specialized equipment to access medical services because patient: Requires supervision of another for safe transfer...    Based on the above findings. I certify that this patient is confined to the home and needs intermittent skilled nursing care, physical therapy and/or speech therapy.  The patient is under my care, and I have initiated the establishment of the plan of care.  This patient will be followed by a physician who will periodically review the plan of care.  Physician/Provider to provide follow up care: Apurva Jackson    Attending [facility] physician (the Medicare certified [place] provider): Mahi Rodriguez DO  Physician Signature: See electronic signature associated with these discharge orders.  Date: 3/19/2021     Diet    Follow this diet upon discharge: Orders Placed This Encounter      Room Service       Combination Diet Regular Diet Adult     Discharge Medications   Current Discharge Medication List      START taking these medications    Details   amLODIPine (NORVASC) 10 MG tablet Take 1 tablet (10 mg) by mouth daily  Qty: 30 tablet, Refills: 0    Comments: Future refills by PCP Dr. Apurva Jackson with phone number [phone].  Associated Diagnoses: Benign essential hypertension      cephALEXin (KEFLEX) 500 MG capsule Take 1 capsule (500 mg) by mouth 3 times daily  Qty: 3 capsule, Refills: 0    Associated Diagnoses: Acute UTI      polyethylene glycol (MIRALAX) 17 GM/Dose powder Take half cap full once daily to have daily bowel movements. If stools are loose, take half cap full every OTHER day.    Associated Diagnoses: Constipation, unspecified constipation type      thiamine (B-1) 100 MG tablet Take 1 tablet (100 mg) by mouth daily  Qty: 30 tablet, Refills: 0    Associated Diagnoses: Confusion         CONTINUE these medications which have CHANGED    Details   baclofen (LIORESAL) 10 MG tablet Take 1.5 tablets (15 mg) by mouth 4 times daily  Qty:        traMADol (ULTRAM) 50 MG tablet Take 1 tablet (50 mg) by mouth 2 times daily as needed for moderate pain  Qty:           CONTINUE these medications which have NOT CHANGED    Details   anastrozole (ARIMIDEX) 1 MG tablet Take 1 mg by mouth daily      Calcium Citrate-Vitamin D (CITRACAL + D PO) Take 1 tablet by mouth daily       Cholecalciferol 100 MCG (4000 UT) CAPS Take 4,000 Units by mouth daily      cyanocobalamin (VITAMIN B-12) 100 MCG tablet Take 100 mcg by mouth daily      pramipexole (MIRAPEX) 0.25 MG tablet Take 0.25 mg by mouth At Bedtime Take 2-3 hours before bedtime      sertraline (ZOLOFT) 25 MG tablet Take 50 mg by mouth daily          STOP taking these medications       ciprofloxacin (CIPRO) 500 MG tablet Comments:   Reason for Stopping:         diazepam (VALIUM) 5 MG tablet Comments:   Reason for Stopping:         HYDROcodone-acetaminophen  (NORCO) 5-325 MG per tablet Comments:   Reason for Stopping:             Allergies   Allergies   Allergen Reactions     Solu-Medrol [Methylprednisolone]      High Dose caused Delirium     Data   Most Recent 3 CBC's:  Recent Labs   Lab Test 03/19/21  0727 03/16/21  0636 03/15/21  1120 08/20/20  0752   WBC  --  5.5 5.3 3.8*   HGB  --  11.9 13.6 11.1*   MCV  --  95 97 96    283 286 341      Most Recent 3 BMP's:  Recent Labs   Lab Test 03/19/21  1429 03/19/21  0727 03/18/21  1548 03/17/21  0650 03/17/21  0650 03/16/21  0636 03/15/21  1120   NA  --   --   --   --  138 145* 140   POTASSIUM 4.1 3.3* 3.8   < > 2.9* 3.5 3.8   CHLORIDE  --   --   --   --  103 113* 107   CO2  --   --   --   --  26 25 30   BUN  --   --   --   --  13 21 23   CR  --  0.82 0.97  --  1.15* 1.53* 1.73*   ANIONGAP  --   --   --   --  9 7 3   KIMBER  --   --   --   --  8.7 8.7 9.5   GLC  --   --   --   --  77 127* 104*    < > = values in this interval not displayed.     Most Recent 2 LFT's:  Recent Labs   Lab Test 03/18/21  0756 03/15/21  1120   AST 24 32   ALT 19 21   ALKPHOS 58 66   BILITOTAL 0.4 0.2     Most Recent INR's and Anticoagulation Dosing History:  Anticoagulation Dose History     Recent Dosing and Labs Latest Ref Rng & Units 3/15/2021    INR 0.86 - 1.14 0.92        Most Recent 3 Troponin's:  Recent Labs   Lab Test 03/15/21  1120 08/16/20  2306   TROPI <0.015 <0.015     Most Recent Cholesterol Panel:No lab results found.  Most Recent 6 Bacteria Isolates From Any Culture (See EPIC Reports for Culture Details):  Recent Labs   Lab Test 03/15/21  1222 03/15/21  1140 03/15/21  1121 08/16/20  2314 12/02/16  1345   CULT No growth after 5 days No growth No growth after 5 days >100,000 colonies/mL  Aerococcus urinae  Identification obtained by MALDI-TOF mass spectrometry research use only database. Test   characteristics determined and verified by the [facility]   ([facility]) [place], [place].  * >100,000 colonies/mL  mixed urogenital reena Susceptibility testing not routinely   done       Most Recent TSH, T4 and A1c Labs:No lab results found.  Results for orders placed or performed during the hospital encounter of 02/11/21   MA Screen Bilateral w/Elpidio    Narrative    Examination: Bilateral digital screening mammography with computer  aided detection including digital breast tomosynthesis, 2/11/2021 2:12  PM.    Comparison: 1/29/2020, 7/11/2019, 7/2/2018    History: No current breast concerns. LEFT breast cancer status post  breast conservation therapy.    BREAST DENSITY: Scattered fibroglandular densities.    COMMENTS: Breast conservation therapy changes on the LEFT.  No  concerning findings.       Impression    IMPRESSION: BI-RADS CATEGORY: 2 - Benign Finding(s).    RECOMMENDED FOLLOW-UP: Annual Mammography.      The patient will be notified of the results.     LEIGH YEPEZ MD

## 2021-03-21 LAB
BACTERIA SPEC CULT: NO GROWTH
BACTERIA SPEC CULT: NO GROWTH
SPECIMEN SOURCE: NORMAL
SPECIMEN SOURCE: NORMAL

## 2021-03-21 NOTE — PLAN OF CARE
Occupational Therapy Discharge Summary    Reason for therapy discharge:    Discharged to home with home therapy.    Progress towards therapy goal(s). See goals on Care Plan in [facility] electronic health record for goal details.  Goals not met.  Barriers to achieving goals:   discharge from facility.    Therapy recommendation(s):    Continued therapy is recommended.  Rationale/Recommendations:  per chart review, discharge rec was for pt to go to TCU prior to returning home.

## 2021-10-24 ENCOUNTER — HEALTH MAINTENANCE LETTER (OUTPATIENT)
Age: 71
End: 2021-10-24

## 2022-03-22 ENCOUNTER — HOSPITAL ENCOUNTER (OUTPATIENT)
Dept: MAMMOGRAPHY | Facility: CLINIC | Age: 72
Discharge: HOME | End: 2022-03-22
Attending: INTERNAL MEDICINE | Admitting: INTERNAL MEDICINE
Payer: COMMERCIAL

## 2022-03-22 DIAGNOSIS — Z12.31 OTHER SCREENING MAMMOGRAM: ICD-10-CM

## 2022-03-22 PROCEDURE — 77067 SCR MAMMO BI INCL CAD: CPT

## 2022-04-10 ENCOUNTER — HEALTH MAINTENANCE LETTER (OUTPATIENT)
Age: 72
End: 2022-04-10

## 2022-10-03 ENCOUNTER — HOSPITAL ENCOUNTER (OUTPATIENT)
Dept: MAMMOGRAPHY | Facility: CLINIC | Age: 72
Discharge: HOME | End: 2022-10-03
Attending: INTERNAL MEDICINE
Payer: COMMERCIAL

## 2022-10-03 DIAGNOSIS — C50.412 MALIGNANT NEOPLASM OF UPPER-OUTER QUADRANT OF LEFT FEMALE BREAST, UNSPECIFIED ESTROGEN RECEPTOR STATUS (H): ICD-10-CM

## 2022-10-03 DIAGNOSIS — N64.4 MASTODYNIA OF LEFT BREAST: ICD-10-CM

## 2022-10-03 PROCEDURE — 77061 BREAST TOMOSYNTHESIS UNI: CPT | Mod: LT

## 2022-10-03 PROCEDURE — 76642 ULTRASOUND BREAST LIMITED: CPT | Mod: LT

## 2022-10-03 PROCEDURE — 77062 BREAST TOMOSYNTHESIS BI: CPT

## 2022-10-15 ENCOUNTER — HEALTH MAINTENANCE LETTER (OUTPATIENT)
Age: 72
End: 2022-10-15

## 2023-01-06 ENCOUNTER — PATIENT OUTREACH (OUTPATIENT)
Dept: CARE COORDINATION | Facility: CLINIC | Age: 73
End: 2023-01-06

## 2023-01-06 NOTE — PROGRESS NOTES
Clinic Care Coordination Contact  [facility]/Voicemail    Referral Source: Care Team  Clinical Data: Care Coordinator Outreach  Outreach attempted x 1.  Left message on Patient's voicemail with call back information and requested return call.   Care Coordinator will try to reach patient again in 1-2 business days.    KRISTEL Manning  , Care Coordination  [facility]  [phone]  [email or web address]

## 2023-01-09 ENCOUNTER — PATIENT OUTREACH (OUTPATIENT)
Dept: CARE COORDINATION | Facility: CLINIC | Age: 73
End: 2023-01-09

## 2023-01-09 NOTE — PROGRESS NOTES
Clinic Care Coordination Contact    Clinic Care Coordination Contact  OUTREACH    Referral Information:  Referral Source: Care Team         Chief Complaint   Patient presents with     Clinic Care Coordination - Initial        Universal Utilization:   Clinic Utilization  Difficulty keeping appointments:: No  Compliance Concerns: No  Utilization    Hospital Admissions  0             ED Visits  0             No Show Count (past year)  0                Current as of: 1/6/2023 10:27 PM              Clinical Concerns:  Current Medical Concerns:Multiple Sclerosis,   Mastodynia of left breast  Current Behavioral Concerns: NA    Education Provided to patient: discussed home care agencies      Health Maintenance Reviewed: Not assessed  Clinical Pathway: None    Medication Management:  Medication review status: Medications reviewed and no changes reported per patient.             Functional Status:  Dependent ADLs:: Wheelchair-with assist, Incontinence, Transfers, Bathing, Dressing, Toileting  Bed or wheelchair confined:: Yes  Mobility Status: Dependent/Assisted by Another  Fallen 2 or more times in the past year?: No  Any fall with injury in the past year?: No    Living Situation:  Current living arrangement:: I live in a private home with family  Type of residence:: Apartment - handicap accessible    Lifestyle & Psychosocial Needs:PC to Mariela to discuss home care needs.    Mariela is a 72 year old woman with MS. She is wheelchair bound and requires a lift for getting up and out  of chair. Pt is  and they own their own condo. Mariela stated she had home care for a.m.  and p.m. for several years but the agency ran out of  staff to help her. She report sshe needs help in the a.m. to get ready, get to the bathroom and shower. She also needs help in the p.m to change clothes, and get ready for bed.  helps to the degree in which he can but cannot help her toilet / shower. Pt reports she has not had help in 8  days and has not been able to use the bathroom. She believes shes developed a UTI but reports to have antibiotics for it.    Pt states she has an agency coming out today and is hopeful it's a good fit. Pt would like resources for additional agencies who can provide support. [facility] will email her some options. Pt reports her LTC insurance will pay for supports for another 6 months and after that, it will be private pay. Pt had applied for MA in the past but did not qualify based on income.     [facility] emailed:  Ned Sierra,    Here are a few places that may be able to support you in additional home care services.    [facility]  [email or web address]    [facility]  [email or web address]    [facility]  [email or web address]    [facility]  [email or web address]    [facility]  [email or web address]    [email or web address].com    I hope these are helpful! Please call with questions.    Social Determinants of Health     Tobacco Use: Not on file   Alcohol Use: Not on file   Financial Resource Strain: Not on file   Food Insecurity: Not on file   Transportation Needs: Not on file   Physical Activity: Not on file   Stress: Not on file   Social Connections: Not on file   Intimate Partner Violence: Not on file   Depression: Not on file   Housing Stability: Not on file     Diet:: Regular  Tube Feeding: No  Transportation means:: Regular car     Hindu or spiritual beliefs that impact treatment:: No  Mental health DX:: No  Chemical Dependency Status: No Current Concerns  Informal Support system:: Family             Resources and Interventions:  Current Resources:   Skilled Home Care Services: Home Health Aid     Supplies Currently Used at Home: Incontinence Supplies  Equipment Currently Used at Home: wheelchair, manual, lift device  Employment Status: disabled, retired          Advance Care Plan/Directive  Advanced Care Plans/Directives on file:: No    Referrals Placed: Home Care         Care Plan:      Patient/Caregiver understanding:     Outreach Frequency: weekly      Plan: Pt to begin new home care agency today, Pt to review emailed options. [facility] to outreach in a week to assess for needs.

## 2023-01-17 ENCOUNTER — ANESTHESIA EVENT (OUTPATIENT)
Dept: SURGERY | Facility: CLINIC | Age: 73
End: 2023-01-17
Payer: COMMERCIAL

## 2023-01-17 RX ORDER — CAPSAICIN 0.03 G/100G
1 PATCH TOPICAL DAILY
COMMUNITY

## 2023-01-17 RX ORDER — METHENAMINE HIPPURATE 1000 MG/1
1 TABLET ORAL 2 TIMES DAILY
COMMUNITY

## 2023-01-17 RX ORDER — DIAZEPAM 5 MG
5 TABLET ORAL EVERY 6 HOURS PRN
COMMUNITY

## 2023-01-17 RX ORDER — MULTIVIT-MIN/FA/LYCOPEN/LUTEIN .4-300-25
1 TABLET ORAL EVERY MORNING
COMMUNITY

## 2023-01-17 NOTE — PROGRESS NOTES
PTA medications updated by Medication Scribe prior to surgery via phone call with patient (last doses completed by Nurse)     Medication history sources: Patient, Surescrishaan and ([facility] progress note)  In the past week, patient estimated taking medication this percent of the time: Greater than 90%  Adherence assessment: N/A Not Observed    Significant changes made to the medication list:  Patient reports no longer taking the following meds (med scribe removed from PTA med list): Norvasc,  Citracal+ D, cephalexin, Miralax, Zoloft, B-1, Norco      Additional medication history information:   None    Medication reconciliation completed by provider prior to medication history? No    Time spent in this activity: 60 minutes    The information provided in this note is only as accurate as the sources available at the time of update(s)      Prior to Admission medications    Medication Sig Last Dose Taking? Auth Provider Long Term End Date   anastrozole (ARIMIDEX) 1 MG tablet Take 1 mg by mouth daily 1/17/2023 at PM Yes Unknown, Entered By History Yes    baclofen (LIORESAL) 10 MG tablet Take 30 mg by mouth 4 times daily (3 x 10 mg = 30 mg)  at AM Yes Zoie Arrington PA-C     Capsaicin (SALONPAS-HOT) 0.025 % PTCH Externally apply 1 patch topically daily 1/17/2023 at PM Yes Reported, Patient     Cholecalciferol 100 MCG (4000 UT) CAPS Take 4,000 Units by mouth 2 times daily  at AM Yes Unknown, Entered By History     cyanocobalamin (VITAMIN B-12) 1000 MCG tablet Take 1,000 mcg by mouth daily 1/17/2023 at PM Yes Unknown, Entered By History     diazepam (VALIUM) 5 MG tablet Take 5 mg by mouth every 6 hours as needed for anxiety 1/15/2023 at PRN Yes Reported, Patient No    methenamine hippurate (HIPREX) 1 g tablet Take 1 g by mouth 2 times daily  at PM Yes Reported, Patient     Multiple Vitamins-Minerals (CERTAVITE SENIOR/ANTIOXIDANT) TABS Take 1 tablet by mouth every morning 1/7/2023 at AM Yes Reported,  Patient     pramipexole (MIRAPEX) 0.25 MG tablet Take 0.25 mg by mouth At Bedtime  at HS Yes Unknown, Entered By History Yes    traMADol (ULTRAM) 50 MG tablet Take 1 tablet (50 mg) by mouth 2 times daily as needed for moderate pain  at AM Yes Zoie Arrington PA-C

## 2023-01-18 ENCOUNTER — APPOINTMENT (OUTPATIENT)
Dept: GENERAL RADIOLOGY | Facility: CLINIC | Age: 73
End: 2023-01-18
Attending: ORTHOPAEDIC SURGERY
Payer: COMMERCIAL

## 2023-01-18 ENCOUNTER — HOSPITAL ENCOUNTER (OUTPATIENT)
Facility: CLINIC | Age: 73
Discharge: HOME | End: 2023-01-19
Attending: ORTHOPAEDIC SURGERY | Admitting: ORTHOPAEDIC SURGERY
Payer: COMMERCIAL

## 2023-01-18 ENCOUNTER — ANESTHESIA (OUTPATIENT)
Dept: SURGERY | Facility: CLINIC | Age: 73
End: 2023-01-18
Payer: COMMERCIAL

## 2023-01-18 DIAGNOSIS — Z98.1 S/P CERVICAL SPINAL FUSION: Primary | ICD-10-CM

## 2023-01-18 LAB — LACTATE SERPL-SCNC: 1 MMOL/L (ref 0.7–2)

## 2023-01-18 PROCEDURE — 250N000011 HC RX IP 250 OP 636: Performed by: ORTHOPAEDIC SURGERY

## 2023-01-18 PROCEDURE — 258N000003 HC RX IP 258 OP 636

## 2023-01-18 PROCEDURE — C1713 ANCHOR/SCREW BN/BN,TIS/BN: HCPCS | Performed by: ORTHOPAEDIC SURGERY

## 2023-01-18 PROCEDURE — 250N000009 HC RX 250: Performed by: NURSE ANESTHETIST, CERTIFIED REGISTERED

## 2023-01-18 PROCEDURE — 250N000013 HC RX MED GY IP 250 OP 250 PS 637: Performed by: ORTHOPAEDIC SURGERY

## 2023-01-18 PROCEDURE — 93010 ELECTROCARDIOGRAM REPORT: CPT | Performed by: INTERNAL MEDICINE

## 2023-01-18 PROCEDURE — 250N000011 HC RX IP 250 OP 636: Performed by: ANESTHESIOLOGY

## 2023-01-18 PROCEDURE — 250N000011 HC RX IP 250 OP 636: Performed by: NURSE ANESTHETIST, CERTIFIED REGISTERED

## 2023-01-18 PROCEDURE — 999N000141 HC STATISTIC PRE-PROCEDURE NURSING ASSESSMENT: Performed by: ORTHOPAEDIC SURGERY

## 2023-01-18 PROCEDURE — 250N000009 HC RX 250: Performed by: ORTHOPAEDIC SURGERY

## 2023-01-18 PROCEDURE — 250N000011 HC RX IP 250 OP 636: Performed by: STUDENT IN AN ORGANIZED HEALTH CARE EDUCATION/TRAINING PROGRAM

## 2023-01-18 PROCEDURE — 710N000009 HC RECOVERY PHASE 1, LEVEL 1, PER MIN: Performed by: ORTHOPAEDIC SURGERY

## 2023-01-18 PROCEDURE — 250N000025 HC SEVOFLURANE, PER MIN: Performed by: ORTHOPAEDIC SURGERY

## 2023-01-18 PROCEDURE — C1762 CONN TISS, HUMAN(INC FASCIA): HCPCS | Performed by: ORTHOPAEDIC SURGERY

## 2023-01-18 PROCEDURE — 36415 COLL VENOUS BLD VENIPUNCTURE: CPT | Performed by: ORTHOPAEDIC SURGERY

## 2023-01-18 PROCEDURE — 250N000011 HC RX IP 250 OP 636

## 2023-01-18 PROCEDURE — 93005 ELECTROCARDIOGRAM TRACING: CPT

## 2023-01-18 PROCEDURE — 258N000003 HC RX IP 258 OP 636: Performed by: STUDENT IN AN ORGANIZED HEALTH CARE EDUCATION/TRAINING PROGRAM

## 2023-01-18 PROCEDURE — 250N000009 HC RX 250

## 2023-01-18 PROCEDURE — 250N000013 HC RX MED GY IP 250 OP 250 PS 637: Performed by: STUDENT IN AN ORGANIZED HEALTH CARE EDUCATION/TRAINING PROGRAM

## 2023-01-18 PROCEDURE — 370N000017 HC ANESTHESIA TECHNICAL FEE, PER MIN: Performed by: ORTHOPAEDIC SURGERY

## 2023-01-18 PROCEDURE — 999N000054 HC STATISTIC EKG NON-CHARGEABLE

## 2023-01-18 PROCEDURE — 99221 1ST HOSP IP/OBS SF/LOW 40: CPT | Performed by: PHYSICIAN ASSISTANT

## 2023-01-18 PROCEDURE — 272N000001 HC OR GENERAL SUPPLY STERILE: Performed by: ORTHOPAEDIC SURGERY

## 2023-01-18 PROCEDURE — 360N000084 HC SURGERY LEVEL 4 W/ FLUORO, PER MIN: Performed by: ORTHOPAEDIC SURGERY

## 2023-01-18 PROCEDURE — 83605 ASSAY OF LACTIC ACID: CPT | Performed by: ORTHOPAEDIC SURGERY

## 2023-01-18 PROCEDURE — 922N000001 HC NEURO MONITORING SERVICE, UP TO 7 HOURS (T1FEE): Performed by: ORTHOPAEDIC SURGERY

## 2023-01-18 PROCEDURE — 258N000003 HC RX IP 258 OP 636: Performed by: ANESTHESIOLOGY

## 2023-01-18 PROCEDURE — 272N000282 HC OR IOM SUPPLIES OPNP: Performed by: ORTHOPAEDIC SURGERY

## 2023-01-18 PROCEDURE — 999N000179 XR SURGERY CARM FLUORO LESS THAN 5 MIN W STILLS

## 2023-01-18 DEVICE — GRAFT BONE ACCELERATE GRAFTON 2 CANNULA SET 3CC T50203: Type: IMPLANTABLE DEVICE | Site: SPINE CERVICAL | Status: FUNCTIONAL

## 2023-01-18 RX ORDER — ANASTROZOLE 1 MG/1
1 TABLET ORAL DAILY
Status: DISCONTINUED | OUTPATIENT
Start: 2023-01-18 | End: 2023-01-19 | Stop reason: HOSPADM

## 2023-01-18 RX ORDER — ACETAMINOPHEN 325 MG/1
650 TABLET ORAL EVERY 4 HOURS PRN
Status: DISCONTINUED | OUTPATIENT
Start: 2023-01-21 | End: 2023-01-19 | Stop reason: HOSPADM

## 2023-01-18 RX ORDER — CEFAZOLIN SODIUM 1 G/3ML
1 INJECTION, POWDER, FOR SOLUTION INTRAMUSCULAR; INTRAVENOUS EVERY 8 HOURS
Status: COMPLETED | OUTPATIENT
Start: 2023-01-18 | End: 2023-01-19

## 2023-01-18 RX ORDER — ONDANSETRON 4 MG/1
4 TABLET, ORALLY DISINTEGRATING ORAL EVERY 30 MIN PRN
Status: DISCONTINUED | OUTPATIENT
Start: 2023-01-18 | End: 2023-01-18

## 2023-01-18 RX ORDER — LIDOCAINE HYDROCHLORIDE 20 MG/ML
INJECTION, SOLUTION INFILTRATION; PERINEURAL PRN
Status: DISCONTINUED | OUTPATIENT
Start: 2023-01-18 | End: 2023-01-18

## 2023-01-18 RX ORDER — FENTANYL CITRATE 0.05 MG/ML
25 INJECTION, SOLUTION INTRAMUSCULAR; INTRAVENOUS EVERY 5 MIN PRN
Status: DISCONTINUED | OUTPATIENT
Start: 2023-01-18 | End: 2023-01-18

## 2023-01-18 RX ORDER — SODIUM CHLORIDE, SODIUM LACTATE, POTASSIUM CHLORIDE, CALCIUM CHLORIDE 600; 310; 30; 20 MG/100ML; MG/100ML; MG/100ML; MG/100ML
INJECTION, SOLUTION INTRAVENOUS CONTINUOUS
Status: DISCONTINUED | OUTPATIENT
Start: 2023-01-18 | End: 2023-01-18

## 2023-01-18 RX ORDER — HYDROMORPHONE HCL IN WATER/PF 6 MG/30 ML
0.4 PATIENT CONTROLLED ANALGESIA SYRINGE INTRAVENOUS EVERY 5 MIN PRN
Status: DISCONTINUED | OUTPATIENT
Start: 2023-01-18 | End: 2023-01-18 | Stop reason: HOSPADM

## 2023-01-18 RX ORDER — DIAZEPAM 5 MG
5 TABLET ORAL EVERY 6 HOURS PRN
Status: DISCONTINUED | OUTPATIENT
Start: 2023-01-18 | End: 2023-01-19 | Stop reason: HOSPADM

## 2023-01-18 RX ORDER — ONDANSETRON 2 MG/ML
INJECTION INTRAMUSCULAR; INTRAVENOUS PRN
Status: DISCONTINUED | OUTPATIENT
Start: 2023-01-18 | End: 2023-01-18

## 2023-01-18 RX ORDER — MEPERIDINE HYDROCHLORIDE 25 MG/ML
12.5 INJECTION INTRAMUSCULAR; INTRAVENOUS; SUBCUTANEOUS EVERY 5 MIN PRN
Status: DISCONTINUED | OUTPATIENT
Start: 2023-01-18 | End: 2023-01-18

## 2023-01-18 RX ORDER — ACETAMINOPHEN 325 MG/1
975 TABLET ORAL EVERY 8 HOURS
Status: DISCONTINUED | OUTPATIENT
Start: 2023-01-18 | End: 2023-01-19 | Stop reason: HOSPADM

## 2023-01-18 RX ORDER — LABETALOL HYDROCHLORIDE 5 MG/ML
10 INJECTION, SOLUTION INTRAVENOUS
Status: DISCONTINUED | OUTPATIENT
Start: 2023-01-18 | End: 2023-01-18 | Stop reason: HOSPADM

## 2023-01-18 RX ORDER — FENTANYL CITRATE 0.05 MG/ML
50 INJECTION, SOLUTION INTRAMUSCULAR; INTRAVENOUS EVERY 5 MIN PRN
Status: DISCONTINUED | OUTPATIENT
Start: 2023-01-18 | End: 2023-01-18

## 2023-01-18 RX ORDER — HYDROMORPHONE HCL IN WATER/PF 6 MG/30 ML
0.2 PATIENT CONTROLLED ANALGESIA SYRINGE INTRAVENOUS EVERY 5 MIN PRN
Status: DISCONTINUED | OUTPATIENT
Start: 2023-01-18 | End: 2023-01-18 | Stop reason: HOSPADM

## 2023-01-18 RX ORDER — HYDROMORPHONE HCL IN WATER/PF 6 MG/30 ML
0.2 PATIENT CONTROLLED ANALGESIA SYRINGE INTRAVENOUS
Status: DISCONTINUED | OUTPATIENT
Start: 2023-01-18 | End: 2023-01-19 | Stop reason: HOSPADM

## 2023-01-18 RX ORDER — ONDANSETRON 2 MG/ML
4 INJECTION INTRAMUSCULAR; INTRAVENOUS EVERY 30 MIN PRN
Status: DISCONTINUED | OUTPATIENT
Start: 2023-01-18 | End: 2023-01-18

## 2023-01-18 RX ORDER — HYDROMORPHONE HCL IN WATER/PF 6 MG/30 ML
0.4 PATIENT CONTROLLED ANALGESIA SYRINGE INTRAVENOUS
Status: DISCONTINUED | OUTPATIENT
Start: 2023-01-18 | End: 2023-01-19 | Stop reason: HOSPADM

## 2023-01-18 RX ORDER — POLYETHYLENE GLYCOL 3350 17 G/17G
17 POWDER, FOR SOLUTION ORAL DAILY
Status: DISCONTINUED | OUTPATIENT
Start: 2023-01-19 | End: 2023-01-19 | Stop reason: HOSPADM

## 2023-01-18 RX ORDER — MEPERIDINE HYDROCHLORIDE 25 MG/ML
12.5 INJECTION INTRAMUSCULAR; INTRAVENOUS; SUBCUTANEOUS EVERY 5 MIN PRN
Status: DISCONTINUED | OUTPATIENT
Start: 2023-01-18 | End: 2023-01-18 | Stop reason: HOSPADM

## 2023-01-18 RX ORDER — PROPOFOL 10 MG/ML
INJECTION, EMULSION INTRAVENOUS PRN
Status: DISCONTINUED | OUTPATIENT
Start: 2023-01-18 | End: 2023-01-18

## 2023-01-18 RX ORDER — OXYCODONE HYDROCHLORIDE 5 MG/1
10 TABLET ORAL EVERY 4 HOURS PRN
Status: DISCONTINUED | OUTPATIENT
Start: 2023-01-18 | End: 2023-01-19 | Stop reason: HOSPADM

## 2023-01-18 RX ORDER — HYDROMORPHONE HCL IN WATER/PF 6 MG/30 ML
0.2 PATIENT CONTROLLED ANALGESIA SYRINGE INTRAVENOUS EVERY 5 MIN PRN
Status: DISCONTINUED | OUTPATIENT
Start: 2023-01-18 | End: 2023-01-18

## 2023-01-18 RX ORDER — SODIUM CHLORIDE, SODIUM LACTATE, POTASSIUM CHLORIDE, CALCIUM CHLORIDE 600; 310; 30; 20 MG/100ML; MG/100ML; MG/100ML; MG/100ML
INJECTION, SOLUTION INTRAVENOUS CONTINUOUS
Status: DISCONTINUED | OUTPATIENT
Start: 2023-01-18 | End: 2023-01-18 | Stop reason: HOSPADM

## 2023-01-18 RX ORDER — CAPSAICIN 0.03 G/100G
1 PATCH TOPICAL DAILY PRN
Status: DISCONTINUED | OUTPATIENT
Start: 2023-01-18 | End: 2023-01-18 | Stop reason: ALTCHOICE

## 2023-01-18 RX ORDER — BACLOFEN 10 MG/1
30 TABLET ORAL 4 TIMES DAILY
Status: DISCONTINUED | OUTPATIENT
Start: 2023-01-18 | End: 2023-01-19 | Stop reason: HOSPADM

## 2023-01-18 RX ORDER — LANOLIN ALCOHOL/MO/W.PET/CERES
1000 CREAM (GRAM) TOPICAL DAILY
Status: DISCONTINUED | OUTPATIENT
Start: 2023-01-18 | End: 2023-01-19 | Stop reason: HOSPADM

## 2023-01-18 RX ORDER — FENTANYL CITRATE 0.05 MG/ML
25 INJECTION, SOLUTION INTRAMUSCULAR; INTRAVENOUS EVERY 5 MIN PRN
Status: DISCONTINUED | OUTPATIENT
Start: 2023-01-18 | End: 2023-01-18 | Stop reason: HOSPADM

## 2023-01-18 RX ORDER — ONDANSETRON 4 MG/1
4 TABLET, ORALLY DISINTEGRATING ORAL EVERY 30 MIN PRN
Status: DISCONTINUED | OUTPATIENT
Start: 2023-01-18 | End: 2023-01-18 | Stop reason: HOSPADM

## 2023-01-18 RX ORDER — ONDANSETRON 2 MG/ML
4 INJECTION INTRAMUSCULAR; INTRAVENOUS EVERY 30 MIN PRN
Status: DISCONTINUED | OUTPATIENT
Start: 2023-01-18 | End: 2023-01-18 | Stop reason: HOSPADM

## 2023-01-18 RX ORDER — GABAPENTIN 100 MG/1
100 CAPSULE ORAL
Status: COMPLETED | OUTPATIENT
Start: 2023-01-18 | End: 2023-01-18

## 2023-01-18 RX ORDER — NALOXONE HYDROCHLORIDE 0.4 MG/ML
0.2 INJECTION, SOLUTION INTRAMUSCULAR; INTRAVENOUS; SUBCUTANEOUS
Status: DISCONTINUED | OUTPATIENT
Start: 2023-01-18 | End: 2023-01-19 | Stop reason: HOSPADM

## 2023-01-18 RX ORDER — NALOXONE HYDROCHLORIDE 0.4 MG/ML
0.4 INJECTION, SOLUTION INTRAMUSCULAR; INTRAVENOUS; SUBCUTANEOUS
Status: DISCONTINUED | OUTPATIENT
Start: 2023-01-18 | End: 2023-01-19 | Stop reason: HOSPADM

## 2023-01-18 RX ORDER — LIDOCAINE 40 MG/G
CREAM TOPICAL
Status: DISCONTINUED | OUTPATIENT
Start: 2023-01-18 | End: 2023-01-19 | Stop reason: HOSPADM

## 2023-01-18 RX ORDER — HYDROMORPHONE HCL IN WATER/PF 6 MG/30 ML
0.4 PATIENT CONTROLLED ANALGESIA SYRINGE INTRAVENOUS EVERY 5 MIN PRN
Status: DISCONTINUED | OUTPATIENT
Start: 2023-01-18 | End: 2023-01-18

## 2023-01-18 RX ORDER — CHOLECALCIFEROL (VITAMIN D3) 50 MCG
4000 TABLET ORAL 2 TIMES DAILY
Status: DISCONTINUED | OUTPATIENT
Start: 2023-01-18 | End: 2023-01-19 | Stop reason: HOSPADM

## 2023-01-18 RX ORDER — DEXAMETHASONE SODIUM PHOSPHATE 4 MG/ML
4 INJECTION, SOLUTION INTRA-ARTICULAR; INTRALESIONAL; INTRAMUSCULAR; INTRAVENOUS; SOFT TISSUE EVERY 6 HOURS
Status: COMPLETED | OUTPATIENT
Start: 2023-01-18 | End: 2023-01-19

## 2023-01-18 RX ORDER — HYDRALAZINE HYDROCHLORIDE 20 MG/ML
2.5-5 INJECTION INTRAMUSCULAR; INTRAVENOUS EVERY 10 MIN PRN
Status: DISCONTINUED | OUTPATIENT
Start: 2023-01-18 | End: 2023-01-18

## 2023-01-18 RX ORDER — CEFAZOLIN SODIUM/WATER 2 G/20 ML
2 SYRINGE (ML) INTRAVENOUS
Status: COMPLETED | OUTPATIENT
Start: 2023-01-18 | End: 2023-01-18

## 2023-01-18 RX ORDER — DEXAMETHASONE SODIUM PHOSPHATE 10 MG/ML
10 INJECTION, SOLUTION INTRAMUSCULAR; INTRAVENOUS ONCE
Status: DISCONTINUED | OUTPATIENT
Start: 2023-01-18 | End: 2023-01-18 | Stop reason: HOSPADM

## 2023-01-18 RX ORDER — ONDANSETRON 2 MG/ML
4 INJECTION INTRAMUSCULAR; INTRAVENOUS EVERY 6 HOURS PRN
Status: DISCONTINUED | OUTPATIENT
Start: 2023-01-18 | End: 2023-01-19 | Stop reason: HOSPADM

## 2023-01-18 RX ORDER — ONDANSETRON 4 MG/1
4 TABLET, ORALLY DISINTEGRATING ORAL EVERY 6 HOURS PRN
Status: DISCONTINUED | OUTPATIENT
Start: 2023-01-18 | End: 2023-01-19 | Stop reason: HOSPADM

## 2023-01-18 RX ORDER — METHENAMINE HIPPURATE 1000 MG/1
1 TABLET ORAL 2 TIMES DAILY
Status: DISCONTINUED | OUTPATIENT
Start: 2023-01-18 | End: 2023-01-19 | Stop reason: HOSPADM

## 2023-01-18 RX ORDER — OXYCODONE HYDROCHLORIDE 5 MG/1
5 TABLET ORAL EVERY 4 HOURS PRN
Status: DISCONTINUED | OUTPATIENT
Start: 2023-01-18 | End: 2023-01-19 | Stop reason: HOSPADM

## 2023-01-18 RX ORDER — PROCHLORPERAZINE MALEATE 5 MG
5 TABLET ORAL EVERY 6 HOURS PRN
Status: DISCONTINUED | OUTPATIENT
Start: 2023-01-18 | End: 2023-01-19 | Stop reason: HOSPADM

## 2023-01-18 RX ORDER — PRAMIPEXOLE DIHYDROCHLORIDE 0.25 MG/1
0.25 TABLET ORAL AT BEDTIME
Status: DISCONTINUED | OUTPATIENT
Start: 2023-01-18 | End: 2023-01-19 | Stop reason: HOSPADM

## 2023-01-18 RX ORDER — PROPOFOL 10 MG/ML
INJECTION, EMULSION INTRAVENOUS CONTINUOUS PRN
Status: DISCONTINUED | OUTPATIENT
Start: 2023-01-18 | End: 2023-01-18

## 2023-01-18 RX ORDER — FENTANYL CITRATE 0.05 MG/ML
50 INJECTION, SOLUTION INTRAMUSCULAR; INTRAVENOUS EVERY 5 MIN PRN
Status: DISCONTINUED | OUTPATIENT
Start: 2023-01-18 | End: 2023-01-18 | Stop reason: HOSPADM

## 2023-01-18 RX ORDER — HYDRALAZINE HYDROCHLORIDE 20 MG/ML
2.5-5 INJECTION INTRAMUSCULAR; INTRAVENOUS EVERY 10 MIN PRN
Status: DISCONTINUED | OUTPATIENT
Start: 2023-01-18 | End: 2023-01-18 | Stop reason: HOSPADM

## 2023-01-18 RX ORDER — SODIUM CHLORIDE, SODIUM LACTATE, POTASSIUM CHLORIDE, CALCIUM CHLORIDE 600; 310; 30; 20 MG/100ML; MG/100ML; MG/100ML; MG/100ML
INJECTION, SOLUTION INTRAVENOUS CONTINUOUS
Status: DISCONTINUED | OUTPATIENT
Start: 2023-01-18 | End: 2023-01-19 | Stop reason: HOSPADM

## 2023-01-18 RX ORDER — FENTANYL CITRATE 50 UG/ML
INJECTION, SOLUTION INTRAMUSCULAR; INTRAVENOUS PRN
Status: DISCONTINUED | OUTPATIENT
Start: 2023-01-18 | End: 2023-01-18

## 2023-01-18 RX ORDER — CALCIUM CARBONATE 500 MG/1
500 TABLET, CHEWABLE ORAL 4 TIMES DAILY PRN
Status: DISCONTINUED | OUTPATIENT
Start: 2023-01-18 | End: 2023-01-19 | Stop reason: HOSPADM

## 2023-01-18 RX ORDER — MAGNESIUM HYDROXIDE 1200 MG/15ML
LIQUID ORAL PRN
Status: DISCONTINUED | OUTPATIENT
Start: 2023-01-18 | End: 2023-01-18 | Stop reason: HOSPADM

## 2023-01-18 RX ORDER — BISACODYL 10 MG
10 SUPPOSITORY, RECTAL RECTAL DAILY PRN
Status: DISCONTINUED | OUTPATIENT
Start: 2023-01-18 | End: 2023-01-19 | Stop reason: HOSPADM

## 2023-01-18 RX ORDER — LABETALOL HYDROCHLORIDE 5 MG/ML
10 INJECTION, SOLUTION INTRAVENOUS
Status: DISCONTINUED | OUTPATIENT
Start: 2023-01-18 | End: 2023-01-18

## 2023-01-18 RX ORDER — ALBUTEROL SULFATE 0.83 MG/ML
2.5 SOLUTION RESPIRATORY (INHALATION) EVERY 4 HOURS PRN
Status: DISCONTINUED | OUTPATIENT
Start: 2023-01-18 | End: 2023-01-18

## 2023-01-18 RX ORDER — AMOXICILLIN 250 MG
1 CAPSULE ORAL 2 TIMES DAILY
Status: DISCONTINUED | OUTPATIENT
Start: 2023-01-18 | End: 2023-01-19 | Stop reason: HOSPADM

## 2023-01-18 RX ORDER — SODIUM CHLORIDE, SODIUM LACTATE, POTASSIUM CHLORIDE, CALCIUM CHLORIDE 600; 310; 30; 20 MG/100ML; MG/100ML; MG/100ML; MG/100ML
INJECTION, SOLUTION INTRAVENOUS CONTINUOUS PRN
Status: DISCONTINUED | OUTPATIENT
Start: 2023-01-18 | End: 2023-01-18

## 2023-01-18 RX ORDER — CEFAZOLIN SODIUM/WATER 2 G/20 ML
2 SYRINGE (ML) INTRAVENOUS SEE ADMIN INSTRUCTIONS
Status: DISCONTINUED | OUTPATIENT
Start: 2023-01-18 | End: 2023-01-18 | Stop reason: HOSPADM

## 2023-01-18 RX ORDER — HYDROXYZINE HYDROCHLORIDE 10 MG/1
10 TABLET, FILM COATED ORAL EVERY 6 HOURS PRN
Status: DISCONTINUED | OUTPATIENT
Start: 2023-01-18 | End: 2023-01-19 | Stop reason: HOSPADM

## 2023-01-18 RX ORDER — GLYCOPYRROLATE 0.2 MG/ML
INJECTION, SOLUTION INTRAMUSCULAR; INTRAVENOUS PRN
Status: DISCONTINUED | OUTPATIENT
Start: 2023-01-18 | End: 2023-01-18

## 2023-01-18 RX ORDER — ALBUTEROL SULFATE 0.83 MG/ML
2.5 SOLUTION RESPIRATORY (INHALATION) EVERY 4 HOURS PRN
Status: DISCONTINUED | OUTPATIENT
Start: 2023-01-18 | End: 2023-01-18 | Stop reason: HOSPADM

## 2023-01-18 RX ORDER — BUPIVACAINE HYDROCHLORIDE AND EPINEPHRINE 5; 5 MG/ML; UG/ML
INJECTION, SOLUTION EPIDURAL; INTRACAUDAL; PERINEURAL
Status: DISCONTINUED
Start: 2023-01-18 | End: 2023-01-18 | Stop reason: HOSPADM

## 2023-01-18 RX ADMIN — METHENAMINE HIPPURATE 1 G: 1 TABLET ORAL at 20:16

## 2023-01-18 RX ADMIN — Medication 2 G: at 10:45

## 2023-01-18 RX ADMIN — SODIUM CHLORIDE, POTASSIUM CHLORIDE, SODIUM LACTATE AND CALCIUM CHLORIDE: 600; 310; 30; 20 INJECTION, SOLUTION INTRAVENOUS at 10:50

## 2023-01-18 RX ADMIN — HYDROMORPHONE HYDROCHLORIDE 0.5 MG: 1 INJECTION, SOLUTION INTRAMUSCULAR; INTRAVENOUS; SUBCUTANEOUS at 12:58

## 2023-01-18 RX ADMIN — GLYCOPYRROLATE 0.2 MG: 0.2 INJECTION, SOLUTION INTRAMUSCULAR; INTRAVENOUS at 12:06

## 2023-01-18 RX ADMIN — Medication 4000 UNITS: at 20:15

## 2023-01-18 RX ADMIN — ONDANSETRON 4 MG: 2 INJECTION INTRAMUSCULAR; INTRAVENOUS at 13:01

## 2023-01-18 RX ADMIN — PROPOFOL 100 MG: 10 INJECTION, EMULSION INTRAVENOUS at 10:43

## 2023-01-18 RX ADMIN — PHENYLEPHRINE HYDROCHLORIDE 100 MCG: 10 INJECTION INTRAVENOUS at 11:05

## 2023-01-18 RX ADMIN — CYANOCOBALAMIN TAB 1000 MCG 1000 MCG: 1000 TAB at 20:16

## 2023-01-18 RX ADMIN — FENTANYL CITRATE 50 MCG: 0.05 INJECTION, SOLUTION INTRAMUSCULAR; INTRAVENOUS at 15:23

## 2023-01-18 RX ADMIN — FENTANYL CITRATE 50 MCG: 50 INJECTION, SOLUTION INTRAMUSCULAR; INTRAVENOUS at 13:50

## 2023-01-18 RX ADMIN — SODIUM CHLORIDE, POTASSIUM CHLORIDE, SODIUM LACTATE AND CALCIUM CHLORIDE: 600; 310; 30; 20 INJECTION, SOLUTION INTRAVENOUS at 08:34

## 2023-01-18 RX ADMIN — SUGAMMADEX 200 MG: 100 INJECTION, SOLUTION INTRAVENOUS at 13:31

## 2023-01-18 RX ADMIN — PROPOFOL 150 MCG/KG/MIN: 10 INJECTION, EMULSION INTRAVENOUS at 10:44

## 2023-01-18 RX ADMIN — SODIUM CHLORIDE, POTASSIUM CHLORIDE, SODIUM LACTATE AND CALCIUM CHLORIDE: 600; 310; 30; 20 INJECTION, SOLUTION INTRAVENOUS at 20:13

## 2023-01-18 RX ADMIN — BACLOFEN 30 MG: 10 TABLET ORAL at 23:12

## 2023-01-18 RX ADMIN — OXYCODONE HYDROCHLORIDE 5 MG: 5 TABLET ORAL at 18:33

## 2023-01-18 RX ADMIN — ROCURONIUM BROMIDE 25 MG: 50 INJECTION, SOLUTION INTRAVENOUS at 10:43

## 2023-01-18 RX ADMIN — REMIFENTANIL HYDROCHLORIDE 0.1 MCG/KG/MIN: 1 INJECTION, POWDER, LYOPHILIZED, FOR SOLUTION INTRAVENOUS at 10:44

## 2023-01-18 RX ADMIN — PHENYLEPHRINE HYDROCHLORIDE 0.3 MCG/KG/MIN: 10 INJECTION INTRAVENOUS at 11:15

## 2023-01-18 RX ADMIN — SENNOSIDES AND DOCUSATE SODIUM 1 TABLET: 50; 8.6 TABLET ORAL at 20:16

## 2023-01-18 RX ADMIN — PHENYLEPHRINE HYDROCHLORIDE 100 MCG: 10 INJECTION INTRAVENOUS at 11:58

## 2023-01-18 RX ADMIN — ANASTROZOLE 1 MG: 1 TABLET, COATED ORAL at 20:16

## 2023-01-18 RX ADMIN — BACLOFEN 30 MG: 10 TABLET ORAL at 17:42

## 2023-01-18 RX ADMIN — LIDOCAINE HYDROCHLORIDE 60 MG: 20 INJECTION, SOLUTION INFILTRATION; PERINEURAL at 10:43

## 2023-01-18 RX ADMIN — DEXAMETHASONE SODIUM PHOSPHATE 4 MG: 4 INJECTION, SOLUTION INTRAMUSCULAR; INTRAVENOUS at 23:12

## 2023-01-18 RX ADMIN — MIDAZOLAM 1 MG: 1 INJECTION INTRAMUSCULAR; INTRAVENOUS at 10:34

## 2023-01-18 RX ADMIN — CEFAZOLIN 1 G: 1 INJECTION, POWDER, FOR SOLUTION INTRAMUSCULAR; INTRAVENOUS at 17:50

## 2023-01-18 RX ADMIN — HYDROMORPHONE HYDROCHLORIDE 0.4 MG: 0.2 INJECTION, SOLUTION INTRAMUSCULAR; INTRAVENOUS; SUBCUTANEOUS at 20:19

## 2023-01-18 RX ADMIN — DEXAMETHASONE SODIUM PHOSPHATE 4 MG: 4 INJECTION, SOLUTION INTRAMUSCULAR; INTRAVENOUS at 17:42

## 2023-01-18 RX ADMIN — ACETAMINOPHEN 975 MG: 325 TABLET, FILM COATED ORAL at 17:41

## 2023-01-18 RX ADMIN — GABAPENTIN 100 MG: 100 CAPSULE ORAL at 08:35

## 2023-01-18 RX ADMIN — FENTANYL CITRATE 50 MCG: 50 INJECTION, SOLUTION INTRAMUSCULAR; INTRAVENOUS at 10:43

## 2023-01-18 RX ADMIN — PHENYLEPHRINE HYDROCHLORIDE 100 MCG: 10 INJECTION INTRAVENOUS at 11:17

## 2023-01-18 RX ADMIN — PRAMIPEXOLE DIHYDROCHLORIDE 0.25 MG: 0.25 TABLET ORAL at 23:12

## 2023-01-18 ASSESSMENT — ACTIVITIES OF DAILY LIVING (ADL)
ADLS_ACUITY_SCORE: 38
ADLS_ACUITY_SCORE: 38
ADLS_ACUITY_SCORE: 35
ADLS_ACUITY_SCORE: 38
ADLS_ACUITY_SCORE: 42
ADLS_ACUITY_SCORE: 38

## 2023-01-18 NOTE — PROVIDER NOTIFICATION
MD Notification    Notified Person: MD/PA     Notified Person Name: Shari Wilcox    Notification Date/Time: 1/18/23  5:50 PM    Notification Interaction: phone/called    Purpose of Notification: elevated BPS at 160's-170's and BPA sepsis fired.     Orders Received: VO for hospitalist consult    Comments:

## 2023-01-18 NOTE — ANESTHESIA POSTPROCEDURE EVALUATION
Patient: Mariela Allen    Procedure: Procedure(s):  cervical 3 to cervical 5 anterior cervical discectomy and fusion       Anesthesia Type:  General    Note:     Postop Pain Control: Uneventful            Sign Out: Well controlled pain   PONV: No   Neuro/Psych: Uneventful            Sign Out: Acceptable/Baseline neuro status   Airway/Respiratory: Uneventful            Sign Out: Acceptable/Baseline resp. status   CV/Hemodynamics: Uneventful            Sign Out: Acceptable CV status; No obvious hypovolemia; No obvious fluid overload   Other NRE:    DID A NON-ROUTINE EVENT OCCUR? No           Last vitals:  Vitals Value Taken Time   /75 01/18/23 1515   Temp     Pulse 89 01/18/23 1528   Resp 8 01/18/23 1528   SpO2 94 % 01/18/23 1528   Vitals shown include unvalidated device data.    Electronically Signed By: Maida Garcia MD, MD  January 18, 2023  3:29 PM

## 2023-01-18 NOTE — INTERVAL H&P NOTE
I have reviewed the surgical (or preoperative) H&P that is linked to this encounter, and examined the patient. There are no significant changes

## 2023-01-18 NOTE — ANESTHESIA CARE TRANSFER NOTE
Patient: Mariela Allen    Procedure: Procedure(s):  cervical 3 to cervical 5 anterior cervical discectomy and fusion       Diagnosis: Spinal stenosis in cervical region [M48.02]  Cervical disc disorder with myelopathy of uuaeotjn-bekscxa-xjdhz region [M50.01]  Displacement of intervertebral disc of mid-cervical region [M50.220]  Diagnosis Additional Information: No value filed.    Anesthesia Type:   General     Note:    Oropharynx: oropharynx clear of all foreign objects and spontaneously breathing  Level of Consciousness: drowsy  Oxygen Supplementation: face mask  Level of Supplemental Oxygen (L/min / FiO2): 6  Independent Airway: airway patency satisfactory and stable  Dentition: dentition unchanged  Vital Signs Stable: post-procedure vital signs reviewed and stable  Report to RN Given: handoff report given  Patient transferred to: PACU  Comments: Neuromuscular blockade reversed with sugammadex, spontaneous respirations, adequate tidal volumes, followed commands to voice, oropharynx suctioned with soft flexible catheter, extubated atraumatically, extubated with suction, airway patent after extubation.  Oxygen via facemask at 6 liters per minute to PACU. Oxygen tubing connected to wall O2 in PACU, SpO2, NiBP, and EKG monitors and alarms on and functioning, Isaiah Hugger warmer connected to patient gown, report on patient's clinical status given to PACU RN, RN questions answered.       Handoff Report: Identifed the Patient, Identified the Reponsible Provider, Reviewed the pertinent medical history, Discussed the surgical course, Reviewed Intra-OP anesthesia mangement and issues during anesthesia, Set expectations for post-procedure period and Allowed opportunity for questions and acknowledgement of understanding      Vitals:  Vitals Value Taken Time   BP     Temp     Pulse     Resp     SpO2         Electronically Signed By: ADRIANA Veliz CRNA  January 18, 2023  1:51 PM

## 2023-01-18 NOTE — ANESTHESIA PREPROCEDURE EVALUATION
Anesthesia Pre-Procedure Evaluation    Patient: Mariela Allen   MRN: [id number] : 1950        Procedure : Procedure(s):  cervical 3 to cervical 5 anterior cervical discectomy and fusion          Past Medical History:   Diagnosis Date     Breast cancer (H)     Invasive ductal carcinoma of Left brst     Cancer (H)     Basel cell Skin cancer      Chronic pain      Clostridium difficile colitis      Hyperlipidemia      Hypertension      MS (multiple sclerosis) (H)      Multiple sclerosis (H)      Nerve pain      Neurogenic bladder      BRITTANY (obstructive sleep apnea)      Recurrent UTI       Past Surgical History:   Procedure Laterality Date     BIOPSY NODE SENTINEL Left 08/15/2018    Procedure: BIOPSY NODE SENTINEL;;  Surgeon: Mariel Boucher MD;  Location: [facility]     BLADDER SURGERY       LUMPECTOMY BREAST WITH SEED LOCALIZATION Left 08/15/2018    Procedure: LUMPECTOMY BREAST WITH SEED LOCALIZATION;  LEFT SEED LOCALIZED BREAST LUMPECTOMY WITH SENTINEL NODE BIOPSY ;  Surgeon: Mariel Boucher MD;  Location: [facility]     ORTHOPEDIC SURGERY      ankle      Allergies   Allergen Reactions     Solu-Medrol [Methylprednisolone]      High Dose caused Delirium      Social History     Tobacco Use     Smoking status: Never     Smokeless tobacco: Never   Substance Use Topics     Alcohol use: Yes     Comment: drinks wine almost daily       Wt Readings from Last 1 Encounters:   23 74.9 kg (165 lb 1.6 oz)        Anesthesia Evaluation   Pt has had prior anesthetic.     No history of anesthetic complications       ROS/MED HX  ENT/Pulmonary:     (+) sleep apnea,     Neurologic:     (+) Multiple Sclerosis, limitations: tetraplegia, spasticity.  (-) no CVA   Cardiovascular:     (+) hypertension----- (-) CAD   METS/Exercise Tolerance:     Hematologic:       Musculoskeletal:       GI/Hepatic:    (-) GERD   Renal/Genitourinary: Comment: Neurogenic bladder      Endo:    (-) Type II DM   Psychiatric/Substance Use:        Infectious Disease:       Malignancy:   (+) Malignancy, History of Breast.    Other:            Physical Exam    Airway        Mallampati: II   TM distance: > 3 FB   Neck ROM: full   Mouth opening: > 3 cm    Respiratory Devices and Support         Dental           Cardiovascular   cardiovascular exam normal          Pulmonary   pulmonary exam normal                OUTSIDE LABS:  CBC:   Lab Results   Component Value Date    WBC 5.5 03/16/2021    WBC 5.3 03/15/2021    HGB 11.9 03/16/2021    HGB 13.6 03/15/2021    HCT 36.6 03/16/2021    HCT 42.4 03/15/2021     03/19/2021     03/16/2021     BMP:   Lab Results   Component Value Date     03/17/2021     (H) 03/16/2021    POTASSIUM 3.7 03/20/2021    POTASSIUM 4.1 03/19/2021    CHLORIDE 103 03/17/2021    CHLORIDE 113 (H) 03/16/2021    CO2 26 03/17/2021    CO2 25 03/16/2021    BUN 13 03/17/2021    BUN 21 03/16/2021    CR 0.82 03/19/2021    CR 0.97 03/18/2021    GLC 77 03/17/2021     (H) 03/16/2021     COAGS:   Lab Results   Component Value Date    INR 0.92 03/15/2021     POC: No results found for: BGM, HCG, HCGS  HEPATIC:   Lab Results   Component Value Date    ALBUMIN 3.2 (L) 03/18/2021    PROTTOTAL 6.6 (L) 03/18/2021    ALT 19 03/18/2021    AST 24 03/18/2021    ALKPHOS 58 03/18/2021    BILITOTAL 0.4 03/18/2021    TAVO <10 (L) 08/17/2020     OTHER:   Lab Results   Component Value Date    LACT 1.6 03/15/2021    KIMBER 8.7 03/17/2021    PHOS 4.4 03/19/2021    MAG 1.6 03/19/2021       Anesthesia Plan    ASA Status:  3   NPO Status:  NPO Appropriate    Anesthesia Type: General.     - Airway: ETT   Induction: Propofol, Intravenous.   Maintenance: Balanced.   Techniques and Equipment:     - Airway: Video-Laryngoscope         Consents    Anesthesia Plan(s) and associated risks, benefits, and realistic alternatives discussed. Questions answered and patient/representative(s) expressed understanding.    - Discussed:     - Discussed with:  Patient          Postoperative Care    Pain management: Multi-modal analgesia.   PONV prophylaxis: Ondansetron (or other 5HT-3), Dexamethasone or Solumedrol, Background Propofol Infusion     Comments:                Maida Garcia MD, MD

## 2023-01-18 NOTE — OP NOTE
Orthopedic Surgery Operative Report    Patient:   Mariela Allen  MRN:   [id number]   :  1950  Facility: [facility]   Date:  23         PREOPERATIVE DIAGNOSIS:    1. Multiple sclerosis  2. Severe central stenosis C3-5.  Possible myelopathic symptoms    POSTOPERATIVE DIAGNOSIS:    1. Multiple sclerosis  2. Severe central stenosis C3-5.  Possible myelopathic symptoms.    PROCEDURE PERFORMED:    1. Anterior cervical discectomy and fusion C3-5  2. Application anterior spinal plate C3-5  3. Placement of interbody cages C3-4, C4-5  4. Use of nonstructural allograft    SURGEON:    Brady Nowak MD    SURGICAL ASSISTANT:    Shari Wilcox PA-C     ANESTHESIA:  General    FINDINGS:    Advanced degeneration of the C3-4 and C4-5 disc.  Severe central stenosis, decompressed the case conclusion.      COMPLICATIONS:     None apparent    SPECIMENS:    None    ESTIMATED BLOOD LOSS:  10 cc    IMPLANTS:    1. NuVasive Cohere interbody cages: 16mm x14 mm footprint, 7 mm height, 7 degree at both levels  2. NuVasive ACP 1.6V 40 mm anterior plate with six 3.5 millimeter screws  3. [employer] DBF nonstructural allograft    INDICATION FOR OPERATION:  The patient is a 72 year old female with multiple sclerosis who has been nonambulatory for approximately 10 years.  Recently she has had progressively worsening bilateral hand function in addition.  Imaging of her cervical spine demonstrates severe central stenosis C3-5.  There is no way to determine definitively whether or not her progressive loss of hand function is from her multiple sclerosis or from her central stenosis, however given that the central stenosis is a problem which could be intervened on with potential to improve or prevent worsening of her symptoms, we discussed operative management.  I discussed with her the risks, benefits, and alternatives of the above operation and she wished to proceed.    DESCRIPTION OF PROCEDURE:    The  patient was met in the preoperative holding area and the operative site was confirmed and marked.  We once again reviewed the risks, benefits, and alternatives of the operation and the patient wished to proceed with the surgery.    The patient was taken back to the operating room and induced under general anesthesia.  The patient was positioned supine with all bony prominences well padded.  Would not manipulate the patient's neck after she was induced under anesthesia from the position she was in initially.  We attempted to establish baseline neuromonitoring signals, however given her multiple sclerosis we were not able to get any reliable measurements.  We therefore abandoned use of neuro monitoring for this case, not manipulate the patient's neck into extension position.  The surgical site was prepped and draped in the usual standard fashion.    I radiographically localized an appropriate site for the incision with a spinal needle laid on the skin.  I then incised the skin transversely in a skin fold on the right side of the neck medial to the sternocleidomastoid and performed a standard Hebert-Pena approach.  I undermined the platysma and then incised it transversely.  I then opened the fascia medial to the sternocleidomastoid and then bluntly dissected down to the anterior spine, taking great care to make sure that the esophagus and trachea were not damaged medially, and the carotid sheath remained lateral to me.  I did not encounter the recurrent laryngeal nerve in the field.  After retracting the esophagus medially, I cleared off the areolar tissue in the midline of the spine, thereby exposing the vertebral bodies and disc space.  I placed a snap on the annulus of the exposed disc, and then radiographically confirmed the correct C4-5 level, with two independent OR personnel also noting they counted this as the correct level.  I then continued my exposure to the vertebral body above and below the operative  discs C3-5.  I elevated the longus coli bilaterally and placed my Trimline retractor below them.    First at the C3-4 level, and subsequently the C4-5 level I performed the following:  I placed Rockledge pins into the vertebral bodies adjacent to the disc and distracted across the disc space.  I performed an annulotomy.  I then removed the disc with a combination of curettes and Kerrisons.  I used a bur along the posterior osteophytes to get down to the posterior longitudinal ligament.  I used a saul also on the endplates to contour them appropriately for my cage.   I took down the posterior longitudinal ligament and visualized dura.  I performed a foraminotomy on both sides to ensure adequate space for the nerve roots.  After the foraminotomies I could easily pass a nerve hook out the foramina without resistance, confirming adequate decompression.    I next trialed for an selected a Nuvasive Cohere interbody cage.  A 14 mm x 16 mm footprint 7 degree cage was most appropriate.  Cage heights of 7 mm were most appropriate.  This cage was selected and packed with [employer] DBF allograft.  The cage was placed and found to have excellent stability with friction fit against the endplates.     I contoured the anterior osteophytes on the vertebral bodies to ensure the my plate would be able to sit flush.  I then selected a 40 mm Nuvasive ACP 1.6V plate and placed it on the anterior aspect of the operative vertebrae.  I used fluoroscopy to confirm appropriate position of this, and then placed screws into the vertebral bodies.  I deployed the locking mechanisms on the plate.    I achieved final hemostasis and thoroughly irrigated the surgical site.  I then began closure.  I closed the platysma with a running 3-0 Vicryl.  Subcutaneous tissues were closed with 4-0 Vicryl loosely, and then a running 4-0 Monocryl.  A sterile dressing and Tegaderm were placed over the incision.      The patient was allowed to emerge from anesthesia  which occurred without incident and was transported to PACU in stable condition.        A surgical assistant was critical for this case to assist in retraction of the soft tissues and evacuating blood from the surgical field to facilitate a safer operation with improved visualization and less time under anesthesia.     All sponge and needle counts were correct at case conclusion.      POSTOPERATIVE PLAN:  -Activity:    Up with assist  -Weight Bearing Status:  WBAT   -Bracing:   Soft collar.  May briefly remove for eating, and hygiene if desired.  -Antibiotics:     Ancef x24h  -Anticoagulation:   SCDs  -Steroids:   Decadron 4 mg IV q6h x24h  -Pain control:    IV and PO, wean to PO as able  -Dressing:    Ok to shower with dressing in place, dressing ok to get wet.  -Diet:     ADAT  -Labs:     AM Hgb, BMP    -Disposition:    Pending medical stability, PT, anticipate discharge around POD 1-2  -Follow up:     2 weeks in my clinic        Brady Nowak MD

## 2023-01-18 NOTE — OR NURSING
Report called to Serjio GAUTHIER  on the 4th floor. Pt transported per tech Kailey with all belongings, and wheelchair to [id number]

## 2023-01-18 NOTE — ANESTHESIA PROCEDURE NOTES
Airway       Patient location during procedure: OR       Procedure Start/Stop Times: 1/18/2023 10:50 AM  Staff -        Anesthesiologist:  Maida Garcia MD       CRNA: Renae Bran APRN CRNA       Other Anesthesia Staff: Linda Celeste       Performed By: SRNA  Consent for Airway        Urgency: elective  Indications and Patient Condition       Indications for airway management: nilsa-procedural       Induction type:intravenous       Mask difficulty assessment: 1 - vent by mask    Final Airway Details       Final airway type: endotracheal airway       Successful airway: ETT - single  Endotracheal Airway Details        ETT size (mm): 7.0       Cuffed: yes       Cuff volume (mL): 10       Successful intubation technique: video laryngoscopy       VL Blade Size: Jacinto 3       Grade View of Cords: 1       Adjucts: stylet       Position: Right       Measured from: gums/teeth       Secured at (cm): 22       Bite block used: Soft    Post intubation assessment        Placement verified by: capnometry and equal breath sounds        Number of attempts at approach: 1       Secured with: commercial tube gutierrez and pink tape       Ease of procedure: easy       Dentition: Intact and Unchanged    Medication(s) Administered   Medication Administration Time: 1/18/2023 10:50 AM

## 2023-01-19 ENCOUNTER — APPOINTMENT (OUTPATIENT)
Dept: PHYSICAL THERAPY | Facility: CLINIC | Age: 73
End: 2023-01-19
Attending: ORTHOPAEDIC SURGERY
Payer: COMMERCIAL

## 2023-01-19 VITALS
DIASTOLIC BLOOD PRESSURE: 71 MMHG | WEIGHT: 165.1 LBS | RESPIRATION RATE: 18 BRPM | TEMPERATURE: 98 F | OXYGEN SATURATION: 94 % | SYSTOLIC BLOOD PRESSURE: 141 MMHG | HEIGHT: 65 IN | BODY MASS INDEX: 27.51 KG/M2 | HEART RATE: 79 BPM

## 2023-01-19 LAB
ALBUMIN SERPL BCG-MCNC: 3.5 G/DL (ref 3.5–5.2)
ALP SERPL-CCNC: 73 U/L (ref 35–104)
ALT SERPL W P-5'-P-CCNC: 18 U/L (ref 10–35)
ANION GAP SERPL CALCULATED.3IONS-SCNC: 9 MMOL/L (ref 7–15)
AST SERPL W P-5'-P-CCNC: 33 U/L (ref 10–35)
BASOPHILS # BLD AUTO: 0 10E3/UL (ref 0–0.2)
BASOPHILS NFR BLD AUTO: 0 %
BILIRUB DIRECT SERPL-MCNC: <0.2 MG/DL (ref 0–0.3)
BILIRUB SERPL-MCNC: 0.4 MG/DL
BUN SERPL-MCNC: 8.1 MG/DL (ref 8–23)
CALCIUM SERPL-MCNC: 9.3 MG/DL (ref 8.8–10.2)
CHLORIDE SERPL-SCNC: 105 MMOL/L (ref 98–107)
CREAT SERPL-MCNC: 0.61 MG/DL (ref 0.51–0.95)
DEPRECATED HCO3 PLAS-SCNC: 29 MMOL/L (ref 22–29)
EOSINOPHIL # BLD AUTO: 0 10E3/UL (ref 0–0.7)
EOSINOPHIL NFR BLD AUTO: 0 %
ERYTHROCYTE [DISTWIDTH] IN BLOOD BY AUTOMATED COUNT: 13.2 % (ref 10–15)
GFR SERPL CREATININE-BSD FRML MDRD: >90 ML/MIN/1.73M2
GLUCOSE BLDC GLUCOMTR-MCNC: 140 MG/DL (ref 70–99)
GLUCOSE SERPL-MCNC: 147 MG/DL (ref 70–99)
HCT VFR BLD AUTO: 37.3 % (ref 35–47)
HGB BLD-MCNC: 12.4 G/DL (ref 11.7–15.7)
IMM GRANULOCYTES # BLD: 0 10E3/UL
IMM GRANULOCYTES NFR BLD: 0 %
LYMPHOCYTES # BLD AUTO: 0.6 10E3/UL (ref 0.8–5.3)
LYMPHOCYTES NFR BLD AUTO: 8 %
MCH RBC QN AUTO: 31.3 PG (ref 26.5–33)
MCHC RBC AUTO-ENTMCNC: 33.2 G/DL (ref 31.5–36.5)
MCV RBC AUTO: 94 FL (ref 78–100)
MONOCYTES # BLD AUTO: 0.1 10E3/UL (ref 0–1.3)
MONOCYTES NFR BLD AUTO: 2 %
NEUTROPHILS # BLD AUTO: 6.4 10E3/UL (ref 1.6–8.3)
NEUTROPHILS NFR BLD AUTO: 90 %
NRBC # BLD AUTO: 0 10E3/UL
NRBC BLD AUTO-RTO: 0 /100
PLATELET # BLD AUTO: 268 10E3/UL (ref 150–450)
POTASSIUM SERPL-SCNC: 4.3 MMOL/L (ref 3.4–5.3)
PROT SERPL-MCNC: 6.1 G/DL (ref 6.4–8.3)
RBC # BLD AUTO: 3.96 10E6/UL (ref 3.8–5.2)
SODIUM SERPL-SCNC: 143 MMOL/L (ref 136–145)
WBC # BLD AUTO: 7.1 10E3/UL (ref 4–11)

## 2023-01-19 PROCEDURE — 82962 GLUCOSE BLOOD TEST: CPT

## 2023-01-19 PROCEDURE — 97161 PT EVAL LOW COMPLEX 20 MIN: CPT | Mod: GP

## 2023-01-19 PROCEDURE — 250N000011 HC RX IP 250 OP 636: Performed by: STUDENT IN AN ORGANIZED HEALTH CARE EDUCATION/TRAINING PROGRAM

## 2023-01-19 PROCEDURE — 99207 PR NO CHARGE LOS: CPT | Performed by: PHYSICIAN ASSISTANT

## 2023-01-19 PROCEDURE — 258N000003 HC RX IP 258 OP 636: Performed by: STUDENT IN AN ORGANIZED HEALTH CARE EDUCATION/TRAINING PROGRAM

## 2023-01-19 PROCEDURE — 999N000157 HC STATISTIC RCP TIME EA 10 MIN

## 2023-01-19 PROCEDURE — 80053 COMPREHEN METABOLIC PANEL: CPT | Performed by: HOSPITALIST

## 2023-01-19 PROCEDURE — 250N000013 HC RX MED GY IP 250 OP 250 PS 637: Performed by: STUDENT IN AN ORGANIZED HEALTH CARE EDUCATION/TRAINING PROGRAM

## 2023-01-19 PROCEDURE — 85025 COMPLETE CBC W/AUTO DIFF WBC: CPT | Performed by: STUDENT IN AN ORGANIZED HEALTH CARE EDUCATION/TRAINING PROGRAM

## 2023-01-19 PROCEDURE — 97530 THERAPEUTIC ACTIVITIES: CPT | Mod: GP

## 2023-01-19 PROCEDURE — 36415 COLL VENOUS BLD VENIPUNCTURE: CPT | Performed by: STUDENT IN AN ORGANIZED HEALTH CARE EDUCATION/TRAINING PROGRAM

## 2023-01-19 PROCEDURE — 82248 BILIRUBIN DIRECT: CPT | Performed by: HOSPITALIST

## 2023-01-19 RX ORDER — AMOXICILLIN 250 MG
1 CAPSULE ORAL 2 TIMES DAILY
Qty: 28 TABLET | Refills: 0 | Status: SHIPPED | OUTPATIENT
Start: 2023-01-19 | End: 2023-02-02

## 2023-01-19 RX ORDER — OXYCODONE HYDROCHLORIDE 5 MG/1
5 TABLET ORAL EVERY 4 HOURS PRN
Qty: 20 TABLET | Refills: 0 | Status: ON HOLD | OUTPATIENT
Start: 2023-01-19 | End: 2024-06-27

## 2023-01-19 RX ADMIN — HYDROXYZINE HYDROCHLORIDE 10 MG: 10 TABLET ORAL at 05:40

## 2023-01-19 RX ADMIN — BACLOFEN 30 MG: 10 TABLET ORAL at 08:08

## 2023-01-19 RX ADMIN — CEFAZOLIN 1 G: 1 INJECTION, POWDER, FOR SOLUTION INTRAMUSCULAR; INTRAVENOUS at 02:10

## 2023-01-19 RX ADMIN — SODIUM CHLORIDE, POTASSIUM CHLORIDE, SODIUM LACTATE AND CALCIUM CHLORIDE: 600; 310; 30; 20 INJECTION, SOLUTION INTRAVENOUS at 02:07

## 2023-01-19 RX ADMIN — OXYCODONE HYDROCHLORIDE 10 MG: 5 TABLET ORAL at 00:20

## 2023-01-19 RX ADMIN — METHENAMINE HIPPURATE 1 G: 1 TABLET ORAL at 08:08

## 2023-01-19 RX ADMIN — Medication 4000 UNITS: at 08:15

## 2023-01-19 RX ADMIN — SENNOSIDES AND DOCUSATE SODIUM 1 TABLET: 50; 8.6 TABLET ORAL at 08:08

## 2023-01-19 RX ADMIN — OXYCODONE HYDROCHLORIDE 5 MG: 5 TABLET ORAL at 05:40

## 2023-01-19 RX ADMIN — POLYETHYLENE GLYCOL 3350 17 G: 17 POWDER, FOR SOLUTION ORAL at 08:09

## 2023-01-19 RX ADMIN — DEXAMETHASONE SODIUM PHOSPHATE 4 MG: 4 INJECTION, SOLUTION INTRAMUSCULAR; INTRAVENOUS at 05:40

## 2023-01-19 RX ADMIN — BACLOFEN 30 MG: 10 TABLET ORAL at 15:57

## 2023-01-19 RX ADMIN — ACETAMINOPHEN 975 MG: 325 TABLET, FILM COATED ORAL at 00:20

## 2023-01-19 RX ADMIN — ANASTROZOLE 1 MG: 1 TABLET, COATED ORAL at 08:08

## 2023-01-19 RX ADMIN — ACETAMINOPHEN 975 MG: 325 TABLET, FILM COATED ORAL at 08:08

## 2023-01-19 RX ADMIN — OXYCODONE HYDROCHLORIDE 10 MG: 5 TABLET ORAL at 15:57

## 2023-01-19 ASSESSMENT — ACTIVITIES OF DAILY LIVING (ADL)
ADLS_ACUITY_SCORE: 42

## 2023-01-19 NOTE — PROGRESS NOTES
Pt stable for discharge. Reviewed AVS packet with pt. Peripheral IVs removed. Pt to receive transportation to home via wheelchair taxi-cab.

## 2023-01-19 NOTE — PLAN OF CARE
Assist of 2 with lift - wheelchair bound. Tolerating full liquid diet and PO medications. Guadarrama catheter in place. Pain managed with scheduled tylenol and baclofen and PRN oxycodone. O2 weaned to 2L sating at 95%.

## 2023-01-19 NOTE — PROGRESS NOTES
Guadarrama out at 06:15 this morning, report given to day nurse to monitor void. Patient asserted that unfortunately she is usually incontinent of bladder.

## 2023-01-19 NOTE — PROGRESS NOTES
"Ortho Progress Note     Subjective:  No acute overnight events. Pain controlled. Some improvement in BUE hand function compared to preop, she is happy about this.    Objective:  BP (!) 141/71 (BP Location: Right arm, Patient Position: Semi-Samuel's, Cuff Size: Adult Regular)   Pulse 79   Temp 98  F (36.7  C) (Oral)   Resp 18   Ht 1.651 m (5' 5\")   Wt 74.9 kg (165 lb 1.6 oz)   LMP  (LMP Unknown)   SpO2 97%   BMI 27.47 kg/m    Gen: A&Ox3, no acute distress  CV: 2+ dp/pt pulses, capillary refill < 2sec  Resp: breathing equal and non-labored, no wheezing  MSK:       Spine:   Skin: intact about neck without evidence of infection    Sensation:      R       L    C5:   Intact   Intact    C6:     Intact   Intact    C7:   Intact   Intact    C8:   Intact   Intact     Motor:     R L    Baseline decreased use of both hands and UE related to MS diagnosis, patient notes some improvement in function L > R    Hemoglobin   Date Value Ref Range Status   01/19/2023 12.4 11.7 - 15.7 g/dL Final   03/16/2021 11.9 11.7 - 15.7 g/dL Final   03/15/2021 13.6 11.7 - 15.7 g/dL Final         Assessment/Plan:  72F with MS POD 1 s/p ACDF C3-5. Recovering as expected.    -Activity:                                       Up with assist  -Weight Bearing Status:            WBAT   -Bracing:                                      Soft collar.  May briefly remove for eating, and hygiene if desired.  -Antibiotics:                                  Ancef x24h  -Anticoagulation:                        SCDs  -Steroids:                                      Decadron 4 mg IV q6h x24h  -Pain control:                               IV and PO, wean to PO as able  -Dressing:                                     Ok to shower with dressing in place, dressing ok to get wet.  -Diet:                                              ADAT     -Disposition:                                 Home today  -Follow up:                                   2 weeks in my clinic    Brady " NEGRITA Nowak MD  Orthopedic Spine Surgery  [facility]

## 2023-01-19 NOTE — PLAN OF CARE
Physical Therapy Discharge Summary    Reason for therapy discharge:    All goals and outcomes met, no further needs identified.    Progress towards therapy goal(s). See goals on Care Plan in [facility] electronic health record for goal details.  Goals met    Therapy recommendation(s):    No further therapy is recommended.

## 2023-01-19 NOTE — PROGRESS NOTES
Hospitalist Chart Check:    Mariela Allen is a 72 year old female with PMHx of MS with neurogenic bladder with recurrent UTIs and spasticity, severe cervical central stenosis admitted on 1/18/2023 after undergoing an anterior cervical discectomy and fusion of C3-5. Hospitalist service was ASAP consulted for elevated blood pressures post-op.      S/P anterior cervical discectomy and fusion C3-5 for severe central stenosis: Surgery per Dr. Nowak, general anesthesia used. EBL 10 ccs.   -- Defer routine post-operative cares, IVF, DVT prophylaxis and pain control to primary service   -- Encourage pulmonary toilet; incentive spirometer at bedside   -- Bowel regimen in place while on narcotics   -- Decadron 4 mg IV q6 hrs X24h   -- PT and OT   -- CBC and BMP morning of 1/19 unremarkable     Elevated blood pressures post-op  Benign essential hypertension, not currently on meds: Pre-OP /64. Previously on Amlodipine. SBPs in the 170s post-op. This was in the context of uncontrolled pain.   - BPs have since normalized with appropriate pain management. No indication for antihypertensives. /74--141/71.  -- Recommend routine follow-up with PCP after she recovers from the surgery to see if her previous hypertension has started to worsen.      Multiple sclerosis with tetraplegia, spasticity and neurogenic bladder  RLS: Follows with Dr. Springer of Neurology.  Baseline weakness in all extremities, RUE weaker than LUE.   - Continue PTA Baclofen and Mirapex   - Remove mtz per post-op orders. Pt does not have chronic mtz or utilize intermittent straight cath. She is incontinent in her brief. She would benefit from purewick use while hospitalized   - Utilizes electric wheelchair   - Lives at home with . Hires out PCA cares (but has current gap in care), lift for transfers     Invasive ductal breast cancer, left, s/p lumpectomy and radiation (2018)  - Continue PTA Anastrozole (plan for 5 years or tx)      UTI  prophylaxis: In the context of neurogenic bladder above. Maintained on Hiprex 1 g BID which can be resumed after completion of periop Ancef that is currently ordered      Anxiety: Continue with PRN valium      BRITTANY: CPAP with home settings ordered     *Hospitalist service will continue to chart check while patient hospitalized.  *Discharge med reconciliation completed.  Defer discharge to primary spine surgery service, okay from medicine standpoint.    Brian Snyder PA-C  [facility] hospitalist service

## 2023-01-19 NOTE — PROGRESS NOTES
01/19/23 0915   Appointment Info   Signing Clinician's Name / Credentials (PT) Eva Birmingham DPT   Quick Adds   Quick Adds Certification   Living Environment   People in Home spouse   Current Living Arrangements condominium   Home Accessibility no concerns;wheelchair accessible   Transportation Anticipated family or friend will provide   Living Environment Comments Pt lives in a condo w/ her . Pt has PCA care 2 hours in the morning and 2 hours at night. Pt  can also provide some assist   Self-Care   Usual Activity Tolerance fair   Current Activity Tolerance fair   Equipment Currently Used at Home wheelchair, power;commode chair;dressing device   Fall history within last six months no   Activity/Exercise/Self-Care Comment Pt  bound at baseline. Pt transfers in lift w/ Ax1. Pt receives assist w/ dressing, bathing, toileting. Pt can navigate power    General Information   Onset of Illness/Injury or Date of Surgery 01/18/23   Referring Physician Shari Wilcox, PA-C   Patient/Family Therapy Goals Statement (PT) Return home   Pertinent History of Current Problem (include personal factors and/or comorbidities that impact the POC) 72F with MS POD 1 s/p ACDF C3-5   Existing Precautions/Restrictions spinal   General Observations Pt supine in bed upon therapist arrival, agreeable to PT   Cognition   Affect/Mental Status (Cognition) WFL   Orientation Status (Cognition) oriented x 4   Follows Commands (Cognition) WFL   Pain Assessment   Patient Currently in Pain   (2/10 neck, pt reports chronic pain in LEs)   Integumentary/Edema   Integumentary/Edema Comments Incision on ant neck.   Range of Motion (ROM)   Range of Motion ROM deficits secondary to weakness   Strength (Manual Muscle Testing)   Strength (Manual Muscle Testing) Deficits observed during functional mobility   Bed Mobility   Comment, (Bed Mobility) Pt rolled w/ Max A   Transfers   Comment, (Transfers) Pt transferred to  w/ overhead lift    Gait/Stairs (Locomotion)   Comment, (Gait/Stairs) Pt WC bound at baseline   Balance   Balance Comments Poor seated balance, poor trunk control at baseline   Sensory Examination   Sensory Perception patient reports no sensory changes   Sensory Perception Comments Pt reports impaired sensation at baseline   Muscle Tone   Muscle Tone Comments Increased tone in B ankles   Clinical Impression   Criteria for Skilled Therapeutic Intervention Yes, treatment indicated   PT Diagnosis (PT) Impaired functional mobility   Influenced by the following impairments Pain, weakness, decreased activity tolerance, impaired balance   Functional limitations due to impairments Limited functional mobility requiring AD and assist   Clinical Presentation (PT Evaluation Complexity) Stable/Uncomplicated   Clinical Presentation Rationale Based on PMH, current status, and social support   Clinical Decision Making (Complexity) low complexity   Planned Therapy Interventions (PT) balance training;bed mobility training;ROM (range of motion);strengthening;transfer training;wheelchair management/propulsion training;patient/family education   Risk & Benefits of therapy have been explained evaluation/treatment results reviewed;care plan/treatment goals reviewed;risks/benefits reviewed;current/potential barriers reviewed;participants voiced agreement with care plan;participants included;patient   PT Total Evaluation Time   PT Eval, Low Complexity Minutes (69874) 10   Plan of Care Review   Plan of Care Reviewed With patient   Therapy Certification   Start of care date 01/19/23   Certification date from 01/19/23   Certification date to 01/26/23   Medical Diagnosis ACDF C3-C5   Physical Therapy Goals   PT Frequency One time eval and treatment only   PT Predicted Duration/Target Date for Goal Attainment 01/19/23   PT Goals Bed Mobility;Wheelchair Mobility   PT: Bed Mobility Maximum assist;Rolling;Goal Met   PT: Wheelchair Mobility 10 feet;power  wheelchair;Caregiver SBA;Goal Met   Interventions   Interventions Quick Adds Therapeutic Activity   Therapeutic Activity   Therapeutic Activities: dynamic activities to improve functional performance Minutes (79769) 40   Symptoms Noted During/After Treatment Fatigue;Increased pain   Treatment Detail/Skilled Intervention Donned soft collar total assist. Therapist educated pt on cervical spine precautions. Pt demos R lean in bed, pt repositioned to neutral position. Pt rolled side to side to place sling w/ Max A, pt able to hold sidelying w/ Mod A, aide placing sling. Pt transferred to power WC w/ overhead sling. Therapist providing Min support for pt's head. Pt sat in WC, pt able to navigate controls and manage WC IND. Pt transferred back to bed w/ overhead lift. Pt rolled side to side w/ Max A to remove sling. Pt reports she performs UE exercises at home, therapist encouraged pt to perform w/out weights to follow spinal precautions and to perform in pain free range. Therapist educated pt on safe mobility. Discussion on home set up and level of assist. Pt in bed at end of session, compression pumpers on, all needs w/in reach, pillow support, bed alarm on, RN updated.   PT Discharge Planning   PT Plan D/C   PT Discharge Recommendation (DC Rec) home with assist   PT Rationale for DC Rec Pt is at baseline, WC bound w/ assist from  and PCA. Pt Max A for bed mob, and lift for transfers. Pt able to navigate in power WC   PT Brief overview of current status Rolling Max A, transfers w/ lift   Total Session Time   Timed Code Treatment Minutes 40   Total Session Time (sum of timed and untimed services) 50   [facility]  OUTPATIENT PHYSICAL THERAPY EVALUATION  PLAN OF TREATMENT FOR OUTPATIENT REHABILITATION  (COMPLETE FOR INITIAL CLAIMS ONLY)  Patient's Last Name, First Name, M.I.  YOB: 1950  Mariela Allen                           Provider's Name  [facility] Medical Record No.  [id number]                             Onset Date:  01/18/23   Start of Care Date:  01/19/23   Type:     _X_PT   ___OT   ___SLP Medical Diagnosis:  ACDF C3-C5              PT Diagnosis:  Impaired functional mobility Visits from SOC:  1     See note for plan of treatment, functional goals and certification details    I CERTIFY THE NEED FOR THESE SERVICES FURNISHED UNDER        THIS PLAN OF TREATMENT AND WHILE UNDER MY CARE     (Physician co-signature of this document indicates review and certification of the therapy plan).

## 2023-01-19 NOTE — CONSULTS
[facility]  Consult Note - Hospitalist Service  Date of Admission:  1/18/2023  Consult Requested by: Shari Wilcox PA-C   Reason for Consult: Elevated BP postop     Assessment & Plan   Mariela Allen is a 72 year old female with PMHx of MS with neurogenic bladder with recurrent UTIs and spasticity, severe cervical central stenosis admitted on 1/18/2023 after undergoing an anterior cervical discectomy and fusion of C3-5. Hospitalist service was ASAP consulted for elevated blood pressures post-op.     S/P anterior cervical discectomy and fusion C3-5 for severe central stenosis: Surgery per Dr. Nowak, general anesthesia used. EBL 10 ccs.   -- Defer routine post-operative cares, IVF, DVT prophylaxis and pain control to primary service   -- Encourage pulmonary toilet; incentive spirometer at bedside   -- Bowel regimen in place while on narcotics   -- Decadron 4 mg IV q6 hrs X24h   -- PT and OT in the AM   -- CBC and BMP in AM     Elevated blood pressures post-op  Benign essential hypertension, not currently on meds: Pre-OP /64. Previously on Amlodipine. SBPs in the 170s post-op. This was in the context of uncontrolled pain.   - BPs have since normalized with appropriate pain management. No indication for antihypertensives. Most recent /74.     Multiple sclerosis with tetraplegia, spasticity and neurogenic bladder  RLS: Follows with Dr. Springer of Neurology.  Baseline weakness in all extremities, RUE weaker than LUE.   - Continue PTA Baclofen and Mirapex   - Remove mtz per post-op orders. Pt does not have chronic mtz or utilize intermittent straight cath. She is incontinent in her brief. She would benefit from purewick use while hospitalized   - Utilizes electric wheelchair   - Lives at home with . Hires out PCA cares (but has current gap in care), lift for transfers    Invasive ductal breast cancer, left, s/p lumpectomy and radiation (2018)  - Continue PTA  "Anastrozole (plan for 5 years or tx)     UTI prophylaxis: In the context of neurogenic bladder above. Maintained on Hiprex 1 g BID which can be resumed after completion of periop Ancef that is currently ordered     Anxiety: Continue with PRN valium     BRITTANY: CPAP with home settings ordered      The patient's care was discussed with the Attending Physician, Dr. Harris, Bedside Nurse and Patient.    Hospitalist service will chart check patient in the AM.    Clinically Significant Risk Factors Present on Admission                       # Overweight: Estimated body mass index is 27.47 kg/m  as calculated from the following:    Height as of this encounter: 1.651 m (5' 5\").    Weight as of this encounter: 74.9 kg (165 lb 1.6 oz).           Desi Dorman PA-C  Hospitalist Service  Securely message with [employer] (more info)  Text page via [facility] Paging/Directory   ______________________________________________________________________    Chief Complaint   Cervical radiculopathy    History is obtained from the patient and chart review     History of Present Illness   Mariela Allen is a 72 year old female with PMHx of MS with neurogenic bladder with recurrent UTIs and spasticity, severe cervical central stenosis admitted on 1/18/2023 after undergoing an anterior cervical discectomy and fusion of C3-5. Hospitalist service was ASAP consulted for elevated blood pressures post-op.     Surgery per Dr. Nowak, general anesthesia used. EBL 10 ccs. Reviewed pre-op H&P. Pre-OP /64. Previously on Amlodipine. SBPs in the 170s post-op. This was in the context of uncontrolled pain. BPs have since normalized with appropriate pain management. No indication for antihypertensives. Most recent /74.     Pt denies CP, SOB, dizziness, lightheadedness, nausea, vomiting. No acute concerns. Mtz in place, note hx of neurogenic bladder, but does not have a chronic mtz or utilize intermittent straight caths at home. "     Past Medical History    Past Medical History:   Diagnosis Date     Breast cancer (H)     Invasive ductal carcinoma of Left brst     Cancer (H)     Basel cell Skin cancer      Chronic pain      Clostridium difficile colitis 2011     Hyperlipidemia      Hypertension      MS (multiple sclerosis) (H)      Multiple sclerosis (H)      Nerve pain      Neurogenic bladder      BRITTANY (obstructive sleep apnea)      Recurrent UTI        Past Surgical History   Past Surgical History:   Procedure Laterality Date     BIOPSY NODE SENTINEL Left 08/15/2018    Procedure: BIOPSY NODE SENTINEL;;  Surgeon: Mariel Boucher MD;  Location: [facility]     BLADDER SURGERY       LUMPECTOMY BREAST WITH SEED LOCALIZATION Left 08/15/2018    Procedure: LUMPECTOMY BREAST WITH SEED LOCALIZATION;  LEFT SEED LOCALIZED BREAST LUMPECTOMY WITH SENTINEL NODE BIOPSY ;  Surgeon: Mariel Boucher MD;  Location: [facility]     ORTHOPEDIC SURGERY      ankle       Medications   Medications Prior to Admission   Medication Sig Dispense Refill Last Dose     anastrozole (ARIMIDEX) 1 MG tablet Take 1 mg by mouth daily   1/17/2023 at PM     baclofen (LIORESAL) 10 MG tablet Take 30 mg by mouth 4 times daily (3 x 10 mg = 30 mg)    at AM     Capsaicin (SALONPAS-HOT) 0.025 % PTCH Externally apply 1 patch topically daily   1/17/2023 at PM     Cholecalciferol 100 MCG (4000 UT) CAPS Take 4,000 Units by mouth 2 times daily    at AM     cyanocobalamin (VITAMIN B-12) 1000 MCG tablet Take 1,000 mcg by mouth daily   1/17/2023 at PM     diazepam (VALIUM) 5 MG tablet Take 5 mg by mouth every 6 hours as needed for anxiety   1/15/2023 at PRN     methenamine hippurate (HIPREX) 1 g tablet Take 1 g by mouth 2 times daily    at PM     Multiple Vitamins-Minerals (CERTAVITE SENIOR/ANTIOXIDANT) TABS Take 1 tablet by mouth every morning   1/7/2023 at AM     pramipexole (MIRAPEX) 0.25 MG tablet Take 0.25 mg by mouth At Bedtime    at HS     traMADol (ULTRAM) 50 MG tablet Take 1 tablet (50 mg) by  mouth 2 times daily as needed for moderate pain    at AM          Review of Systems    The 10 point Review of Systems is negative other than noted in the HPI.    Allergies   Allergies   Allergen Reactions     Solu-Medrol [Methylprednisolone]      High Dose caused Delirium        Physical Exam   Vital Signs: Temp: 97.7  F (36.5  C) Temp src: Oral BP: 132/74 Pulse: 92   Resp: 18 SpO2: 93 % O2 Device: Nasal cannula Oxygen Delivery: 2 LPM  Weight: 165 lbs 1.6 oz    CONSTITUTIONAL: Pt laying in bed, dressed in hospital garb. Appears comfortable. Cooperative with interview.   HEENT: Normocephalic, atraumatic. C collar in place.   CARDIOVASCULAR: RRR, no murmurs, rubs, or extra heart sounds appreciated. Pulses +2/4 and regular in upper and lower extremities, bilaterally.   RESPIRATORY: No increased work of breathing.  Supplemental oxygenation via NC at 2 LPM. CTA, bilat; no wheezes, rales, or rhonchi appreciated.  GASTROINTESTINAL:  Abdomen soft, non-distended. BS auscultated in all four quadrants. Negative for tenderness to palpation.  No masses or organomegaly noted.  MUSCULOSKELETAL: Baseline tetraplegia with RUE >LUE weakness and bilateral LE weakness.   HEMATOLOGIC/LYMPHATIC/IMMUNOLOGIC: No anterior or posterior cervical LAD, bilaterally. Negative for lower extremity edema, bilaterally.  NEUROLOGIC: Alert and oriented to person, place, and time.  strength intact. No focal neuro deficits.   SKIN: Warm, dry, intact.   : Guadarrama with straw colored urine.     Medical Decision Making       45 MINUTES SPENT BY ME on the date of service doing chart review, history, exam, documentation & further activities per the note.      Data     I have personally reviewed the following data over the past 24 hrs:    Procal: N/A CRP: N/A Lactic Acid: 1.0         Imaging results reviewed over the past 24 hrs:   Recent Results (from the past 24 hour(s))   XR Surgery TAINA L/T 5 Min Fluoro w Stills    Narrative    SURGERY C-ARM FLUORO LESS  THAN 5 MINUTES WITH STILLS  1/18/2023 1:10  PM     COMPARISON: None.    HISTORY: C3-5 ACDF.     NUMBER OF IMAGES ACQUIRED: 2    VIEWS: Frontal and lateral views of the cervical spine.    FLUOROSCOPY TIME: .7 minutes.      Impression    IMPRESSION: ACDF changes C3-C5. No intraoperative consultation of the  interpreting radiologist was requested for this study. Partially  visualized endotracheal tube or enteric tube projecting over the oral  cavity/pharynx and upper esophageal region, incompletely evaluated.  Presumed left carotid atherosclerotic calcifications. Please see  operative report for additional details and real-time findings.    GUICHO TEJADA MD         SYSTEM ID:  [id number]

## 2023-01-19 NOTE — PROGRESS NOTES
Today is POD #1 of anterior cervical discectomy and fusion, small amount of serosanguinous drainage on dressing. Patient reports that numbness in bilateral hands and feet is baseline, no changes in sensation since procedure. Both hands contracted due to MS, limited ROM in lower extremities. Repositioning done by staff. Verbal order given by provider to remove Guadarrama but patient requested to keep it in overnight, will be removed at 0600. Pain managed with 10 mg oxycodone, good relief. CPAP applied overnight and patient seeping comfortably. Will keep monitoring.

## 2023-01-20 LAB
ATRIAL RATE - MUSE: 72 BPM
DIASTOLIC BLOOD PRESSURE - MUSE: NORMAL MMHG
INTERPRETATION ECG - MUSE: NORMAL
P AXIS - MUSE: 41 DEGREES
PR INTERVAL - MUSE: 156 MS
QRS DURATION - MUSE: 84 MS
QT - MUSE: 424 MS
QTC - MUSE: 464 MS
R AXIS - MUSE: -10 DEGREES
SYSTOLIC BLOOD PRESSURE - MUSE: NORMAL MMHG
T AXIS - MUSE: 27 DEGREES
VENTRICULAR RATE- MUSE: 72 BPM

## 2023-01-20 NOTE — DISCHARGE SUMMARY
ORTHOPAEDIC DISCHARGE SUMMARY     Date of Admission: 1/18/2023  Date of Discharge: 1/19/2023  4:53 PM  Disposition: Home  Surgeon: Brady Nowak MD      DISCHARGE DIAGNOSIS:  Spinal stenosis in cervical region [M48.02]  Cervical disc disorder with myelopathy of gaqjykeo-tfvgfcc-sldrp region [M50.01]  Displacement of intervertebral disc of mid-cervical region [M50.220]    PROCEDURES: Procedure(s):  cervical 3 to cervical 5 anterior cervical discectomy and fusion on 1/18/2023    BRIEF HISTORY:  This was a planned admission after the above elective procedure.    HOSPITAL COURSE:    Surgery was uncomplicated. Mariela Allen has done well post-operatively. Medicine was consulted post operatively to aid in management of medical comorbidities. See final recommendations below.     The patient received routine nursing cares and is medically stable. Vital signs are stable. The patient is tolerating a regular diet without GI distress/nausea or vomiting. Voiding spontaneously. All PT/OT goals have been met for safe mobility. Pain is now controlled on oral medications which will be available on discharge. Stool softeners have been used while taking pain medications to help prevent constipation. Mariela Allen is deemed medically safe to discharge.     Antibiotics:  Given periop and 24 hours postop.  PT Progress:  Has met PT/OT goals for safe mobility.   Pain Meds:  Weaned off all IV pain meds by discharge.  Inpatient Events:  No significant events or complications.     Discharge orders and instructions as below.    FOLLOWUP:    Future Appointments   Date Time Provider Department Center   2/16/2023  1:30 PM [id number]     Per Medicine Team, patient is to follow up with PCP in the outpatient setting for monitoring of blood pressures     Follow up in Dr. Nowak's clinic in 2 weeks     PLANNED DISCHARGE ORDERS:     DVT Prophylaxis: Mobilization        Discharge Medication List as of 1/19/2023 12:04 PM      START taking  these medications    Details   oxyCODONE (ROXICODONE) 5 MG tablet Take 1 tablet (5 mg) by mouth every 4 hours as needed for moderate pain (4-6), Disp-20 tablet, R-0, E-Prescribe      senna-docusate (SENOKOT-S/PERICOLACE) 8.6-50 MG tablet Take 1 tablet by mouth 2 times daily for 14 days, Disp-28 tablet, R-0, E-PrescribeOnce bowel movements become regular, you may discontinue the medication         CONTINUE these medications which have NOT CHANGED    Details   anastrozole (ARIMIDEX) 1 MG tablet Take 1 mg by mouth daily, Historical      baclofen (LIORESAL) 10 MG tablet Take 30 mg by mouth 4 times daily (3 x 10 mg = 30 mg), Historical      Capsaicin (SALONPAS-HOT) 0.025 % PTCH Externally apply 1 patch topically daily, Historical      Cholecalciferol 100 MCG (4000 UT) CAPS Take 4,000 Units by mouth 2 times daily, Historical      cyanocobalamin (VITAMIN B-12) 1000 MCG tablet Take 1,000 mcg by mouth daily, Historical      diazepam (VALIUM) 5 MG tablet Take 5 mg by mouth every 6 hours as needed for anxiety, Historical      methenamine hippurate (HIPREX) 1 g tablet Take 1 g by mouth 2 times daily, Historical      Multiple Vitamins-Minerals (CERTAVITE SENIOR/ANTIOXIDANT) TABS Take 1 tablet by mouth every morning, Historical      pramipexole (MIRAPEX) 0.25 MG tablet Take 0.25 mg by mouth At Bedtime, Historical      traMADol (ULTRAM) 50 MG tablet Take 1 tablet (50 mg) by mouth 2 times daily as needed for moderate pain, Historical               Discharge Procedure Orders   Discharge Instructions   Order Comments: Review outpatient procedure discharge instructions as directed by Provider.     Discharge Instructions - Neck brace   Order Comments: Wear neck brace when out of bed.     Discharge Instructions - Contact surgery team   Order Comments: Contact surgical team with any new swelling or tightness to the throat/incision site if it is causing discomfort.     Call 911 if it becomes difficult to breath as this is a medical  emergency.     Discharge Instructions - Shower with incision NOT covered   Order Comments: You may shower the day after surgery.  You do not need to cover your surgical incision in the shower and may allow water and soap to run over top of the dressing (it is waterproof) or incision. Do not soak or submerge the incision underwater until cleared to do so, usually 2 or 3 weeks after surgery.     Discharge Instructions - No tub bathing   Order Comments: Tub bathing, swimming, or any other activities that will cause your incision to be submerged in water should be avoided until allowed by your Provider.     Discharge Instructions - Change dressing   Order Comments: Wash hands prior to changing dressing daily. If no drainage on dressing, may leave open to air.     Discharge Instructions - Diet   Order Comments: Diet as tolerated. Return to diet before surgery.     Discharge Instructions - Lifting Limit (specify)   Order Comments: Lifting limit of  10 pounds until seen at Post-op follow up appointment.     Return to Clinic - in 2 weeks   Order Comments: Return to Clinic in 2 weeks     Discharge Instructions   Order Comments: Check with Provider for instructions about when to start anticoagulant medication.     No Aspirin or NSAID products   Order Comments: No aspirin or non-steroidal anti-inflammatory drugs (NSAIDs) such as ibuprofen or naproxen until cleared at post-operative appointment     No driving or operating machinery while in a cervical collar   Order Comments: Until follow-up appointment.     Reason for your hospital stay   Order Comments: Anterior cervical discectomy and fusion surgery     Follow-up and recommended labs and tests   Order Comments: Follow up with Dr Nowak 2 weeks after surgery     Activity   Order Comments: Your activity upon discharge: Weight bear as tolerated     Order Specific Question Answer Comments   Is discharge order? Yes      Discharge Instructions   Order Comments: Care after Spine  Surgery - Dr. Brady Nowak    The following information will help you through your recovery at home.    Pain  - It is normal for you to experience some pain in the area of your incision after your surgery, and often between the shoulder blades.  This will improve over the coming couple of weeks.    - You should call Dr. Nowak's office if arm pain returns suddenly and does not improve over 24 hours.    Bracing:  - You should have received a soft cervical collar during the hospital.  You should wear this most of the time for the first two weeks after surgery, but you may remove it briefly for showering, hygiene, and eating.      Home Medications  - If you take a blood thinner (e.g. aspirin, warfarin, Xeralto, Eliquis, etc...) at baseline, wait until two days after the operation to start taking it again.  This is to lower the risk of a blood collection pushing on the nerves in the surgery site.  If after starting your blood thinner again, if you notice severe worsening neck swelling or arm pain, contact Dr Nowak's office immediately, as this could represent a blood collection forming.    Activity  - You may increase your activity as tolerated; walking is the best form of exercise after spine surgery.    - For six weeks after surgery avoid bending, lifting, and twisting (BLT).  Avoid activities such as vacuuming, raking and shoveling.   - Try to limit your lifting to 10 lbs during the first 2 weeks and then increase to 20-25lbs over the next 6 weeks.  - You may return to work approximately 1- 2 weeks after your surgery if you have a sedentary or desk type job.  If you have a physical job Dr. Nowak and his staff will help you determine a return to work plan.    - You may resume sexual activity when you feel ready.  Stop if you have pain.    Driving  - You may drive if you feel strong enough and are not taking any narcotic pain medications.    Incision Site   - Keep a dressing over your incision for the first week.  If the  dressing you left the hospital with remains intact and in place, with no water beneath it, you may leave this on.  If water gets underneath it, you should remove it and replace it with a dry gauze dressing (can place a tegaderm or tape over the top of the gauze).  Wash hands prior to changing the dressing. If after a week there is no drainage on dressing, you may leave the incision open to air.  If there is ongoing drainage five days after surgery, please contact Dr Nowak's office.      Pain Management   - Take your prescribed pain medication as needed and directed.  Use Tylenol for your discomfort when you no longer need the narcotic pain medication.   If you have had a fusion, do not use ibuprofen or other NSAIDs unless you talk to Dr Nowak about it first.  If you had a disc replacement, you should have been prescribed Indomethacin at time of discharge - if you did not receive this, please contact Dr Nowak's office.  - Please take caution if taking the prescribed oxycodone along with your home pain medications. Recommend avoiding concomitant use of tramadol, Valium, baclofen, and oxycodone, as this could lead to respiratory depression, somnolence, and/or other concerns.    - If you need a refill on your pain medication call [phone], please allow 24 hours for your prescription to be refilled, Dr. Nowak's office does not refill pain medications on Friday afternoons.    Diet   - Eat a healthy diet; this will help your recovery.  - Drink plenty of fluids, water, milk or juice.  - If you have trouble with constipation you should eat more fiber, drink more fluids, increase your walking or try an over the counter laxative.    Follow-up Visits  - You should have a post-op appointment that was scheduled for you at the same time your surgery was scheduled. If you do not, please call Dr. Nowak's office when you get home from your surgery.  - Write down any questions you have about your surgery, recovery, return to work and  other topics you wished to be covered at your post-op visit.  This way, we will be able to address all of your questions at your next visit.  - Call Dr. Nowak's office if you have any questions or concerns.    When to Call your Doctor:  - If you have any redness, warmth or swelling at the incision site.  - If your incision opens up.  - If you have increasing drainage from your incision.  - If you have a temperature greater than 100.5 degrees Fahrenheit.  - If you develop dramatic swelling in the front of your neck.  If this swelling is making it difficult to breathe, go immediately to the emergency department, by ambulance if necessary.     Diet   Order Comments: Follow this diet upon discharge: Regular diet     Order Specific Question Answer Comments   Is discharge order? Yes        Shari Wilcox PA-C  Orthopaedic Spine Surgery  [facility]

## 2023-01-24 ENCOUNTER — PATIENT OUTREACH (OUTPATIENT)
Dept: CARE COORDINATION | Facility: CLINIC | Age: 73
End: 2023-01-24
Payer: COMMERCIAL

## 2023-01-24 NOTE — PROGRESS NOTES
Clinic Care Coordination Contact    Follow Up Progress Note      Assessment: PC to Mariela. She reports she had a spinal fusion yesterday and is doing okay despite some neck pain. She has had some home health aides coming to help with a.m. and p.m support but states that they have  not been consistent. She would prefer to use LTC insurance to pay for it. [facility] encouraged her to call her LTC company, [employer] and see if they have preferred providers for home care. [facility] called [facility] and they can work with LTC and can provide the hours that she needs. [facility] called Mariela and left her a vm stating to call them to pursue supports.    Care Gaps:    Health Maintenance Due   Topic Date Due     DEXA  Never done     ADVANCE CARE PLANNING  Never done     COLORECTAL CANCER SCREENING  Never done     HEPATITIS C SCREENING  Never done     DTAP/TDAP/TD IMMUNIZATION (1 - Tdap) Never done     LIPID  Never done     Pneumococcal Vaccine: 65+ Years (2 - PCV) 09/25/2012     MEDICARE ANNUAL WELLNESS VISIT  Never done     COVID-19 Vaccine (4 - Booster for Pfizer series) 12/29/2021     INFLUENZA VACCINE (1) 09/01/2022     PHQ-2 (once per calendar year)  Never done           Care Plans      Intervention/Education provided during outreach:      Outreach Frequency: 2 weeks        Plan:   Pt to contact home instead  Care Coordinator will follow up in 2-3 days

## 2023-01-25 NOTE — PROGRESS NOTES
Clinic Care Coordination Contact  [facility]/Voicemail       Clinical Data: Care Coordinator Outreach  Outreach attempted x 2.  Left message on patient's voicemail with call back information and requested return call.  . Care Coordinator will try to reach patient again in 10 business days.    KRISTEL Manning  , Care Coordination  [facility]  [phone]  [email or web address]

## 2023-02-07 ENCOUNTER — PATIENT OUTREACH (OUTPATIENT)
Dept: CARE COORDINATION | Facility: CLINIC | Age: 73
End: 2023-02-07
Payer: COMMERCIAL

## 2023-02-07 NOTE — PROGRESS NOTES
Clinic Care Coordination Contact    Follow Up Progress Note      Assessment:   PC from Mariela. She reports that the people helping her  put in their notice and will stop services effective next Tuesday. She has reached out to [facility] [employer] and they will be coming to meet with her tomorrow. In addition to this, she has reached out to another home care agency. At this time, she does not feel she needs additional support but will call if she should need anything.  Care Gaps:    Health Maintenance Due   Topic Date Due     DEXA  Never done     ADVANCE CARE PLANNING  Never done     COLORECTAL CANCER SCREENING  Never done     HEPATITIS C SCREENING  Never done     DTAP/TDAP/TD IMMUNIZATION (1 - Tdap) Never done     LIPID  Never done     Pneumococcal Vaccine: 65+ Years (2 - PCV) 09/25/2012     MEDICARE ANNUAL WELLNESS VISIT  Never done     COVID-19 Vaccine (4 - Booster for Pfizer series) 12/29/2021     INFLUENZA VACCINE (1) 09/01/2022     PHQ-2 (once per calendar year)  Never done           Care Plans      Intervention/Education provided during outreach:      Outreach Frequency: 2 weeks        Plan:   Pt to continue with home cares as arranged,  Care Coordinator will do no further outreaches.

## 2023-02-07 NOTE — PROGRESS NOTES
Clinic Care Coordination Contact  [facility]/Voicemail       Clinical Data: Care Coordinator Outreach  Outreach attempted x 3.  Left message on patient's voicemail with call back information and requested return call.  . Care Coordinator will try to reach patient again in 10 business days.    KRISTEL Manning  , Care Coordination  [facility]  [phone]  [email or web address]

## 2023-04-06 ENCOUNTER — HOSPITAL ENCOUNTER (OUTPATIENT)
Dept: MAMMOGRAPHY | Facility: CLINIC | Age: 73
Discharge: HOME | End: 2023-04-06
Attending: FAMILY MEDICINE | Admitting: FAMILY MEDICINE
Payer: COMMERCIAL

## 2023-04-06 DIAGNOSIS — C50.412 MALIGNANT NEOPLASM OF UPPER-OUTER QUADRANT OF LEFT FEMALE BREAST, UNSPECIFIED ESTROGEN RECEPTOR STATUS (H): ICD-10-CM

## 2023-04-06 DIAGNOSIS — Z12.31 VISIT FOR SCREENING MAMMOGRAM: ICD-10-CM

## 2023-04-06 PROCEDURE — 77067 SCR MAMMO BI INCL CAD: CPT

## 2023-05-27 ENCOUNTER — HEALTH MAINTENANCE LETTER (OUTPATIENT)
Age: 73
End: 2023-05-27

## 2023-06-08 ENCOUNTER — TRANSCRIBE ORDERS (OUTPATIENT)
Dept: OTHER | Age: 73
End: 2023-06-08

## 2023-06-08 DIAGNOSIS — G35 MULTIPLE SCLEROSIS (H): Primary | ICD-10-CM

## 2023-06-13 NOTE — PROGRESS NOTES
OCCUPATIONAL THERAPY EVALUATION  Type of Visit: Evaluation    See electronic medical record for Abuse and Falls Screening details.    Subjective      Presenting condition or subjective complaint: Difficulty using hand, opening right hand, and bilateral hand weakness.  Date of onset: 06/08/23 (MD order)    Relevant medical history: Bladder or bowel problems; Cancer; Foot drop; High blood pressure; Incontinence; Multiple Sclerosis; Osteoporosis; Overweight; Radiation treatment; Smoking   Dates & types of surgery:      Prior diagnostic imaging/testing results: MRI     Prior therapy history for the same diagnosis, illness or injury: Yes about 2 years ago    Prior Level of Function   Transfers: Completely dependent  Ambulation: Pt uses powered wheelchair  ADL: Assistive equipment and person     Living Environment  Social support: With a significant other or spouse   Type of home: Apartment/condo   Stairs to enter the home:         Ramp:     Stairs inside the home:         Help at home: Self Cares (home health aide/personal care attendant, family, etc)  Equipment owned: Power wheelchair or scooter     Employment: No Retired insurance  Hobbies/Interests: cooking    Patient goals for therapy: Use hands more    Pain assessment: Pain denied    Upper Extremity Functional Index Score:  SCORE:   Column Totals: /80: 22   (A lower score indicates greater disability.)    Objective   Right hand dominant  Patient reports symptoms of stiffness/loss of motion, weakness/loss of strength, numbness and tingling     Pain Level (Scale 0-10)   6/15/2023   At rest and use R: 0  L: 0     Sensation   Bilateral numbness and tingling secondary to MS at baseline.     ROM  Pain Report: - none  + mild    ++ moderate    +++ severe   Wrist 6/15/2023 6/15/2023   AROM (PROM) Left Right   Extension 85 75    Flexion 65 60   Supination 90 90   Pronation 60 55     ROM  Hand 6/15/2023 6/15/2023   AROM(PROM) Left Right   Index MP +/96 -80/104   PIP 0/103  -110/120   DIP 0/71 -40/NT   CARUSO     Long MP 0/105 -90/107   PIP 0/112 -100/105   DIP 0/80 -55/NT   CARUSO     Ring MP 0/101 -75/95   PIP -77(-45)/105 -112/120   DIP +/30 -40/NT   CARUSO     Small MP +/90 -65/100   PIP -38/100 -95/105   DIP -35/75 -60/NT   CARUSO       ROM  Thumb 6/15/2023 6/15/2023   AROM  (PROM) Left Right   MP -5/80 -45/75   IP +/90 -77/96     Strength   (Measured in pounds)  Pain Report: - none  + mild    ++ moderate    +++ severe    6/15/2023 6/15/2023   Trials Left Right   1 16 11   Average 16 11     Lat Pinch 6/15/2023 6/15/2023   Trials Left Right   1 12 7   Average 12 7         Assessment & Plan   CLINICAL IMPRESSIONS   Medical Diagnosis: Bilateral hand weakness/MS    Treatment Diagnosis: Bilateral Hand weakness    Impression/Assessment: Pt is a 73 year old female presenting to Occupational Therapy due to bilateral hand weakness secondary to MS.  The following significant findings have been identified: Impaired ROM, Impaired sensation and Impaired strength.  These identified deficits interfere with their ability to perform self care tasks, recreational activities, household chores, driving  and meal planning and preparation as compared to previous level of function.     Clinical Decision Making (Complexity):   Assessment of Occupational Performance: 5 or more Performance Deficits  Occupational Performance Limitations: bathing/showering, toileting, dressing, feeding, hygiene and grooming, care of pets, home establishment and management, meal preparation and cleanup, shopping and leisure activities  Clinical Decision Making (Complexity): Low complexity    PLAN OF CARE  Treatment Interventions:   Therapeutic Exercise:  AROM, PROM, Isotonics and Isometrics  Neuromuscular re-education:  Coordination/Dexterity and Isometrics  Manual Techniques:  Joint mobilization  Orthotic Fabrication:  Finger based, Hand based and Forearm based  Self Care:  Self Care Tasks    Long Term Goals   OT Goal 1  Goal  Identifier: household chores  Goal Description: Pt will improve  strength by 5 lbs to improve ability to carry shopping bags.  Rationale: In order to maximize safety and independence with performance of self-care activities  Target Date: 08/15/23      Frequency of Treatment: 2x/month  Duration of Treatment: 2 months     Education Assessment: Learner/Method: Patient     Risks and benefits of evaluation/treatment have been explained.   Patient/Family/caregiver agrees with Plan of Care.     Evaluation Time:    OT Eval, Low Complexity Minutes (64382): 30    Home Exercise Program:  L LF extension splint - intermittently through day (1 hour on, 2-3 hours off)  R Thumb IPJ extension splint -  intermittently through day (1 hour on, 2-3 hours off)  PROM finger extension - passive on R  AROM finger extension - left   strength with stress ball vs foam wedge bilaterally  Lateral pinch with foam wedge    Next Visit:  Check response to splints  Progress AROM/PROM and strengthening as indicated    Signing Clinician: LACHO [facility]                                                                                   OUTPATIENT OCCUPATIONAL THERAPY      PLAN OF TREATMENT FOR OUTPATIENT REHABILITATION   Patient's Last Name, First Name, YENRodoCALRodo  AlejandroMariela    YOB: 1950   Provider's Name   [facility]   Medical Record No.  [id number]     Onset Date: 06/08/23 (MD order) Start of Care Date: 06/15/23     Medical Diagnosis:  Bilateral hand weakness/MS      OT Treatment Diagnosis:  Bilateral Hand weakness Plan of Treatment  Frequency/Duration:2x/month/2 months    Certification date from 06/15/23   To 08/15/23        See note for plan of treatment details and functional goals     Yessica Charlton OT                         I CERTIFY THE NEED FOR THESE SERVICES FURNISHED UNDER                    THIS PLAN OF TREATMENT AND WHILE UNDER   CARE .             Physician Signature                                       Date    X_____________________________________________________  .                  Referring Provider:  Benjy Springer      Initial Assessment  See Epic Evaluation- 06/15/23

## 2023-06-15 ENCOUNTER — THERAPY VISIT (OUTPATIENT)
Dept: OCCUPATIONAL THERAPY | Facility: CLINIC | Age: 73
End: 2023-06-15
Attending: PSYCHIATRY & NEUROLOGY
Payer: COMMERCIAL

## 2023-06-15 DIAGNOSIS — R29.898 WEAKNESS OF BOTH HANDS: ICD-10-CM

## 2023-06-15 DIAGNOSIS — G35 MULTIPLE SCLEROSIS (H): Primary | ICD-10-CM

## 2023-06-15 PROCEDURE — 97110 THERAPEUTIC EXERCISES: CPT | Mod: GO | Performed by: OCCUPATIONAL THERAPIST

## 2023-06-15 PROCEDURE — 97165 OT EVAL LOW COMPLEX 30 MIN: CPT | Mod: GO | Performed by: OCCUPATIONAL THERAPIST

## 2023-06-15 PROCEDURE — 97760 ORTHOTIC MGMT&TRAING 1ST ENC: CPT | Mod: GO | Performed by: OCCUPATIONAL THERAPIST

## 2023-08-08 ENCOUNTER — THERAPY VISIT (OUTPATIENT)
Dept: OCCUPATIONAL THERAPY | Facility: CLINIC | Age: 73
End: 2023-08-08
Payer: COMMERCIAL

## 2023-08-08 DIAGNOSIS — G35 MULTIPLE SCLEROSIS (H): ICD-10-CM

## 2023-08-08 DIAGNOSIS — R29.898 WEAKNESS OF BOTH HANDS: Primary | ICD-10-CM

## 2023-08-08 PROCEDURE — 97763 ORTHC/PROSTC MGMT SBSQ ENC: CPT | Mod: GO | Performed by: OCCUPATIONAL THERAPIST

## 2023-08-08 PROCEDURE — 97110 THERAPEUTIC EXERCISES: CPT | Mod: GO | Performed by: OCCUPATIONAL THERAPIST

## 2023-08-08 NOTE — PROGRESS NOTES
08/08/23 0500   Appointment Info   Treating Provider Yessica Charlton OTR/L, CHT   Total/Authorized Visits 4(POC)   Visits Used 2   Medical Diagnosis Bilateral hand weakness/MS   OT Tx Diagnosis Bilateral Hand weakness   Quick Add  Certification   Progress Note/Certification   Start Of Care Date 06/15/23   Onset of Illness/Injury or Date of Surgery 06/08/23  (MD order)   Therapy Frequency 2x/month   Predicted Duration 2 months   Certification date from 08/16/23   Certification date to 10/28/23   Progress Note Due Date 08/08/23   Progress Note Completed Date 09/06/23   OT Goal 1   Goal Identifier household chores   Goal Description Pt will improve  strength by 5 lbs to improve ability to carry shopping bags.   Rationale In order to maximize safety and independence with performance of self-care activities   Goal Progress  has increased by 1 and 2lbs, respectively. Continue and extend goal   Target Date 10/28/23   Subjective Report   Subjective Report The exercises have been going well. The smaller splints we made didn't stay on the hand.   Objective Measures   Objective Measures Objective Measure 1;Objective Measure 2;Objective Measure 3;Objective Measure 4;Objective Measure 5;Objective Measure 6;Objective Measure 7   Objective Measure 1   Objective Measure    Details R: 12; L: 18   Objective Measure 2   Objective Measure Lateral Pinch   Details R: 8 L:12   Treatment Interventions (OT)   Interventions Orthotics;Therapeutic Procedure/Exercise   Therapeutic Procedure/Exercise   Therapeutic Procedure: strength, endurance, ROM, flexibillity minutes (32749) 10   Therapeutic Procedures Ther Proc 2;Ther Proc 3   Ther Proc 1  strength   Ther Proc 1 - Details blue foam   Ther Proc 2 pinch   Ther Proc 2 - Details lateral pinch, pink foam R, blue foam L   Ther Proc 3 PROM   Ther Proc 3 - Details R: digit extension   Skilled Intervention Reviewed HEP to ensure doing properly   Patient Response/Progress Good,  increased  by 1 and 2lbs respectively   Orthotics   Type of Orthotic Digit extension orthosis   HAND Splinting Finger/Thumb;Forearm   Finger/Thumb Splint Desciption Extension gutter   Comments L RF extension splint extending into palm for better fit and stretch   Skilled Intervention see above   Patient Response/Progress Good, patient stated both splints felt comfortable   ForeArm Splint Desciption Resting hand splint   Wear Schedule Night;Other   Off for: Hygiene;Exercise;Driving   Comments R Resting hand splint   Orthotic Management, Subsequent session minutes (61074) 45 Minutes   Education   Learner/Method Patient   Plan   Home program PROM finger stretching, AROM finger extension,  with foam wedge, lateral pinch with foam wedge   Updates to plan of care R Resting hand splint, L RF extension splint   Plan for next session check response to splints, progress ROM and strengthening as indicated   Total Session Time   Timed Code Treatment Minutes 55   Total Treatment Time (sum of timed and untimed services) 55         [facility]                                                                                   OUTPATIENT OCCUPATIONAL THERAPY    PLAN OF TREATMENT FOR OUTPATIENT REHABILITATION   Patient's Last Name, First Name, MMariela Lund    YOB: 1950   Provider's Name   [facility]   Medical Record No.  [id number]     Onset Date: 06/08/23 (MD order) Start of Care Date: 06/15/23     Medical Diagnosis:  Bilateral hand weakness/MS      OT Treatment Diagnosis:  Bilateral Hand weakness Plan of Treatment  Frequency/Duration:2x/month/2 months    Certification date from 08/16/23   To 10/28/23        See note for plan of treatment details and functional goals     Yessica Charlton, OT                         I CERTIFY THE NEED FOR THESE SERVICES FURNISHED UNDER        THIS PLAN OF TREATMENT AND WHILE UNDER MY CARE     (Physician  attestation of this document indicates review and certification of the therapy plan).                Referring Provider:  Benjy Ludwig*      Initial Assessment  See Epic Evaluation- 06/15/23

## 2023-09-11 ENCOUNTER — THERAPY VISIT (OUTPATIENT)
Dept: OCCUPATIONAL THERAPY | Facility: CLINIC | Age: 73
End: 2023-09-11
Payer: COMMERCIAL

## 2023-09-11 DIAGNOSIS — G35 MULTIPLE SCLEROSIS (H): ICD-10-CM

## 2023-09-11 DIAGNOSIS — R29.898 WEAKNESS OF BOTH HANDS: Primary | ICD-10-CM

## 2023-09-11 PROCEDURE — 97110 THERAPEUTIC EXERCISES: CPT | Mod: GO | Performed by: OCCUPATIONAL THERAPIST

## 2023-11-15 ENCOUNTER — ANCILLARY PROCEDURE (OUTPATIENT)
Dept: BONE DENSITY | Facility: CLINIC | Age: 73
End: 2023-11-15
Attending: FAMILY MEDICINE
Payer: COMMERCIAL

## 2023-11-15 DIAGNOSIS — M81.0 OSTEOPOROSIS: ICD-10-CM

## 2023-11-15 PROCEDURE — 77080 DXA BONE DENSITY AXIAL: CPT | Performed by: INTERNAL MEDICINE

## 2024-04-15 ENCOUNTER — HOSPITAL ENCOUNTER (OUTPATIENT)
Dept: MAMMOGRAPHY | Facility: CLINIC | Age: 74
Discharge: HOME | End: 2024-04-15
Attending: INTERNAL MEDICINE | Admitting: INTERNAL MEDICINE
Payer: COMMERCIAL

## 2024-04-15 DIAGNOSIS — Z12.31 VISIT FOR SCREENING MAMMOGRAM: ICD-10-CM

## 2024-04-15 PROCEDURE — 77063 BREAST TOMOSYNTHESIS BI: CPT

## 2024-04-30 ENCOUNTER — HOSPITAL ENCOUNTER (OUTPATIENT)
Dept: MAMMOGRAPHY | Facility: CLINIC | Age: 74
Discharge: HOME | End: 2024-04-30
Attending: INTERNAL MEDICINE
Payer: COMMERCIAL

## 2024-04-30 DIAGNOSIS — R92.8 ABNORMAL MAMMOGRAM: ICD-10-CM

## 2024-04-30 PROCEDURE — 77065 DX MAMMO INCL CAD UNI: CPT | Mod: RT

## 2024-04-30 PROCEDURE — 76642 ULTRASOUND BREAST LIMITED: CPT | Mod: RT

## 2024-05-15 ENCOUNTER — HOSPITAL ENCOUNTER (OUTPATIENT)
Dept: MAMMOGRAPHY | Facility: CLINIC | Age: 74
Discharge: HOME | End: 2024-05-15
Attending: INTERNAL MEDICINE
Payer: COMMERCIAL

## 2024-05-15 DIAGNOSIS — R92.8 ABNORMAL MAMMOGRAM: ICD-10-CM

## 2024-05-15 PROCEDURE — 272N000615 US BREAST BIOPSY CORE NEEDLE RIGHT

## 2024-05-15 PROCEDURE — 88305 TISSUE EXAM BY PATHOLOGIST: CPT | Mod: 26 | Performed by: PATHOLOGY

## 2024-05-15 PROCEDURE — 88342 IMHCHEM/IMCYTCHM 1ST ANTB: CPT | Mod: 26 | Performed by: PATHOLOGY

## 2024-05-15 PROCEDURE — 999N000065 MA POST PROCEDURE RIGHT

## 2024-05-15 PROCEDURE — 88360 TUMOR IMMUNOHISTOCHEM/MANUAL: CPT | Mod: 26 | Performed by: PATHOLOGY

## 2024-05-15 PROCEDURE — 250N000009 HC RX 250: Performed by: INTERNAL MEDICINE

## 2024-05-15 PROCEDURE — 88342 IMHCHEM/IMCYTCHM 1ST ANTB: CPT | Mod: TC,XU | Performed by: INTERNAL MEDICINE

## 2024-05-15 RX ADMIN — LIDOCAINE HYDROCHLORIDE 5 ML: 10 INJECTION, SOLUTION INFILTRATION; PERINEURAL at 13:19

## 2024-05-15 NOTE — DISCHARGE INSTRUCTIONS
After Your Breast Biopsy  Bleeding, bruising, and pain  Breast tenderness and some bruising is normal and may last several days. You may wear your bra overnight to support the breast.  You may use an ice pack for pain. Place it over the area for 15 to 20 minutes, several times a day.  You may take over-the-counter pain medicine:  On the day of the biopsy, we recommend Tylenol (acetaminophen) because it does not raise your risk of bleeding.  The next day, you may take an anti-inflammatory medicine (aspirin, ibuprofen, Motrin, Aleve, Advil), unless your doctor tells you not to.  Bandages and showering  Keep your bandage in place until tomorrow morning. Don't get it wet.  If you have small pieces of tape on the skin, leave them in place. They will fall off on their own, or you can remove them after 5 days.  You may shower the next morning after your biopsy.  Activity  No heavy activity (no running, no gym workouts, no lifting, no vacuuming, etc.) on the day of your biopsy.  You may go back to normal activity the next day. But limit what you do if you still have pain or discomfort.  Infection  Infection is rare. Signs of infection include:  Fever (including sweats and chills)  Redness  Pain that gets worse  Fluid draining from the biopsy site  Biopsy results  Results may take up to 5 business days.  A nurse or doctor from the Breast Center will call with your results. We will also send the results to the doctor that ordered your biopsy.  If you have not gotten your results in 5 days, please call the Breast Center.  Call the Breast Center with questions or if:   You have bleeding that lasts more than 20 minutes.  You have pain that you can't control.  You have signs of infection (fever, sweats, chills, redness, increasing pain, or drainage).  After hours, please call the doctor who ordered your biopsy.  For informational purposes only. Not to replace the advice of your health care provider. Copyright   2010 [facility]. All rights reserved. Clinically reviewed by Ada Stiles, Director, [facility]. [employer] [id number] - REV 08/23.

## 2024-05-17 ENCOUNTER — TELEPHONE (OUTPATIENT)
Dept: MAMMOGRAPHY | Facility: CLINIC | Age: 74
End: 2024-05-17
Payer: COMMERCIAL

## 2024-05-17 LAB
PATH REPORT.COMMENTS IMP SPEC: ABNORMAL
PATH REPORT.COMMENTS IMP SPEC: ABNORMAL
PATH REPORT.COMMENTS IMP SPEC: YES
PATH REPORT.FINAL DX SPEC: ABNORMAL
PATH REPORT.GROSS SPEC: ABNORMAL
PATH REPORT.MICROSCOPIC SPEC OTHER STN: ABNORMAL
PATH REPORT.MICROSCOPIC SPEC OTHER STN: ABNORMAL
PATHOLOGY SYNOPTIC REPORT: ABNORMAL
PHOTO IMAGE: ABNORMAL

## 2024-05-17 NOTE — PROGRESS NOTES
Malignant Path:  Pathology report reviewed with our breast radiologist Dr. Chris Cruz, who confirmed the recent breast imaging is concordant with the final surgical pathology results below.     Ultrasound Guided Right Breast Biopsy results show Invasive Ductal Carcinoma, grade 1.  Estrogen/ Progesterone Receptors are (+) positive, and HER2 is (-) negative.  Recommended follow up is Medical Oncology follow up and Surgical Consultation.     I spoke with Dr. Carrera's nurse - Luz who will be sure to inform Dr. Carrera of patient's biopsy results.  Luz states Dr. Carrera will be calling patient to inform her of the results and place order to get patient scheduled for her Surgical Consultation with [facility].  Patient has a follow up clinic visit with Dr. Carrera on 5/21/24.  She has a history of left breast cancer, s/p left breast lumpectomy 2021.     Nurse Luz will call me back with update once Dr. Carrera has informed patient and patient is scheduled for the surgical consultation.  Nurse Luz has my contact information.    Nurse Escobar called me back and states patient would like to see Dr. Boucher for surgical consultation.  I will work on getting patient scheduled to see Dr. Boucher.  Mandy Poole RN, BSN  Breast Care Nurse Coordinator  [facility] [place]  [phone]        Mariela Allen [id number]  F, 1950  Surgical Pathology Report (Final result) [id number]  Authorizing Provider: Pablo Carrera MD Ordering Provider: Pablo Carrera MD  Ordering Location: [facility]  Collected: 05/15/2024 01:41 PM  Pathologist: Leanne Engle MD Received: 05/15/2024 02:07 PM  .  Specimens  A Breast, Right  .  .  Final Diagnosis  A. Right breast, 12:00, 1 cm from the nipple, 0.8 cm size, ultrasound-guided core biopsy:  -INVASIVE CARCINOMA OF NO SPECIAL TYPE (DUCTAL), WITH TUBULAR FEATURES.  -[place] Grade:  1 (Tubule score = 2, Nuclear score = 1, Mitotic score = 1).  -Size: At least 10 mm.  -Lymphatic/Vascular Invasion: Not identified.  -Ductal Carcinoma in situ (DCIS): Not identified.  -Lobular Carcinoma in situ (LCIS): Not identified.  -Microcalcifications: Not identified.  -ESTROGEN RECEPTOR (ER): Positive, %, strong  -PROGESTERONE RECEPTOR (ME): Positive, %, strong  -HER2(IHC): 1+  -See Breast Biomarker Reporting Template  Electronically signed by Leanne Engle MD on 5/17/2024 at 10:01 AM

## 2024-05-17 NOTE — TELEPHONE ENCOUNTER
Luz from [facility] called me back to inform that patient has been notified of results and has seen Dr. Mariel Boucher in the past, so would like to return to see her.    I went ahead and scheduled for patient to see Dr. Mariel Boucher on 5/31/24 at 9:45 a.m., with a check in time of 9:30 a.m. at the [facility].    I tried calling patient this afternoon to inform, but got her voicemail.  I left patient a message to call me back when available.      I am reaching out to patient to inform her of the Surgical Consultation I scheduled for her on 5/31/24 at 9:45 a.m. with Dr. [facility]cher [facility].    Mandy Poole RN BSN  Breast Nurse Care Coordinator  [facility]  [phone]

## 2024-05-17 NOTE — TELEPHONE ENCOUNTER
Due to patient needing [employer] and a Home Health Aide to help her in the morning, she is requesting a surgical consult with Dr. Boucher after 11:00 a.m.    Patient is now scheduled to see Dr. Boucher on 5/31/24 at 11:00 a.m. at the [facility].    Patient has the clinic directions and contact information if needed.    Patient verbalizes understanding and agrees with the plan of care.  Mandy Poole RN BSN  Breast Nurse Care Coordinator  [facility] [place]  [phone]

## 2024-05-17 NOTE — TELEPHONE ENCOUNTER
Malignant Path:  Pathology report reviewed with our breast radiologist Dr. Chris Cruz, who confirmed the recent breast imaging is concordant with the final surgical pathology results below.    Ultrasound Guided Right Breast Biopsy results show Invasive Ductal Carcinoma, grade 1.  Estrogen/ Progesterone Receptors are (+) positive, and HER2 is (-) negative.  Recommended follow up is Medical Oncology follow up and Surgical Consultation.    I spoke with Dr. Carrera's nurse - Luz who will be sure to inform Dr. Crarera of patient's biopsy results.  Luz states Dr. Carrera will be calling patient to inform her of the results and place order to get patient scheduled for her Surgical Consultation with [facility].  Patient has a follow up clinic visit with Dr. Carrera on 5/21/24.  She has a history of left breast cancer, s/p left breast lumpectomy 2021.    Nurse Luz will call me back with update once Dr. Carrera has informed patient and patient is scheduled for the surgical consultation.  Nurse Escobar has my contact information.  Mandy Poole RN, BSN  Breast Care Nurse Coordinator  [facility] [place]  [phone]      Mariela Allen [id number]  F, 1950  Surgical Pathology Report (Final result) [id number]  Authorizing Provider: Pablo Carrera MD Ordering Provider: Pablo Carrera MD  Ordering Location: [facility]  Collected: 05/15/2024 01:41 PM  Pathologist: Leanne Engle MD Received: 05/15/2024 02:07 PM  .  Specimens  A Breast, Right  .  .  Final Diagnosis  A. Right breast, 12:00, 1 cm from the nipple, 0.8 cm size, ultrasound-guided core biopsy:  -INVASIVE CARCINOMA OF NO SPECIAL TYPE (DUCTAL), WITH TUBULAR FEATURES.  -[place] Grade: 1 (Tubule score = 2, Nuclear score = 1, Mitotic score = 1).  -Size: At least 10 mm.  -Lymphatic/Vascular Invasion: Not identified.  -Ductal Carcinoma in situ (DCIS): Not  identified.  -Lobular Carcinoma in situ (LCIS): Not identified.  -Microcalcifications: Not identified.  -ESTROGEN RECEPTOR (ER): Positive, %, strong  -PROGESTERONE RECEPTOR ([place]): Positive, %, strong  -HER2(IHC): 1+  -See Breast Biomarker Reporting Template  Electronically signed by Leanne Engle MD on 5/17/2024 at 10:01 AM

## 2024-05-17 NOTE — PROGRESS NOTES
Due to patient needing [employer] and a Home Health Aide to help her in the morning, she is requesting a surgical consult with Dr. Boucher after 11:00 a.m.     Patient is now scheduled to see Dr. Boucher on 5/31/24 at 11:00 a.m. at the [facility].     Patient has the clinic directions and contact information if needed.   Mandy Poole RN BSN  Breast Nurse Care Coordinator  [facility] [place]  [phone]

## 2024-05-21 ENCOUNTER — TRANSFERRED RECORDS (OUTPATIENT)
Dept: HEALTH INFORMATION MANAGEMENT | Facility: CLINIC | Age: 74
End: 2024-05-21

## 2024-05-28 ENCOUNTER — TELEPHONE (OUTPATIENT)
Dept: SURGERY | Facility: CLINIC | Age: 74
End: 2024-05-28
Payer: COMMERCIAL

## 2024-05-28 NOTE — TELEPHONE ENCOUNTER
Mariela is scheduled for a new breast cancer consultation on 5/31/24 with Dr. Boucher. She had a meeting with Dr. Carrera (medical oncology), who ordered a chest/abd/pelvis CT which is scheduled on June 3rd.     We discussed that she is can still come in on Friday to discuss her breast cancer diagnosis with Dr. Boucher. The CT results are not neccessary for the discussion.     I introduced myself as her breast nurse navigator and that I will meet her on Friday.     She has my phone number for any additional questions.     Mehreen Bartlett, RN, BSN, PHN  Breast Care Nurse Coordinator  [facility]- [facility] [place]  [phone]

## 2024-05-28 NOTE — TELEPHONE ENCOUNTER
Name of caller: Patient    Reason for Call:  patient is scheduled to see Dr. Boucher on Friday 5/31/24. She was not able to imaging done prior to the appointment. Does she need to reschedule the apt?    New Right Breast IDC, gr. 1, ER/FL(+), Her2(-)     Surgeon:  Mariel Boucher MD    Recent Surgery:  No    If yes, when & what type:  N/A      Best phone number to reach pt at is: [phone]    Ok to leave a message with medical info? Yes.

## 2024-05-31 ENCOUNTER — OFFICE VISIT (OUTPATIENT)
Dept: SURGERY | Facility: CLINIC | Age: 74
End: 2024-05-31
Payer: COMMERCIAL

## 2024-05-31 VITALS
HEART RATE: 90 BPM | BODY MASS INDEX: 25.76 KG/M2 | WEIGHT: 140 LBS | RESPIRATION RATE: 16 BRPM | SYSTOLIC BLOOD PRESSURE: 114 MMHG | HEIGHT: 62 IN | OXYGEN SATURATION: 94 % | DIASTOLIC BLOOD PRESSURE: 72 MMHG

## 2024-05-31 DIAGNOSIS — C50.111 MALIGNANT NEOPLASM OF CENTRAL PORTION OF RIGHT BREAST IN FEMALE, ESTROGEN RECEPTOR POSITIVE (H): Primary | ICD-10-CM

## 2024-05-31 DIAGNOSIS — Z17.0 MALIGNANT NEOPLASM OF CENTRAL PORTION OF RIGHT BREAST IN FEMALE, ESTROGEN RECEPTOR POSITIVE (H): Primary | ICD-10-CM

## 2024-05-31 PROCEDURE — 99205 OFFICE O/P NEW HI 60 MIN: CPT | Performed by: SURGERY

## 2024-05-31 NOTE — NURSING NOTE
Breast Nurse Care Coordination:      I introduced self to patient and explained my role of breast nurse coordinator. I accompanied Mariela to her surgical consultation on 5/31/24 with Dr. Boucher at the [facility] [place].       Mariela was given the new breast cancer patient packet which includes educational material and support resources such as American Cancer Society, Fighting Cancer through Diet and Lifestyle, [employer], [employer], Pathways and the [facility] Breast Cancer Support Group.  I also enclosed a copy of her right breast biopsy pathology report (5/15/24).       At the end of the consultation, we reviewed Mariela's plan of care and education.  The plan is for patient to have a right breast lumpectomy on 6/26/24. She is aware that she will need a pre-op exam with her PCP within 30 days before surgery.       I gave patient educational materials regarding Exercises After Breast Surgery and Lumpectomy. Informed patient for lumpectomy surgery, she will want to have two good supportive sports bras that open in the front to wear the 2 weeks following her surgery. I gave patient information on different options to purchase sports bras.     I answered her questions and encouraged patient to call me back with any future questions or concerns. Mariela has my contact information, and knows to contact me in the future with any questions or concerns.     Mehreen Bartlett, RN, BSN, PHN  Breast Care Nurse Coordinator  [facility] [place]- [facility] [place]  [phone]

## 2024-05-31 NOTE — PROGRESS NOTES
Breast Patients      1-Do you have any of the following symptoms? Other: none  2-In which breast are you having the symptoms? N/A  3-Have you had a Mammogram? Yes  4-Have you ever had a breast cyst drained? No  5-Have you ever had a breast biopsy? Yes  6-Have you ever had Breast Cancer? No   7-Is there a history of Breast Cancer in your family? No  8-Have you ever had Ovarian Cancer? No  9-Is there a history of Ovarian Cancer in your family? Yes   Relationship to you:    Mother  10-Is there a history of Colon Cancer in your family?  No  11-Is there a history of Uterine Cancer in your family?  No  12-Any known genetic abnormalities in your family?  No  13-Summarize your caffeine intake (i.e. coffee, tea, chocolate, soda etc.): very little      14-What age did your periods begin?  14    15-Date your last menstrual period began? N/A  16-Number of full-term pregnancies:  0    17-Your age when your first child was born? 0  18-Did you nurse your children? N/A  19-Are you pregnant now? N/A  20-Have you begun menopause?  N/A  21-Have you had either ovary removed?No  22-Do you have breast implants? No   23-Have you used hormone replacement therapy?  No  24-Have you taken oral contraceptive pills?  Yes, For how many years?  N/A  25-Have you had an intrauterine device (IUD) placed?  No  26-What is your current bra size?  ?

## 2024-05-31 NOTE — PROGRESS NOTES
[facility] Breast Surgery Consultation    HPI:   Mariela Allen is a 74 year old female who is seen in consultation at the request of Dr. Carrera for evaluation of newly diagnosed right breast invasive ductal carcinoma, grade 1 ER 91 to 100% OK 91 to 100% and HER2 negative at 12:00, 1 cm from nipple measuring 0.8 cm in size.      Mariela had a screening mammogram on 4/15/2024 which revealed a possible asymmetry in the right breast at 6:00.  Diagnostic imaging confirmed an irregular mass with spiculated margins in the right breast and by ultrasound revealed at 12:00, 1 cm from nipple an irregular hypoechoic mass measuring 0.8 cm.  She then underwent ultrasound-guided biopsy of this lesion.    She has a history of prior left breast invasive ductal carcinoma, grade 2, ER/OK positive and HER2 negative measuring 1.3 cm for which she went left breast lumpectomy and sentinel lymph node biopsy on 8/15/2018.  She had followed with Dr. Carrera following her surgery.    Mariela has a [facility] s/f multiple sclerosis and mitral stenosis.  Her MS has been stable recently.  She is currently in a wheelchair. She does not drive. She lives with her .     Hormonal history:  Post menopausal,  no HRT, no fertility treatment.  no children, menarche 13.     Family history of breast cancer: No  Family history of ovarian cancer:  Yes - mother, ? In her 50s.   Family history of colon cancer: No  Family history of prostate cancer: No      Hormonal history:  menarche 14, no children, post menopausal, many years OCP use, no HRT, no fertility treatment.     Family history of breast cancer: No  Family history of ovarian cancer:  Yes -mother  Family history of colon cancer: No  Family history of prostate cancer: No      Past Medical History:   has a past medical history of Breast cancer (H), Cancer (H), Chronic pain, Clostridium difficile colitis (2011), Hyperlipidemia, Hypertension, MS (multiple sclerosis) (H), Multiple sclerosis (H),  Nerve pain, Neurogenic bladder, BRITTANY (obstructive sleep apnea), and Recurrent UTI.      Current Outpatient Medications:     anastrozole (ARIMIDEX) 1 MG tablet, Take 1 mg by mouth daily, Disp: , Rfl:     baclofen (LIORESAL) 10 MG tablet, Take 30 mg by mouth 4 times daily (3 x 10 mg = 30 mg), Disp:  , Rfl:     Capsaicin (SALONPAS-HOT) 0.025 % PTCH, Externally apply 1 patch topically daily, Disp: , Rfl:     Cholecalciferol 100 MCG (4000 UT) CAPS, Take 4,000 Units by mouth 2 times daily, Disp: , Rfl:     cyanocobalamin (VITAMIN B-12) 1000 MCG tablet, Take 1,000 mcg by mouth daily, Disp: , Rfl:     diazepam (VALIUM) 5 MG tablet, Take 5 mg by mouth every 6 hours as needed for anxiety, Disp: , Rfl:     methenamine hippurate (HIPREX) 1 g tablet, Take 1 g by mouth 2 times daily, Disp: , Rfl:     Multiple Vitamins-Minerals (CERTAVITE SENIOR/ANTIOXIDANT) TABS, Take 1 tablet by mouth every morning, Disp: , Rfl:     oxyCODONE (ROXICODONE) 5 MG tablet, Take 1 tablet (5 mg) by mouth every 4 hours as needed for moderate pain (4-6), Disp: 20 tablet, Rfl: 0    pramipexole (MIRAPEX) 0.25 MG tablet, Take 0.25 mg by mouth At Bedtime, Disp: , Rfl:     traMADol (ULTRAM) 50 MG tablet, Take 1 tablet (50 mg) by mouth 2 times daily as needed for moderate pain, Disp:  , Rfl:     Past Surgical History:  Past Surgical History:   Procedure Laterality Date    BIOPSY NODE SENTINEL Left 08/15/2018    Procedure: BIOPSY NODE SENTINEL;;  Surgeon: Mariel Boucher MD;  Location: [facility]    BLADDER SURGERY      DISCECTOMY, FUSION CERVICAL ANTERIOR TWO LEVELS, COMBINED N/A 1/18/2023    Procedure: cervical 3 to cervical 5 anterior cervical discectomy and fusion;  Surgeon: Brady Nowak MD;  Location:  OR    LUMPECTOMY BREAST WITH SEED LOCALIZATION Left 08/15/2018    Procedure: LUMPECTOMY BREAST WITH SEED LOCALIZATION;  LEFT SEED LOCALIZED BREAST LUMPECTOMY WITH SENTINEL NODE BIOPSY ;  Surgeon: Mariel Boucher MD;  Location: [facility]    ORTHOPEDIC SURGERY       "ankle           Allergies   Allergen Reactions    Solu-Medrol [Methylprednisolone]      High Dose caused Delirium        Social History:  Social History     Socioeconomic History    Marital status:      Spouse name: Not on file    Number of children: Not on file    Years of education: Not on file    Highest education level: Not on file   Occupational History    Not on file   Tobacco Use    Smoking status: Never    Smokeless tobacco: Never   Substance and Sexual Activity    Alcohol use: Yes     Comment: drinks wine almost daily     Drug use: Not on file    Sexual activity: Not on file   Other Topics Concern    Not on file   Social History Narrative    Not on file     Social Determinants of Health     Financial Resource Strain: Not on file   Food Insecurity: Not on file   Transportation Needs: Not on file   Physical Activity: Not on file   Stress: Not on file   Social Connections: Not on file   Interpersonal Safety: Not on file   Housing Stability: Not on file        ROS:  The 10 point review of systems is negative other than noted in the HPI and above.    PE:  Vitals: /72   Pulse 90   Resp 16   Ht 1.575 m (5' 2\")   Wt 63.5 kg (140 lb)   LMP  (LMP Unknown)   SpO2 94%   BMI 25.61 kg/m    General appearance: well-nourished, sitting comfortably, no apparent distress  Psych: normal affect, pleasant  HEENT:  Head normocephalic and atraumatic, pupils equal and round, conjunctivae clear, mucous membranes moist, external ears and nose normal  Neck: Supple without thyromegaly or masses  Lungs: Respirations unlabored  Lymphatic: No cervical, or supraclavicular lymphadenopathy  Extremities: Without edema  Musculoskeletal:  Normal station and gait  Neurologic: nonfocal, grossly intact times four extremities, alert and oriented times three  Psychiatric: Mood and affect are appropriate  Skin: Without lesions or rashes    Breast:  A bilateral breast exam was performed in the supine position.. Bilateral breasts " were palpated in a circumferential clockwise fashion including the supraclavicular and axillary areas.   Left breast with mild radiation changes to skin.  Dryness of the nipple.  Well-healed lumpectomy scar in the upper outer left breast.  Right breast appears normal.  Nipple is everted.  There are no palpable masses in either breast.    Lymph:       No supraclavicular/infraclavicular adenopathy.   Axillary adenopathy: none    Assessment:  right breast invasive ductal carcinoma, grade 1 ER 91 to 100% ME 91 to 100% and HER2 negative at 12:00, 1 cm from nipple measuring 0.8 cm in size.    Plan:   Mariela Allen is a 74 year old female has newly diagnosed right breast cancer.  I reviewed the imaging and pathology reports with her  and explained the findings.  We talked about the fact that this is invasive ductal carcinoma  that is small in size and was found on screening mammogram.  We discussed the receptor status. We discussed that breast cancer is treated in a multidisciplinary fashion and she will also meet with oncology as well as radiation oncology pending surgical decision making and final pathology results.     We next discussed the surgical options for treatment.  I described the procedures for tag localized oncoplastic partial mastectomy (ie. Lumpectomy) with or without sentinel lymph node biopsy and mastectomy with sentinel lymph node biopsy, possible axillary node dissection including the details of the procedures, the risks, anesthesia and expected recovery.      I reviewed the data regarding lumpectomy and radiation vs mastectomy that that local risk recurrence risk is less than 5% with either lumpectomy or mastectomy and that survival is equivalent with the newer studies actually showing slight survival benefit with lumpectomy and radiation for patients who are candidates for both.  I also explained that since this is a small tumor and was not palpable on clinical breast exam prior to the biopsy, I  would ask radiology to place a radiofrequency ID tag pre-operatively for localization. We discussed the role of oncoplastic lumpectomy. We discussed the risk of margin positivity following partial mastectomy surgery and need for possible return to the operating room for additional procedures if margins are positive.     I advised that lymph node biopsy is recommended whenever we are dealing with invasive breast cancer and described the procedure for sentinel lymph node biopsy.  We discussed that in women over 70 it is reasonable to consider omission of SLNB.  I would not recommend orly evaluation as per choose wisely guidelines.      We talked about post-lumpectomy radiation, the course and usual side effects. We discussed that with lumpectomy, radiation is typically recommended to decrease risk of recurrence. It may be necessary following mastectomy depending on final pathology and if orly involvement is present. We discussed possibility of omitting radiation as well given age >70 and a small mass.     Plan:   Right breast tag localized lumpectomy    60 minutes total time spent on the date of this encounter doing: chart review, review of test results, patient visit, physical exam, education, counseling, developing plan of care, and documenting.    Mariel Boucher MD      Please route or send letter to:  Primary Care Provider (PCP) and Referring Provider

## 2024-06-03 ENCOUNTER — HOSPITAL ENCOUNTER (OUTPATIENT)
Dept: CT IMAGING | Facility: CLINIC | Age: 74
Discharge: HOME | End: 2024-06-03
Attending: INTERNAL MEDICINE | Admitting: INTERNAL MEDICINE
Payer: COMMERCIAL

## 2024-06-03 PROCEDURE — 250N000009 HC RX 250: Performed by: INTERNAL MEDICINE

## 2024-06-03 PROCEDURE — 71260 CT THORAX DX C+: CPT

## 2024-06-03 PROCEDURE — 250N000011 HC RX IP 250 OP 636: Performed by: INTERNAL MEDICINE

## 2024-06-03 RX ORDER — IOPAMIDOL 755 MG/ML
69 INJECTION, SOLUTION INTRAVASCULAR ONCE
Status: COMPLETED | OUTPATIENT
Start: 2024-06-03 | End: 2024-06-03

## 2024-06-03 RX ADMIN — IOPAMIDOL 69 ML: 755 INJECTION, SOLUTION INTRAVENOUS at 11:12

## 2024-06-03 RX ADMIN — SODIUM CHLORIDE 60 ML: 9 INJECTION, SOLUTION INTRAVENOUS at 11:11

## 2024-06-05 ENCOUNTER — TELEPHONE (OUTPATIENT)
Dept: SURGERY | Facility: CLINIC | Age: 74
End: 2024-06-05
Payer: COMMERCIAL

## 2024-06-05 NOTE — TELEPHONE ENCOUNTER
Type of surgery: right breast tag localized lumpectomy  Location of surgery: [facility]  Date and time of surgery: 6/26/24 10:45am  Surgeon: Dr Boucher  Pre-Op Appt Date: pt to schedule  Post-Op Appt Date: pt to schedule   Packet sent out: Yes  Pre-cert/Authorization completed:  Not Applicable  Date: 5/31/24

## 2024-06-05 NOTE — TELEPHONE ENCOUNTER
Breast Care Nurse Coordination:      Preoperative call placed to Mariela today to assess her needs, and check in on whether she has any questions or concerns regarding her upcoming breast surgery.    Patient was recently diagnosed with right breast cancer and is scheduled to have a right lumpectomy by Dr. Boucher on 6/26/24 at the [facility].    Mariela states she has had a preop physical exam with her primary provider for sometime next week. We discussed the day of surgery and what to expect the days and weeks following.     She is scheduled for a post op appointment on 7/23/24 @ 11:45 am at [facility] with Dr. Boucher.     I answered all of patient's questions completely and thoroughly to her satisfaction.  Patient verbalized understanding and agrees with the plan of care.        Mehreen Bartlett, RN, BSN, PHN  Breast Care Nurse Coordinator  [facility]  [facility]  [phone]

## 2024-06-21 NOTE — OR NURSING
6/21 Writer spoke with patient for pre-op instructions for procedure 6/26/24. Pt states she uses [facility] for transport as she uses electric W/C. Explained that she will need either medical ride, or someone to travel with her on [facility] for ride home and stay with her post op. She states she does not have support person for ride home or post-op as her spouse recently passed. She does have a PCA for some hours of care, but not that could travel with her or stay. Writer attempted to give pt resources, she states she will call her PCP.

## 2024-06-25 ENCOUNTER — ANESTHESIA EVENT (OUTPATIENT)
Dept: SURGERY | Facility: CLINIC | Age: 74
End: 2024-06-25
Payer: COMMERCIAL

## 2024-06-25 ENCOUNTER — HOSPITAL ENCOUNTER (OUTPATIENT)
Dept: MAMMOGRAPHY | Facility: CLINIC | Age: 74
Discharge: HOME | End: 2024-06-25
Attending: SURGERY
Payer: COMMERCIAL

## 2024-06-25 DIAGNOSIS — Z17.0 MALIGNANT NEOPLASM OF CENTRAL PORTION OF RIGHT BREAST IN FEMALE, ESTROGEN RECEPTOR POSITIVE (H): ICD-10-CM

## 2024-06-25 DIAGNOSIS — C50.111 MALIGNANT NEOPLASM OF CENTRAL PORTION OF RIGHT BREAST IN FEMALE, ESTROGEN RECEPTOR POSITIVE (H): ICD-10-CM

## 2024-06-25 PROCEDURE — 999N000065 MA POST PROCEDURE RIGHT

## 2024-06-25 PROCEDURE — 19285 PERQ DEV BREAST 1ST US IMAG: CPT | Mod: RT

## 2024-06-25 PROCEDURE — 250N000009 HC RX 250: Performed by: SURGERY

## 2024-06-25 RX ADMIN — LIDOCAINE HYDROCHLORIDE 5 ML: 10 INJECTION, SOLUTION EPIDURAL; INFILTRATION; INTRACAUDAL; PERINEURAL at 14:02

## 2024-06-25 ASSESSMENT — COPD QUESTIONNAIRES: COPD: 0

## 2024-06-25 ASSESSMENT — LIFESTYLE VARIABLES: TOBACCO_USE: 0

## 2024-06-26 ENCOUNTER — HOSPITAL ENCOUNTER (OUTPATIENT)
Dept: MAMMOGRAPHY | Facility: CLINIC | Age: 74
Discharge: HOME | End: 2024-06-26
Attending: SURGERY
Payer: COMMERCIAL

## 2024-06-26 ENCOUNTER — ANESTHESIA (OUTPATIENT)
Dept: SURGERY | Facility: CLINIC | Age: 74
End: 2024-06-26
Payer: COMMERCIAL

## 2024-06-26 ENCOUNTER — HOSPITAL ENCOUNTER (OUTPATIENT)
Facility: CLINIC | Age: 74
Discharge: HOME | End: 2024-06-27
Attending: SURGERY | Admitting: SURGERY
Payer: COMMERCIAL

## 2024-06-26 ENCOUNTER — APPOINTMENT (OUTPATIENT)
Dept: SURGERY | Facility: PHYSICIAN GROUP | Age: 74
End: 2024-06-26
Payer: COMMERCIAL

## 2024-06-26 DIAGNOSIS — G89.18 POST-OP PAIN: Primary | ICD-10-CM

## 2024-06-26 DIAGNOSIS — C50.111 MALIGNANT NEOPLASM OF CENTRAL PORTION OF RIGHT BREAST IN FEMALE, ESTROGEN RECEPTOR POSITIVE (H): ICD-10-CM

## 2024-06-26 DIAGNOSIS — Z17.0 MALIGNANT NEOPLASM OF CENTRAL PORTION OF RIGHT BREAST IN FEMALE, ESTROGEN RECEPTOR POSITIVE (H): ICD-10-CM

## 2024-06-26 PROBLEM — C50.911 BREAST CANCER, RIGHT (H): Status: ACTIVE | Noted: 2024-06-26

## 2024-06-26 PROCEDURE — 258N000003 HC RX IP 258 OP 636: Performed by: ANESTHESIOLOGY

## 2024-06-26 PROCEDURE — 250N000013 HC RX MED GY IP 250 OP 250 PS 637: Performed by: ANESTHESIOLOGY

## 2024-06-26 PROCEDURE — 19301 PARTIAL MASTECTOMY: CPT | Mod: AS | Performed by: PHYSICIAN ASSISTANT

## 2024-06-26 PROCEDURE — 360N000083 HC SURGERY LEVEL 3 W/ FLUORO, PER MIN: Performed by: SURGERY

## 2024-06-26 PROCEDURE — 96361 HYDRATE IV INFUSION ADD-ON: CPT | Mod: 59

## 2024-06-26 PROCEDURE — 258N000003 HC RX IP 258 OP 636: Performed by: PHYSICIAN ASSISTANT

## 2024-06-26 PROCEDURE — 272N000001 HC OR GENERAL SUPPLY STERILE: Performed by: SURGERY

## 2024-06-26 PROCEDURE — 999N000104 MA BREAST SPECIMEN RIGHT OR: Mod: TC

## 2024-06-26 PROCEDURE — 88307 TISSUE EXAM BY PATHOLOGIST: CPT | Mod: TC | Performed by: SURGERY

## 2024-06-26 PROCEDURE — 250N000011 HC RX IP 250 OP 636: Mod: JZ | Performed by: ANESTHESIOLOGY

## 2024-06-26 PROCEDURE — 19301 PARTIAL MASTECTOMY: CPT | Mod: RT | Performed by: SURGERY

## 2024-06-26 PROCEDURE — 88307 TISSUE EXAM BY PATHOLOGIST: CPT | Mod: 26 | Performed by: PATHOLOGY

## 2024-06-26 PROCEDURE — 250N000011 HC RX IP 250 OP 636: Performed by: SURGERY

## 2024-06-26 PROCEDURE — 250N000009 HC RX 250: Performed by: SURGERY

## 2024-06-26 PROCEDURE — 258N000003 HC RX IP 258 OP 636: Performed by: NURSE ANESTHETIST, CERTIFIED REGISTERED

## 2024-06-26 PROCEDURE — 250N000011 HC RX IP 250 OP 636: Mod: JZ | Performed by: NURSE ANESTHETIST, CERTIFIED REGISTERED

## 2024-06-26 PROCEDURE — 999N000141 HC STATISTIC PRE-PROCEDURE NURSING ASSESSMENT: Performed by: SURGERY

## 2024-06-26 PROCEDURE — 88329 PATH CONSLTJ DRG SURG: CPT | Performed by: PATHOLOGY

## 2024-06-26 PROCEDURE — 96360 HYDRATION IV INFUSION INIT: CPT

## 2024-06-26 PROCEDURE — 99100 ANES PT EXTEME AGE<1 YR&>70: CPT | Performed by: NURSE ANESTHETIST, CERTIFIED REGISTERED

## 2024-06-26 PROCEDURE — 250N000013 HC RX MED GY IP 250 OP 250 PS 637: Performed by: PHYSICIAN ASSISTANT

## 2024-06-26 PROCEDURE — 710N000009 HC RECOVERY PHASE 1, LEVEL 1, PER MIN: Performed by: SURGERY

## 2024-06-26 PROCEDURE — 19301 PARTIAL MASTECTOMY: CPT | Performed by: NURSE ANESTHETIST, CERTIFIED REGISTERED

## 2024-06-26 PROCEDURE — 370N000017 HC ANESTHESIA TECHNICAL FEE, PER MIN: Performed by: SURGERY

## 2024-06-26 PROCEDURE — 19301 PARTIAL MASTECTOMY: CPT | Performed by: ANESTHESIOLOGY

## 2024-06-26 RX ORDER — AMOXICILLIN 250 MG
1-2 CAPSULE ORAL 2 TIMES DAILY
Qty: 15 TABLET | Refills: 0 | Status: SHIPPED | OUTPATIENT
Start: 2024-06-26

## 2024-06-26 RX ORDER — ONDANSETRON 2 MG/ML
4 INJECTION INTRAMUSCULAR; INTRAVENOUS EVERY 6 HOURS PRN
Status: DISCONTINUED | OUTPATIENT
Start: 2024-06-26 | End: 2024-06-27 | Stop reason: HOSPADM

## 2024-06-26 RX ORDER — OXYCODONE HYDROCHLORIDE 5 MG/1
5 TABLET ORAL EVERY 6 HOURS PRN
Qty: 5 TABLET | Refills: 0 | Status: SHIPPED | OUTPATIENT
Start: 2024-06-26

## 2024-06-26 RX ORDER — NALOXONE HYDROCHLORIDE 0.4 MG/ML
0.1 INJECTION, SOLUTION INTRAMUSCULAR; INTRAVENOUS; SUBCUTANEOUS
Status: DISCONTINUED | OUTPATIENT
Start: 2024-06-26 | End: 2024-06-26 | Stop reason: HOSPADM

## 2024-06-26 RX ORDER — HYDROMORPHONE HCL IN WATER/PF 6 MG/30 ML
0.4 PATIENT CONTROLLED ANALGESIA SYRINGE INTRAVENOUS
Status: DISCONTINUED | OUTPATIENT
Start: 2024-06-26 | End: 2024-06-27 | Stop reason: HOSPADM

## 2024-06-26 RX ORDER — OXYCODONE HYDROCHLORIDE 5 MG/1
5 TABLET ORAL EVERY 4 HOURS PRN
Status: DISCONTINUED | OUTPATIENT
Start: 2024-06-26 | End: 2024-06-27 | Stop reason: HOSPADM

## 2024-06-26 RX ORDER — PROCHLORPERAZINE MALEATE 5 MG
5 TABLET ORAL EVERY 6 HOURS PRN
Status: DISCONTINUED | OUTPATIENT
Start: 2024-06-26 | End: 2024-06-27 | Stop reason: HOSPADM

## 2024-06-26 RX ORDER — PROPOFOL 10 MG/ML
INJECTION, EMULSION INTRAVENOUS CONTINUOUS PRN
Status: DISCONTINUED | OUTPATIENT
Start: 2024-06-26 | End: 2024-06-26

## 2024-06-26 RX ORDER — OXYCODONE HYDROCHLORIDE 5 MG/1
10 TABLET ORAL EVERY 4 HOURS PRN
Status: DISCONTINUED | OUTPATIENT
Start: 2024-06-26 | End: 2024-06-27 | Stop reason: HOSPADM

## 2024-06-26 RX ORDER — NALOXONE HYDROCHLORIDE 0.4 MG/ML
0.4 INJECTION, SOLUTION INTRAMUSCULAR; INTRAVENOUS; SUBCUTANEOUS
Status: DISCONTINUED | OUTPATIENT
Start: 2024-06-26 | End: 2024-06-27 | Stop reason: HOSPADM

## 2024-06-26 RX ORDER — DIAZEPAM 5 MG
5 TABLET ORAL EVERY 6 HOURS PRN
Status: DISCONTINUED | OUTPATIENT
Start: 2024-06-26 | End: 2024-06-27 | Stop reason: HOSPADM

## 2024-06-26 RX ORDER — FENTANYL CITRATE 0.05 MG/ML
25 INJECTION, SOLUTION INTRAMUSCULAR; INTRAVENOUS EVERY 5 MIN PRN
Status: DISCONTINUED | OUTPATIENT
Start: 2024-06-26 | End: 2024-06-26 | Stop reason: HOSPADM

## 2024-06-26 RX ORDER — NALOXONE HYDROCHLORIDE 0.4 MG/ML
0.2 INJECTION, SOLUTION INTRAMUSCULAR; INTRAVENOUS; SUBCUTANEOUS
Status: DISCONTINUED | OUTPATIENT
Start: 2024-06-26 | End: 2024-06-27 | Stop reason: HOSPADM

## 2024-06-26 RX ORDER — ONDANSETRON 4 MG/1
4 TABLET, ORALLY DISINTEGRATING ORAL EVERY 30 MIN PRN
Status: DISCONTINUED | OUTPATIENT
Start: 2024-06-26 | End: 2024-06-26 | Stop reason: HOSPADM

## 2024-06-26 RX ORDER — ANASTROZOLE 1 MG/1
1 TABLET ORAL DAILY
Status: DISCONTINUED | OUTPATIENT
Start: 2024-06-26 | End: 2024-06-27 | Stop reason: HOSPADM

## 2024-06-26 RX ORDER — CEFAZOLIN SODIUM/WATER 2 G/20 ML
2 SYRINGE (ML) INTRAVENOUS SEE ADMIN INSTRUCTIONS
Status: DISCONTINUED | OUTPATIENT
Start: 2024-06-26 | End: 2024-06-26 | Stop reason: HOSPADM

## 2024-06-26 RX ORDER — FENTANYL CITRATE 0.05 MG/ML
50 INJECTION, SOLUTION INTRAMUSCULAR; INTRAVENOUS EVERY 5 MIN PRN
Status: DISCONTINUED | OUTPATIENT
Start: 2024-06-26 | End: 2024-06-26 | Stop reason: HOSPADM

## 2024-06-26 RX ORDER — SODIUM CHLORIDE, SODIUM LACTATE, POTASSIUM CHLORIDE, CALCIUM CHLORIDE 600; 310; 30; 20 MG/100ML; MG/100ML; MG/100ML; MG/100ML
INJECTION, SOLUTION INTRAVENOUS CONTINUOUS
Status: DISCONTINUED | OUTPATIENT
Start: 2024-06-26 | End: 2024-06-26 | Stop reason: HOSPADM

## 2024-06-26 RX ORDER — LIDOCAINE 40 MG/G
CREAM TOPICAL
Status: DISCONTINUED | OUTPATIENT
Start: 2024-06-26 | End: 2024-06-27 | Stop reason: HOSPADM

## 2024-06-26 RX ORDER — HYDROMORPHONE HCL IN WATER/PF 6 MG/30 ML
0.2 PATIENT CONTROLLED ANALGESIA SYRINGE INTRAVENOUS
Status: DISCONTINUED | OUTPATIENT
Start: 2024-06-26 | End: 2024-06-27 | Stop reason: HOSPADM

## 2024-06-26 RX ORDER — HYDROMORPHONE HCL IN WATER/PF 6 MG/30 ML
0.4 PATIENT CONTROLLED ANALGESIA SYRINGE INTRAVENOUS EVERY 5 MIN PRN
Status: DISCONTINUED | OUTPATIENT
Start: 2024-06-26 | End: 2024-06-26 | Stop reason: HOSPADM

## 2024-06-26 RX ORDER — BACLOFEN 10 MG/1
10 TABLET ORAL ONCE
Status: COMPLETED | OUTPATIENT
Start: 2024-06-26 | End: 2024-06-26

## 2024-06-26 RX ORDER — BACLOFEN 10 MG/1
30 TABLET ORAL 4 TIMES DAILY
Status: DISCONTINUED | OUTPATIENT
Start: 2024-06-26 | End: 2024-06-27 | Stop reason: HOSPADM

## 2024-06-26 RX ORDER — ONDANSETRON 2 MG/ML
4 INJECTION INTRAMUSCULAR; INTRAVENOUS EVERY 30 MIN PRN
Status: DISCONTINUED | OUTPATIENT
Start: 2024-06-26 | End: 2024-06-26 | Stop reason: HOSPADM

## 2024-06-26 RX ORDER — HYDROMORPHONE HCL IN WATER/PF 6 MG/30 ML
0.2 PATIENT CONTROLLED ANALGESIA SYRINGE INTRAVENOUS EVERY 5 MIN PRN
Status: DISCONTINUED | OUTPATIENT
Start: 2024-06-26 | End: 2024-06-26 | Stop reason: HOSPADM

## 2024-06-26 RX ORDER — MAGNESIUM HYDROXIDE 1200 MG/15ML
LIQUID ORAL PRN
Status: DISCONTINUED | OUTPATIENT
Start: 2024-06-26 | End: 2024-06-26 | Stop reason: HOSPADM

## 2024-06-26 RX ORDER — HYDROXYZINE HYDROCHLORIDE 10 MG/1
10 TABLET, FILM COATED ORAL EVERY 6 HOURS PRN
Status: DISCONTINUED | OUTPATIENT
Start: 2024-06-26 | End: 2024-06-27 | Stop reason: HOSPADM

## 2024-06-26 RX ORDER — FAMOTIDINE 20 MG/1
20 TABLET, FILM COATED ORAL 2 TIMES DAILY
Status: DISCONTINUED | OUTPATIENT
Start: 2024-06-26 | End: 2024-06-27 | Stop reason: HOSPADM

## 2024-06-26 RX ORDER — DEXAMETHASONE SODIUM PHOSPHATE 4 MG/ML
4 INJECTION, SOLUTION INTRA-ARTICULAR; INTRALESIONAL; INTRAMUSCULAR; INTRAVENOUS; SOFT TISSUE
Status: COMPLETED | OUTPATIENT
Start: 2024-06-26 | End: 2024-06-26

## 2024-06-26 RX ORDER — SODIUM CHLORIDE 9 MG/ML
INJECTION, SOLUTION INTRAVENOUS CONTINUOUS
Status: DISCONTINUED | OUTPATIENT
Start: 2024-06-26 | End: 2024-06-27

## 2024-06-26 RX ORDER — CEFAZOLIN SODIUM/WATER 2 G/20 ML
2 SYRINGE (ML) INTRAVENOUS
Status: COMPLETED | OUTPATIENT
Start: 2024-06-26 | End: 2024-06-26

## 2024-06-26 RX ORDER — PRAMIPEXOLE DIHYDROCHLORIDE 0.25 MG/1
0.25 TABLET ORAL AT BEDTIME
Status: DISCONTINUED | OUTPATIENT
Start: 2024-06-26 | End: 2024-06-27 | Stop reason: HOSPADM

## 2024-06-26 RX ORDER — ONDANSETRON 4 MG/1
4 TABLET, ORALLY DISINTEGRATING ORAL EVERY 6 HOURS PRN
Status: DISCONTINUED | OUTPATIENT
Start: 2024-06-26 | End: 2024-06-27 | Stop reason: HOSPADM

## 2024-06-26 RX ADMIN — PRAMIPEXOLE DIHYDROCHLORIDE 0.25 MG: 0.25 TABLET ORAL at 21:17

## 2024-06-26 RX ADMIN — BACLOFEN 10 MG: 10 TABLET ORAL at 10:38

## 2024-06-26 RX ADMIN — ANASTROZOLE 1 MG: 1 TABLET, COATED ORAL at 17:12

## 2024-06-26 RX ADMIN — DEXAMETHASONE SODIUM PHOSPHATE 4 MG: 4 INJECTION, SOLUTION INTRA-ARTICULAR; INTRALESIONAL; INTRAMUSCULAR; INTRAVENOUS; SOFT TISSUE at 12:22

## 2024-06-26 RX ADMIN — ONDANSETRON 4 MG: 2 INJECTION INTRAMUSCULAR; INTRAVENOUS at 12:22

## 2024-06-26 RX ADMIN — PHENYLEPHRINE HYDROCHLORIDE 100 MCG: 10 INJECTION INTRAVENOUS at 12:53

## 2024-06-26 RX ADMIN — SODIUM CHLORIDE: 9 INJECTION, SOLUTION INTRAVENOUS at 14:59

## 2024-06-26 RX ADMIN — BACLOFEN 30 MG: 10 TABLET ORAL at 21:12

## 2024-06-26 RX ADMIN — Medication 2 G: at 12:21

## 2024-06-26 RX ADMIN — FENTANYL CITRATE 25 MCG: 50 INJECTION, SOLUTION INTRAMUSCULAR; INTRAVENOUS at 12:22

## 2024-06-26 RX ADMIN — OXYCODONE HYDROCHLORIDE 10 MG: 5 TABLET ORAL at 21:17

## 2024-06-26 RX ADMIN — BACLOFEN 30 MG: 20 TABLET ORAL at 14:03

## 2024-06-26 RX ADMIN — PHENYLEPHRINE HYDROCHLORIDE 100 MCG: 10 INJECTION INTRAVENOUS at 13:09

## 2024-06-26 RX ADMIN — PROPOFOL 200 MCG/KG/MIN: 10 INJECTION, EMULSION INTRAVENOUS at 12:22

## 2024-06-26 RX ADMIN — BACLOFEN 30 MG: 10 TABLET ORAL at 17:12

## 2024-06-26 RX ADMIN — FAMOTIDINE 20 MG: 20 TABLET, FILM COATED ORAL at 21:12

## 2024-06-26 RX ADMIN — SODIUM CHLORIDE, POTASSIUM CHLORIDE, SODIUM LACTATE AND CALCIUM CHLORIDE: 600; 310; 30; 20 INJECTION, SOLUTION INTRAVENOUS at 12:20

## 2024-06-26 ASSESSMENT — ACTIVITIES OF DAILY LIVING (ADL)
ADLS_ACUITY_SCORE: 38
ADLS_ACUITY_SCORE: 34
ADLS_ACUITY_SCORE: 38
ADLS_ACUITY_SCORE: 34
ADLS_ACUITY_SCORE: 38

## 2024-06-26 NOTE — ANESTHESIA POSTPROCEDURE EVALUATION
Patient: Mariela Allen    Procedure: Procedure(s):  right breast tag localized lumpectomy       Anesthesia Type:  MAC    Note:  Disposition: Outpatient   Postop Pain Control: Uneventful            Sign Out: Well controlled pain   PONV: No   Neuro/Psych: Uneventful            Sign Out: Acceptable/Baseline neuro status   Airway/Respiratory: Uneventful            Sign Out: Acceptable/Baseline resp. status   CV/Hemodynamics: Uneventful            Sign Out: Acceptable CV status; No obvious hypovolemia; No obvious fluid overload   Other NRE: NONE   DID A NON-ROUTINE EVENT OCCUR? No       Last vitals:  Vitals Value Taken Time   /65 06/26/24 1430   Temp     Pulse 68 06/26/24 1434   Resp 17 06/26/24 1434   SpO2 94 % 06/26/24 1434   Vitals shown include unfiled device data.    Electronically Signed By: Shaji Rao MD  June 26, 2024  4:35 PM

## 2024-06-26 NOTE — ANESTHESIA CARE TRANSFER NOTE
Patient: Mariela Allen    Procedure: Procedure(s):  right breast tag localized lumpectomy       Diagnosis: Malignant neoplasm of central portion of right breast in female, estrogen receptor positive (H) [C50.111, Z17.0]  Diagnosis Additional Information: No value filed.    Anesthesia Type:   MAC     Note:    Oropharynx: oropharynx clear of all foreign objects  Level of Consciousness: awake  Oxygen Supplementation: face mask  Level of Supplemental Oxygen (L/min / FiO2): 6  Independent Airway: airway patency satisfactory and stable  Dentition: dentition unchanged  Vital Signs Stable: post-procedure vital signs reviewed and stable  Report to RN Given: handoff report given  Patient transferred to: PACU    Handoff Report: Identifed the Patient, Identified the Reponsible Provider, Reviewed the pertinent medical history, Discussed the surgical course, Reviewed Intra-OP anesthesia mangement and issues during anesthesia, Set expectations for post-procedure period and Allowed opportunity for questions and acknowledgement of understanding      Electronically Signed By: ADRIANA Rogers CRNA  June 26, 2024  1:22 PM

## 2024-06-26 NOTE — PROGRESS NOTES
Shift: [id number]  POD#0: Right Breast Tag Localized Lumpectomy   Hx: MS    Orientation: AO x4  Pain: Declined interventions  Vitals/Tele: VSS RA  IV Access/drains: PIV infusing NS 75mL/hr   Diet: Advanced to Regular; Tolerating well; Good appetite   Mobility: Wheelchair bound at Baseline   GI/: Patient is requesting a Purewick for overnight   Wound/Skin: Surgical Incision; Scattered scrapes r/t pt's two cats  Consults: Surgery   Discharge Plan: Tomorrow  Additional info: Patient is remaining overnight d/t in home PCA's being unable to stay overnight. Patient's  has recently passed.       See Flow sheets for assessment

## 2024-06-26 NOTE — ANESTHESIA PREPROCEDURE EVALUATION
Anesthesia Pre-Procedure Evaluation    Patient: Mariela Allen   MRN: [id number] : 1950        Procedure : Procedure(s):  right breast tag localized lumpectomy          Past Medical History:   Diagnosis Date     Breast cancer (H)     Invasive ductal carcinoma of Left brst     Cancer (H)     Basel cell Skin cancer      Chronic pain      Clostridium difficile colitis      Hyperlipidemia      Hypertension      Invasive ductal carcinoma of breast, left (H)      Invasive ductal carcinoma of breast, right (H)      Mood disorder (H24)      MS (multiple sclerosis), secondary progressive, w/ tetraplegia, spasticity      Nerve pain      Neurogenic bladder      Nonmelanoma skin cancer      BRITTANY (obstructive sleep apnea)      Recurrent UTI       Past Surgical History:   Procedure Laterality Date     ANKLE SURGERY Right      BIOPSY NODE SENTINEL Left 08/15/2018    Procedure: BIOPSY NODE SENTINEL;;  Surgeon: Mariel Boucehr MD;  Location: [facility]     BLADDER SURGERY       BOTOX INJECTION TO BLADDER: x5       DISCECTOMY, FUSION CERVICAL ANTERIOR TWO LEVELS, COMBINED N/A 2023    Procedure: cervical 3 to cervical 5 anterior cervical discectomy and fusion;  Surgeon: Brady Nowak MD;  Location:  OR     LUMPECTOMY BREAST WITH SEED LOCALIZATION Left 08/15/2018    Procedure: LUMPECTOMY BREAST WITH SEED LOCALIZATION;  LEFT SEED LOCALIZED BREAST LUMPECTOMY WITH SENTINEL NODE BIOPSY ;  Surgeon: Mariel Boucher MD;  Location:  [facility]     MOHS MICROGRAPHIC PROCEDURE        Allergies   Allergen Reactions     Cats      Solu-Medrol [Methylprednisolone]      Higher doses cause DELIRIUM      Social History     Tobacco Use     Smoking status: Never     Smokeless tobacco: Never   Substance Use Topics     Alcohol use: Yes     Comment: drinks wine almost daily       Wt Readings from Last 1 Encounters:   24 63.5 kg (140 lb)        Anesthesia Evaluation   Pt has had prior anesthetic. Type: General.    No history of anesthetic  "complications       ROS/MED HX  ENT/Pulmonary:     (+) sleep apnea, uses CPAP,                                   (-) tobacco use, asthma and COPD   Neurologic:     (+)                   Multiple Sclerosis, limitations: tetraplegia, neurogenic bladder, spasticity.            Cardiovascular:     (+) Dyslipidemia hypertension- -   -  - -                                   (-) CAD   METS/Exercise Tolerance:     Hematologic:  - neg hematologic  ROS     Musculoskeletal:       GI/Hepatic:    (-) GERD and liver disease   Renal/Genitourinary:    (-) renal disease   Endo:    (-) Type I DM and Type II DM   Psychiatric/Substance Use:       Infectious Disease:       Malignancy:   (+) Malignancy, History of Breast.Breast CA Active status post.      Other:            Physical Exam    Airway        Mallampati: II   TM distance: > 3 FB   Neck ROM: full   Mouth opening: > 3 cm    Respiratory Devices and Support         Dental       (+) Minor Abnormalities - some fillings, tiny chips      Cardiovascular   cardiovascular exam normal          Pulmonary   pulmonary exam normal            OUTSIDE LABS:  CBC:   Lab Results   Component Value Date    WBC 7.1 01/19/2023    WBC 5.5 03/16/2021    HGB 12.4 01/19/2023    HGB 11.9 03/16/2021    HCT 37.3 01/19/2023    HCT 36.6 03/16/2021     01/19/2023     03/19/2021     BMP:   Lab Results   Component Value Date     01/19/2023     03/17/2021    POTASSIUM 4.3 01/19/2023    POTASSIUM 3.7 03/20/2021    CHLORIDE 105 01/19/2023    CHLORIDE 103 03/17/2021    CO2 29 01/19/2023    CO2 26 03/17/2021    BUN 8.1 01/19/2023    BUN 13 03/17/2021    CR 0.7 05/21/2024    CR 0.61 01/19/2023     (H) 01/19/2023     (H) 01/19/2023     COAGS:   Lab Results   Component Value Date    INR 0.92 03/15/2021     POC: No results found for: \"BGM\", \"HCG\", \"HCGS\"  HEPATIC:   Lab Results   Component Value Date    ALBUMIN 3.5 01/19/2023    PROTTOTAL 6.1 (L) 01/19/2023    ALT 18 01/19/2023    " "AST 33 01/19/2023    ALKPHOS 73 01/19/2023    BILITOTAL 0.4 01/19/2023    TAVO <10 (L) 08/17/2020     OTHER:   Lab Results   Component Value Date    LACT 1.0 01/18/2023    KIMBER 9.3 01/19/2023    PHOS 4.4 03/19/2021    MAG 1.6 03/19/2021       Anesthesia Plan    ASA Status:  3    NPO Status:  NPO Appropriate    Anesthesia Type: MAC.     - Reason for MAC: straight local not clinically adequate              Consents    Anesthesia Plan(s) and associated risks, benefits, and realistic alternatives discussed. Questions answered and patient/representative(s) expressed understanding.     - Discussed:     - Discussed with:  Patient      - Extended Intubation/Ventilatory Support Discussed: Yes.           Postoperative Care    Pain management: Multi-modal analgesia.   PONV prophylaxis: Ondansetron (or other 5HT-3)     Comments:               Shaji Rao MD    I have reviewed the pertinent notes and labs in the chart from the past 30 days and (re)examined the patient.  Any updates or changes from those notes are reflected in this note.              # Overweight: Estimated body mass index is 25.61 kg/m  as calculated from the following:    Height as of 5/31/24: 1.575 m (5' 2\").    Weight as of 5/31/24: 63.5 kg (140 lb).      "

## 2024-06-26 NOTE — OP NOTE
[facility] Breast Surgery Operative Note      Pre-operative diagnosis: Right breast invasive ductal carcinoma   Post-operative diagnosis: Same, pathology pending     Procedure: 1.  RIGHT BREAST RFID TAG LOCALIZED PARTIAL MASTECTOMY         Surgeon: Mariel Boucher MD   Assistant(s):  Nicho Veliz PA-C  The PA s assistance was medically necessary to provide adequate exposure in the operating field, maintain hemostasis, cutting suture, clamping and ligating bleeding vessels, and visualization of anatomic structures throughout the surgical procedure.      Anesthesia: Local with MAC    Estimated blood loss:   5 cc     Specimens: ID Type Source Tests Collected by Time Destination   1 : RIGHT BREAST TAG LOCALIZED LUMPECTOMY Tissue Breast, Right SURGICAL PATHOLOGY EXAM Mariel Boucher MD 6/26/2024 12:46 PM         INDICATION:  Please see my clinic note for details  DESCRIPTION OF PROCEDURE: The patient was placed on the table in supine position. Conscious sedation was induced. Perioperative antibiotics were given.  The right breast and axilla were prepped and draped in standard sterile fashion.  We used the radiofrequency localizer tag placed in the Breast Center as well as the post-tag mammograms to localize the area of interest. Local anesthetic was injected along the marked line for the incision. We made a periareolar incision centered at the 2 o'clock position. We created skin flaps along the anterior mammary fascial plane circumferentially using cautery. A suture was placed over the highest signal with the probe to guide circumferential dissection. We then carried the dissection down using electrocautery into the breast tissue and excised the area of interest, including the tag.  The localizer probe was used to guide the dissection. The area of concerning breast tissue was removed in its entirety with some surrounding benign appearing breast tissue. The posterior margin was including the pectoral fascia. After the  specimen was removed it had a high signal with the localizer probe.  Once the mass was removed, it was oriented with Osborne dyes. A specimen mammogram was obtained and revealed the clip, tag and mass in the center. The specimen was then sent to pathology for review. Pathology reported all margins were negative.  The wound was then examined for bleeding and hemostasis was achieved using electrocautery.      Clips were placed at the 12, 3, 6, and 9 o'clock positions of the lumpectomy cavity as well as posterior.  The lumpectomy cavity was reapproximated with several interrupted 2-0 vicryl sutures. The skin was closed with a deep dermal 3-0 vicryl and running 4-0 Monocryl subcuticular suture and steri strips.   The patient tolerated the procedure well.  Sponge and instrument counts were correct.  Mariel Boucher MD  [facility]  [phone]

## 2024-06-27 VITALS
SYSTOLIC BLOOD PRESSURE: 134 MMHG | RESPIRATION RATE: 16 BRPM | HEIGHT: 62 IN | TEMPERATURE: 98.1 F | HEART RATE: 71 BPM | DIASTOLIC BLOOD PRESSURE: 75 MMHG | WEIGHT: 143.8 LBS | OXYGEN SATURATION: 94 % | BODY MASS INDEX: 26.46 KG/M2

## 2024-06-27 LAB
CREAT SERPL-MCNC: 0.57 MG/DL (ref 0.51–0.95)
EGFRCR SERPLBLD CKD-EPI 2021: >90 ML/MIN/1.73M2

## 2024-06-27 PROCEDURE — 250N000013 HC RX MED GY IP 250 OP 250 PS 637: Performed by: PHYSICIAN ASSISTANT

## 2024-06-27 PROCEDURE — 96361 HYDRATE IV INFUSION ADD-ON: CPT

## 2024-06-27 PROCEDURE — 36415 COLL VENOUS BLD VENIPUNCTURE: CPT | Performed by: SURGERY

## 2024-06-27 PROCEDURE — 82565 ASSAY OF CREATININE: CPT | Performed by: SURGERY

## 2024-06-27 RX ADMIN — ANASTROZOLE 1 MG: 1 TABLET, COATED ORAL at 09:14

## 2024-06-27 RX ADMIN — FAMOTIDINE 20 MG: 20 TABLET, FILM COATED ORAL at 09:14

## 2024-06-27 RX ADMIN — BACLOFEN 30 MG: 10 TABLET ORAL at 13:15

## 2024-06-27 RX ADMIN — OXYCODONE HYDROCHLORIDE 5 MG: 5 TABLET ORAL at 07:04

## 2024-06-27 RX ADMIN — BACLOFEN 30 MG: 10 TABLET ORAL at 09:14

## 2024-06-27 ASSESSMENT — ACTIVITIES OF DAILY LIVING (ADL)
ADLS_ACUITY_SCORE: 43
ADLS_ACUITY_SCORE: 38
ADLS_ACUITY_SCORE: 43
ADLS_ACUITY_SCORE: 38
ADLS_ACUITY_SCORE: 43
ADLS_ACUITY_SCORE: 38
ADLS_ACUITY_SCORE: 43
ADLS_ACUITY_SCORE: 38
ADLS_ACUITY_SCORE: 43
ADLS_ACUITY_SCORE: 43

## 2024-06-27 NOTE — PROGRESS NOTES
Slept well overnight. Minimal pain - Oxy given during evening. A&Ox4. VSS on room air. Tolerating regular diet. IV SL. Voiding, incontinent of urine. Wheelchair bound at baseline. Pt states [facility] is scheduled to arrive here at 1400 to bring patient home.

## 2024-06-27 NOTE — DISCHARGE INSTRUCTIONS
[facility]  Discharge Instructions: Post-Operative Breast Surgery    ACTIVITY  Take frequent short walks and increase your activity gradually.    Avoid strenuous physical activity or heavy lifting greater than 15-20 lbs. for 1-2 weeks with arm on the surgery side.  You may climb stairs.  Gentle rotation and stretching of your arms and shoulders will prevent joint stiffness.  You may drive without restrictions when you are not using any prescription pain medication and feel comfortable in a car.  You may return to work/school when you are comfortable without any prescription pain medication.    WOUND CARE  You may remove your ACE wrap/dressing and shower 48 hours after the surgery.  Pat your incisions dry and leave them open to air.  Re-apply dressing (Band-Aids or gauze/tape) as needed for drainage.  You may have steri-strips (looks like white tape) or skin glue on your incisions.  You may peel off the steri-strips 2 weeks after your surgery if they have not peeled off on their own.  If you have skin glue, it will peel up and fall off on its own.  Do not soak your incisions in a tub or pool for 2 weeks.   Do not apply any lotions, creams, or ointments to your incisions.  A ridge under your incisions is normal and will gradually resolve.  Wear a supportive bra for 1-2 weeks, day and night.    DIET  Start with liquids, then gradually resume your regular diet as tolerated.   Drink plenty of liquids to stay hydrated.    PAIN  Expect some tenderness and discomfort at the incision site(s).  Use the prescribed pain medication at your discretion.  Expect gradual resolution of your pain over several days.  You may take ibuprofen with food (unless you have been told not to) or acetaminophen/Tylenol instead of or in addition to your prescribed pain medication.  If you are taking Norco or Percocet, do not take any additional acetaminophen/Tylenol.  Do not drink alcohol or drive while you are taking  pain medications.    EXPECTATIONS  Pain medications can cause constipation.  Limit use when possible.  Take over the counter stool softener/stimulant, such as Colace or Senna, 1-2 times a day with plenty of water.  You may take a mild over the counter laxative, such as Miralax or a suppository, as needed.    You may discontinue these medications once you are having regular bowel movements and/or are no longer taking your narcotic pain medication.    RETURN APPOINTMENT  Follow up with your surgeon in 2-4 weeks.  Please call the office at [phone] to schedule your appointment.      CALL OUR OFFICE [phone] IF YOU HAVE:   Chills or fever above 101 F.  Increased redness, warmth, or drainage at your incisions.  Significant bleeding.  Pain not relieved by your pain medication or rest.  Increasing pain after the first 48 hours.  Any other concerns or questions.

## 2024-06-27 NOTE — PLAN OF CARE
Patient discharging home via [employer]. Pt has PCA's at home to help with care. AVS gone over with patient in room and prescriptions given to patient. All Questions answered.     Analilia Oliva RN

## 2024-06-27 NOTE — PROGRESS NOTES
Surgery    No complaints  Denies pain  Awaiting bowel function   Tolerating diet  Wheelchair bound.   Denies CP, dyspnea    Gen:  Awake, Alert, NAD, pleasant and conversational  /75 (BP Location: Right arm)   Pulse 71   Temp 98.1  F (36.7  C) (Oral)   Resp 16    Resp - Non-labored  Chest - Right breast dressings clean and dry. No erythema or ecchymosis. Gentle compression with ACE wrap.    A/P s/p Right breast RFID tag localized partial mastectomy on 6/26/24.    - diet as tolerated  - encourage incentive spirometer use  - dispo: home as scheduled. Transport available at 2p today.  - instructed  - follow up with Dr. Boucher in about 2 weeks.    Nicho Veliz PA-C  Office: [phone]  Pager: [phone]

## 2024-06-27 NOTE — PLAN OF CARE
Goal Outcome Evaluation:      Plan of Care Reviewed With: patient    Overall Patient Progress: improvingOverall Patient Progress: improving         POD#0: Right Breast Tag Localized Lumpectomy   Hx: MS     Orientation: AO x4  Pain: Declined interventions  Vitals/Tele: VSS RA  IV Access/drains: PIV infusing NS 75mL/hr   Diet: Advanced to Regular; Tolerating well; Good appetite   Mobility: Wheelchair bound at Baseline   GI/: Patient is requesting a Purewick for overnight   Wound/Skin: Surgical Incision; Scattered scrapes r/t pt's two cats  Consults: Surgery   Discharge Plan: Tomorrow  Additional info: Patient is remaining overnight d/t in home PCA's being unable to stay overnight. Patient's  has recently passed. Incisional pain, Oxycodone givenx1

## 2024-06-28 ENCOUNTER — TELEPHONE (OUTPATIENT)
Dept: SURGERY | Facility: CLINIC | Age: 74
End: 2024-06-28
Payer: COMMERCIAL

## 2024-06-28 LAB
PATH REPORT.COMMENTS IMP SPEC: ABNORMAL
PATH REPORT.COMMENTS IMP SPEC: YES
PATH REPORT.FINAL DX SPEC: ABNORMAL
PATH REPORT.GROSS SPEC: ABNORMAL
PATH REPORT.INTRAOP OBS SPEC DOC: ABNORMAL
PATH REPORT.MICROSCOPIC SPEC OTHER STN: ABNORMAL
PATH REPORT.RELEVANT HX SPEC: ABNORMAL
PATHOLOGY SYNOPTIC REPORT: ABNORMAL
PHOTO IMAGE: ABNORMAL

## 2024-06-28 NOTE — TELEPHONE ENCOUNTER
Breast Nurse Care Coordination Post Op Call.     Mariela is s/p right breast lumpectomy on 6/26/2024 with Dr. Boucher at the [facility]. Pathology results are still pending.    I left a message and informed patient to call me with any questions or concerns.     Mehreen Bartlett, RN, BSN, PHN  Breast Care Nurse Coordinator  [facility] [place]  [phone]

## 2024-07-01 NOTE — TELEPHONE ENCOUNTER
No radiation needed per Dr. Boucher. Mariela is scheduled with Dr. Carrera (medical oncology) on 7/3/24 to discuss after surgery care.     Mehreen Bartlett, RN, BSN, PHN  Breast Care Nurse Coordinator  [facility] [place] [place]  [phone]

## 2024-07-03 ENCOUNTER — TRANSFERRED RECORDS (OUTPATIENT)
Dept: HEALTH INFORMATION MANAGEMENT | Facility: CLINIC | Age: 74
End: 2024-07-03
Payer: COMMERCIAL

## 2024-07-23 ENCOUNTER — OFFICE VISIT (OUTPATIENT)
Dept: SURGERY | Facility: CLINIC | Age: 74
End: 2024-07-23
Payer: COMMERCIAL

## 2024-07-23 DIAGNOSIS — Z09 SURGERY FOLLOW-UP EXAMINATION: Primary | ICD-10-CM

## 2024-07-23 PROCEDURE — 99024 POSTOP FOLLOW-UP VISIT: CPT | Performed by: SURGERY

## 2024-07-23 NOTE — LETTER
July 23, 2024          Apurva Jackson PA-C  [address] [facility] [address]  [place] [address]      RE:   Mariela Allen 1950      Dear Colleague,    Thank you for referring your patient, Mariela Allen, to [facility]. Please see a copy of my visit note below.    Sherri Daiely reports she is doing well.  She denies any issues with breast pain.     Breasts: Right breast periareolar incision is healing well.  No erythema or induration.  Steri-Strips were removed.     Pathology: Reviewed and copy given to patient     A/P  Mariela Allen is recovering from a tag localized right breast lumpectomy on 6/26/2024.  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  All margins were negative.  As above she is healing well.  No areas of concern.  She has seen Dr. Machuca for postoperative follow-up and will continue to follow with him.  I agree there is no strong indication for adjuvant radiation and that we could follow her with imaging.  Plan for right breast mammogram in 6 months and bilateral screening mammogram at 1 year.    Again, thank you for allowing me to participate in the care of your patient.      Sincerely,      Mariel Boucher MD

## 2024-07-23 NOTE — PROGRESS NOTES
[facility] Breast Surgery Postoperative Note    S: Sherri Dailey reports she is doing well.  She denies any issues with breast pain.    Breasts: Right breast periareolar incision is healing well.  No erythema or induration.  Steri-Strips were removed.    Pathology: Reviewed and copy given to patient    A/P  Mariela Allen is recovering from a tag localized right breast lumpectomy on 6/26/2024.  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  All margins were negative.  As above she is healing well.  No areas of concern.  She has seen Dr. Machuca for postoperative follow-up and will continue to follow with him.  I agree there is no strong indication for adjuvant radiation and that we could follow her with imaging.  Plan for right breast mammogram in 6 months and bilateral screening mammogram at 1 year.    Thank you for the opportunity to help in her care.    Mariel Boucher MD  [facility]  [phone]    Please route or send letter to:  Primary Care Provider (PCP) and Referring Provider

## 2025-01-03 ENCOUNTER — TRANSFERRED RECORDS (OUTPATIENT)
Dept: HEALTH INFORMATION MANAGEMENT | Facility: CLINIC | Age: 75
End: 2025-01-03
Payer: COMMERCIAL

## 2025-03-26 ENCOUNTER — MEDICAL CORRESPONDENCE (OUTPATIENT)
Dept: HEALTH INFORMATION MANAGEMENT | Facility: CLINIC | Age: 75
End: 2025-03-26
Payer: COMMERCIAL

## 2025-03-26 ENCOUNTER — TRANSCRIBE ORDERS (OUTPATIENT)
Dept: OTHER | Age: 75
End: 2025-03-26

## 2025-03-26 ENCOUNTER — TRANSFERRED RECORDS (OUTPATIENT)
Dept: HEALTH INFORMATION MANAGEMENT | Facility: CLINIC | Age: 75
End: 2025-03-26
Payer: COMMERCIAL

## 2025-03-26 DIAGNOSIS — H54.7 DECREASED VISION: Primary | ICD-10-CM

## 2025-05-05 ENCOUNTER — HOSPITAL ENCOUNTER (OUTPATIENT)
Dept: MAMMOGRAPHY | Facility: CLINIC | Age: 75
Discharge: HOME | End: 2025-05-05
Attending: INTERNAL MEDICINE | Admitting: INTERNAL MEDICINE
Payer: COMMERCIAL

## 2025-05-05 DIAGNOSIS — Z12.31 SCREENING MAMMOGRAM FOR BREAST CANCER: ICD-10-CM

## 2025-05-05 PROCEDURE — 77063 BREAST TOMOSYNTHESIS BI: CPT

## 2025-05-09 ENCOUNTER — TRANSFERRED RECORDS (OUTPATIENT)
Dept: HEALTH INFORMATION MANAGEMENT | Facility: CLINIC | Age: 75
End: 2025-05-09
Payer: COMMERCIAL

## 2025-06-17 DIAGNOSIS — H25.813 COMBINED FORM OF SENILE CATARACT OF BOTH EYES: Primary | ICD-10-CM

## 2025-06-17 DIAGNOSIS — H43.393 VITREOUS SYNERESIS OF BOTH EYES: ICD-10-CM

## 2025-06-18 ENCOUNTER — OFFICE VISIT (OUTPATIENT)
Dept: OPHTHALMOLOGY | Facility: CLINIC | Age: 75
End: 2025-06-18
Attending: OPHTHALMOLOGY
Payer: COMMERCIAL

## 2025-06-18 DIAGNOSIS — H52.4 MYOPIA OF BOTH EYES WITH ASTIGMATISM AND PRESBYOPIA: ICD-10-CM

## 2025-06-18 DIAGNOSIS — H25.813 COMBINED FORM OF SENILE CATARACT OF BOTH EYES: Primary | ICD-10-CM

## 2025-06-18 DIAGNOSIS — H43.393 VITREOUS SYNERESIS OF BOTH EYES: ICD-10-CM

## 2025-06-18 DIAGNOSIS — H52.13 MYOPIA OF BOTH EYES WITH ASTIGMATISM AND PRESBYOPIA: ICD-10-CM

## 2025-06-18 DIAGNOSIS — H52.203 MYOPIA OF BOTH EYES WITH ASTIGMATISM AND PRESBYOPIA: ICD-10-CM

## 2025-06-18 PROCEDURE — 99204 OFFICE O/P NEW MOD 45 MIN: CPT | Mod: GC | Performed by: OPHTHALMOLOGY

## 2025-06-18 PROCEDURE — 76511 OPH US DX QUAN A-SCAN ONLY: CPT | Performed by: OPHTHALMOLOGY

## 2025-06-18 PROCEDURE — G0463 HOSPITAL OUTPT CLINIC VISIT: HCPCS | Performed by: OPHTHALMOLOGY

## 2025-06-18 ASSESSMENT — TONOMETRY
IOP_METHOD: ICARE
OD_IOP_MMHG: 8
OS_IOP_MMHG: 8

## 2025-06-18 ASSESSMENT — REFRACTION_MANIFEST
OS_AXIS: 080
OS_ADD: +2.25
OD_CYLINDER: +1.00
OD_SPHERE: -0.75
OS_CYLINDER: +0.25
OD_ADD: +2.25
OS_SPHERE: -0.75
OD_AXIS: 062

## 2025-06-18 ASSESSMENT — KERATOMETRY
OS_AXISANGLE_DEGREES: 101
OD_K1POWER_DIOPTERS: 46.75
OD_K2POWER_DIOPTERS: 47.62
OD_AXISANGLE2_DEGREES: 166
OS_K2POWER_DIOPTERS: 47.62
OD_AXISANGLE_DEGREES: 76
OS_K1POWER_DIOPTERS: 47
OS_AXISANGLE2_DEGREES: 11

## 2025-06-18 ASSESSMENT — VISUAL ACUITY
OD_SC: 20/40
OS_SC: 20/100
METHOD_MR: N/C DIAGNOSTIC MR
OS_SC+: -1
METHOD: SNELLEN - LINEAR
OS_PH_SC: 20/50
OD_SC+: -2

## 2025-06-18 ASSESSMENT — SLIT LAMP EXAM - LIDS
COMMENTS: NORMAL
COMMENTS: NORMAL

## 2025-06-18 ASSESSMENT — CONF VISUAL FIELD
METHOD: COUNTING FINGERS
OS_SUPERIOR_TEMPORAL_RESTRICTION: 0
OS_SUPERIOR_NASAL_RESTRICTION: 0
OD_SUPERIOR_NASAL_RESTRICTION: 0
OD_NORMAL: 1
OS_INFERIOR_TEMPORAL_RESTRICTION: 0
OD_INFERIOR_TEMPORAL_RESTRICTION: 0
OD_SUPERIOR_TEMPORAL_RESTRICTION: 0
OD_INFERIOR_NASAL_RESTRICTION: 0
OS_INFERIOR_NASAL_RESTRICTION: 0
OS_NORMAL: 1

## 2025-06-18 ASSESSMENT — CUP TO DISC RATIO
OD_RATIO: 0.2
OS_RATIO: 0.2

## 2025-06-18 ASSESSMENT — REFRACTION_WEARINGRX: SPECS_TYPE: OTC READERS

## 2025-06-18 NOTE — NURSING NOTE
"Chief Complaints and History of Present Illnesses   Patient presents with    Cataract     Referral from Dr. Misha Morales for evaluation of cataract each eye.     Patient states Dr. Morales did the whole evaluation, but he was unable to schedule her for cataract surgery since they don't have a lift. \"My left eye is worse.\" Denies floaters or flashes. Denies eye pain or dryness. \"I used to wear glasses for driving, but I don't drive anymore.\" Denies eye surgery. Denies eye trauma.     Kita Woods, COT 1:10 PM 06/18/2025       Chief Complaint(s) and History of Present Illness(es)       Cataract              Laterality: both eyes    Associated symptoms: blurred vision, glare, haloes (especially from lights in bathroom) and a need for brighter lights (sometimes on her phone).  Negative for starburst and double vision    Severity: moderate    Onset: gradual    Duration: months    Frequency: constant    Course: gradually worsening    Treatments tried: glasses    Response to treatment: no improvement    Comments: Referral from Dr. Misha Morales for evaluation of cataract each eye.     Patient states Dr. Morales did the whole evaluation, but he was unable to schedule her for cataract surgery since they don't have a lift. \"My left eye is worse.\" Denies floaters or flashes. Denies eye pain or dryness. \"I used to wear glasses for driving, but I don't drive anymore.\" Denies eye surgery. Denies eye trauma.     Kita Woods, COT 1:10 PM 06/18/2025                      "

## 2025-06-23 ENCOUNTER — TELEPHONE (OUTPATIENT)
Dept: OPHTHALMOLOGY | Facility: CLINIC | Age: 75
End: 2025-06-23
Payer: COMMERCIAL

## 2025-06-23 NOTE — TELEPHONE ENCOUNTER
Called patient to schedule surgery with Dr. Blair    Spoke with: Mariela    Date(s) of Surgery: 8/8/25 and 8/15/25 providers calendar     Patient aware of approximate arrival time: Yes at pre op nursing will call      Tissue: Not Applicable Ordered: Not Applicable     Location of surgery: [facility]     Pre-Op H&P: [facility]      Informed patient that they need to call to schedule pre-op H&P within 30 days of surgery date: Yes    Post-Op Appt Dates:   NA provider calendar not updated      Discussed with patient pre-op RN will call 2-3 days prior to surgery with arrival time and instructions:  Yes       Standard Surgery Packet Sent: Yes 06/23/25  via Mail - Standard      Additional Information Sent in Packet:       Informed patient that they will need an adult  to bring patient home from surgery: Yes  : medical cab          Additional Comments:        All patients questions were answered and was instructed to review surgical packet and call back [phone] with any questions or concerns.       Corinna Barkley on 6/23/2025 at 12:31 PM

## 2025-08-11 ENCOUNTER — OFFICE VISIT (OUTPATIENT)
Dept: OPHTHALMOLOGY | Facility: CLINIC | Age: 75
End: 2025-08-11
Payer: COMMERCIAL

## 2025-08-11 DIAGNOSIS — H25.813 COMBINED FORM OF SENILE CATARACT OF BOTH EYES: Primary | ICD-10-CM

## 2025-08-11 DIAGNOSIS — H25.811 COMBINED FORM OF AGE-RELATED CATARACT, RIGHT EYE: ICD-10-CM

## 2025-08-11 PROCEDURE — 99024 POSTOP FOLLOW-UP VISIT: CPT | Performed by: OPHTHALMOLOGY

## 2025-08-11 RX ORDER — MOXIFLOXACIN 5 MG/ML
1 SOLUTION/ DROPS OPHTHALMIC 4 TIMES DAILY
Qty: 3 ML | Refills: 0 | Status: SHIPPED | OUTPATIENT
Start: 2025-08-11

## 2025-08-11 RX ORDER — PREDNISOLONE ACETATE 10 MG/ML
1-2 SUSPENSION/ DROPS OPHTHALMIC 4 TIMES DAILY
Qty: 10 ML | Refills: 0 | Status: SHIPPED | OUTPATIENT
Start: 2025-08-11

## 2025-08-11 RX ORDER — KETOROLAC TROMETHAMINE 5 MG/ML
1 SOLUTION OPHTHALMIC 4 TIMES DAILY
Qty: 10 ML | Refills: 0 | Status: SHIPPED | OUTPATIENT
Start: 2025-08-11

## 2025-08-11 ASSESSMENT — SLIT LAMP EXAM - LIDS
COMMENTS: NORMAL
COMMENTS: NORMAL

## 2025-08-11 ASSESSMENT — VISUAL ACUITY
OS_SC: 20/20
METHOD: SNELLEN - LINEAR
OD_SC+: -1/+2
OD_SC: 20/40

## 2025-08-11 ASSESSMENT — TONOMETRY
OS_IOP_MMHG: 8
OD_IOP_MMHG: 10
IOP_METHOD: TONOPEN

## 2025-08-19 ENCOUNTER — OFFICE VISIT (OUTPATIENT)
Dept: OPHTHALMOLOGY | Facility: CLINIC | Age: 75
End: 2025-08-19
Payer: COMMERCIAL

## 2025-08-19 DIAGNOSIS — Z96.1 PSEUDOPHAKIA, BOTH EYES: Primary | ICD-10-CM

## 2025-08-19 PROCEDURE — 99024 POSTOP FOLLOW-UP VISIT: CPT | Performed by: OPHTHALMOLOGY

## 2025-08-19 ASSESSMENT — TONOMETRY
OD_IOP_MMHG: 12
OS_IOP_MMHG: 11
IOP_METHOD: TONOPEN

## 2025-08-19 ASSESSMENT — VISUAL ACUITY
OD_SC+: -1
METHOD: SNELLEN - LINEAR
OD_SC: 20/30
OS_SC+: -1
OS_SC: 20/30

## 2025-08-19 ASSESSMENT — SLIT LAMP EXAM - LIDS
COMMENTS: NORMAL
COMMENTS: NORMAL

## (undated) DEVICE — STRAP POSITIONING VELCRO 13' CERVICAL HARNESS 920877

## (undated) DEVICE — SOL WATER IRRIG 1000ML BOTTLE 2F7114

## (undated) DEVICE — TOOL DISSECT MIDAS MR8 12CM SP MATCH SYM-TRI MR8-SP12MH30T

## (undated) DEVICE — GLOVE PROTEXIS MICRO 6.0  2D73PM60

## (undated) DEVICE — CLIP ETHICON LIGACLIP SM BLUE LT100

## (undated) DEVICE — DECANTER VIAL 2006S

## (undated) DEVICE — GLOVE PROTEXIS BLUE W/NEU-THERA 6.5  2D73EB65

## (undated) DEVICE — ESU ELEC BLADE 2.75" COATED/INSULATED E1455

## (undated) DEVICE — SU VICRYL 4-0 PS-2 18" UND J496H

## (undated) DEVICE — ESU PENCIL W/SMOKE EVAC NEPTUNE STRYKER 0703-046-000

## (undated) DEVICE — SPONGE KITTNER 31001010

## (undated) DEVICE — GOWN IMPERVIOUS BREATHABLE SMART LG 89015

## (undated) DEVICE — GLOVE BIOGEL PI MICRO SZ 6.0 48560

## (undated) DEVICE — SU VICRYL 3-0 SH 27" J316H

## (undated) DEVICE — SET BREAST BIOPSY LOCALIZER 20 PROBE 8MM PENCIL 09-0006

## (undated) DEVICE — SUCTION CANISTER MEDIVAC LINER 3000ML W/LID 65651-530

## (undated) DEVICE — MANIFOLD NEPTUNE 4 PORT 700-20

## (undated) DEVICE — SU MONOCRYL 4-0 PS-2 18" UND Y496G

## (undated) DEVICE — SPONGE COTTONOID 1/2X3" 80-1407

## (undated) DEVICE — DRSG STERI STRIP 1/2X4" R1547

## (undated) DEVICE — SPONGE SURGIFOAM 100 1974

## (undated) DEVICE — DRSG DRAIN 4X4" 7086

## (undated) DEVICE — PREP CHLORAPREP 26ML TINTED HI-LITE ORANGE 930815

## (undated) DEVICE — DRSG STERI STRIP 1/4X3" R1541

## (undated) DEVICE — TUBING SUCTION SOFT 20'X3/16" 0036570

## (undated) DEVICE — DRAPE MICROSCOPE LEICA 54X150" AR8033650

## (undated) DEVICE — DRAPE BREAST/CHEST 29420

## (undated) DEVICE — ACP TEMPORARY FIXATION PIN

## (undated) DEVICE — IOM SUPPLIES

## (undated) DEVICE — LINEN TOWEL PACK X5 5464

## (undated) DEVICE — SYR EAR BULB 3OZ 0035830

## (undated) DEVICE — ESU GROUND PAD UNIVERSAL W/O CORD

## (undated) DEVICE — PREP CHLORAPREP W/ORANGE TINT 10.5ML 260715

## (undated) DEVICE — SPONGE KITTNER 30-101

## (undated) DEVICE — DRAPE MAYO STAND 23X54 8337

## (undated) DEVICE — SYR BULB IRRIG 50ML LATEX FREE 0035280

## (undated) DEVICE — SLEEVE PROTECTIVE BREAST BIOPSY  GMSLV001-10

## (undated) DEVICE — GLOVE BIOGEL PI ULTRATOUCH SZ 6.5 41165

## (undated) DEVICE — SU VICRYL 2-0 SH 27" J317H

## (undated) DEVICE — SU VICRYL 3-0 SH 27" UND J416H

## (undated) DEVICE — SUCTION FRAZIER 12FR W/OBTURATOR 33120

## (undated) DEVICE — MARKER MARGIN MARKER STD 6 COLOR SGL USE MMS6

## (undated) DEVICE — Device

## (undated) DEVICE — GLOVE BIOGEL PI MICRO INDICATOR UNDERGLOVE SZ 6.5 48965

## (undated) DEVICE — NDL SPINAL 18GA 3.5" 405184

## (undated) DEVICE — BLADE KNIFE SURG 15 371115

## (undated) DEVICE — PACK MINOR SBA15MIFSE

## (undated) DEVICE — RX SURGIFLO HEMOSTATIC MATRIX W/THROMBIN 8ML 2994

## (undated) DEVICE — SYR 10ML FINGER CONTROL W/O NDL 309695

## (undated) DEVICE — DRSG TEGADERM 2 1/2X 2 3/4"

## (undated) DEVICE — DRAPE C-ARMOR 5 SIDED 5523

## (undated) DEVICE — PAD CHUX UNDERPAD 23X24" 7136

## (undated) DEVICE — SU SILK 2-0 FSL 18" 677G

## (undated) DEVICE — NDL 25GA 1.5" 305127

## (undated) DEVICE — PIN DISTRACTION ANCHOR FOR SCR 14MM MDS9091414

## (undated) DEVICE — IMM COLLAR CERVICAL MED UNIVERSAL 3X24" 79-83500

## (undated) DEVICE — PREP CHLORAPREP 26ML TINTED ORANGE  260815

## (undated) DEVICE — PACK SPINE SM CUSTOM SNE15SSFSK

## (undated) DEVICE — GLOVE BIOGEL PI MICRO INDICATOR UNDERGLOVE SZ 7.0 48970

## (undated) DEVICE — IONM UP TO 7 HOURS

## (undated) DEVICE — DRAPE STERI TOWEL LG 1010

## (undated) DEVICE — NDL 19GA 1.5"

## (undated) DEVICE — TAPE DURAPORE 3" SILK 1538-3

## (undated) DEVICE — DECANTER BAG 2002S

## (undated) DEVICE — DRAPE COVER C-ARM SEAMLESS SNAP-KAP 03-KP26 LATEX FREE

## (undated) DEVICE — SPONGE RAY-TEC 4X8" 7318

## (undated) RX ORDER — CEFAZOLIN SODIUM/WATER 2 G/20 ML
SYRINGE (ML) INTRAVENOUS
Status: DISPENSED
Start: 2024-06-26

## (undated) RX ORDER — ONDANSETRON 2 MG/ML
INJECTION INTRAMUSCULAR; INTRAVENOUS
Status: DISPENSED
Start: 2024-06-26

## (undated) RX ORDER — DEXAMETHASONE SODIUM PHOSPHATE 4 MG/ML
INJECTION, SOLUTION INTRA-ARTICULAR; INTRALESIONAL; INTRAMUSCULAR; INTRAVENOUS; SOFT TISSUE
Status: DISPENSED
Start: 2018-08-15

## (undated) RX ORDER — ONDANSETRON 2 MG/ML
INJECTION INTRAMUSCULAR; INTRAVENOUS
Status: DISPENSED
Start: 2023-01-18

## (undated) RX ORDER — PROPOFOL 10 MG/ML
INJECTION, EMULSION INTRAVENOUS
Status: DISPENSED
Start: 2023-01-18

## (undated) RX ORDER — PROPOFOL 10 MG/ML
INJECTION, EMULSION INTRAVENOUS
Status: DISPENSED
Start: 2018-08-15

## (undated) RX ORDER — HYDROMORPHONE HYDROCHLORIDE 1 MG/ML
INJECTION, SOLUTION INTRAMUSCULAR; INTRAVENOUS; SUBCUTANEOUS
Status: DISPENSED
Start: 2023-01-18

## (undated) RX ORDER — REMIFENTANIL HYDROCHLORIDE 1 MG/ML
INJECTION, POWDER, LYOPHILIZED, FOR SOLUTION INTRAVENOUS
Status: DISPENSED
Start: 2023-01-18

## (undated) RX ORDER — DEXAMETHASONE SODIUM PHOSPHATE 4 MG/ML
INJECTION, SOLUTION INTRA-ARTICULAR; INTRALESIONAL; INTRAMUSCULAR; INTRAVENOUS; SOFT TISSUE
Status: DISPENSED
Start: 2024-06-26

## (undated) RX ORDER — LIDOCAINE HYDROCHLORIDE 20 MG/ML
INJECTION, SOLUTION EPIDURAL; INFILTRATION; INTRACAUDAL; PERINEURAL
Status: DISPENSED
Start: 2018-08-15

## (undated) RX ORDER — FENTANYL CITRATE 50 UG/ML
INJECTION, SOLUTION INTRAMUSCULAR; INTRAVENOUS
Status: DISPENSED
Start: 2024-06-26

## (undated) RX ORDER — CEFAZOLIN SODIUM 2 G/100ML
INJECTION, SOLUTION INTRAVENOUS
Status: DISPENSED
Start: 2018-08-15

## (undated) RX ORDER — ONDANSETRON 2 MG/ML
INJECTION INTRAMUSCULAR; INTRAVENOUS
Status: DISPENSED
Start: 2018-08-15

## (undated) RX ORDER — HYDROCODONE BITARTRATE AND ACETAMINOPHEN 5; 325 MG/1; MG/1
TABLET ORAL
Status: DISPENSED
Start: 2018-08-15

## (undated) RX ORDER — FENTANYL CITRATE 50 UG/ML
INJECTION, SOLUTION INTRAMUSCULAR; INTRAVENOUS
Status: DISPENSED
Start: 2018-08-15

## (undated) RX ORDER — DEXAMETHASONE SODIUM PHOSPHATE 4 MG/ML
INJECTION, SOLUTION INTRA-ARTICULAR; INTRALESIONAL; INTRAMUSCULAR; INTRAVENOUS; SOFT TISSUE
Status: DISPENSED
Start: 2023-01-18

## (undated) RX ORDER — GABAPENTIN 100 MG/1
CAPSULE ORAL
Status: DISPENSED
Start: 2023-01-18

## (undated) RX ORDER — FENTANYL CITRATE 0.05 MG/ML
INJECTION, SOLUTION INTRAMUSCULAR; INTRAVENOUS
Status: DISPENSED
Start: 2023-01-18

## (undated) RX ORDER — FENTANYL CITRATE 50 UG/ML
INJECTION, SOLUTION INTRAMUSCULAR; INTRAVENOUS
Status: DISPENSED
Start: 2023-01-18

## (undated) RX ORDER — GLYCOPYRROLATE 0.2 MG/ML
INJECTION, SOLUTION INTRAMUSCULAR; INTRAVENOUS
Status: DISPENSED
Start: 2023-01-18